# Patient Record
Sex: MALE | Race: ASIAN | NOT HISPANIC OR LATINO | Employment: UNEMPLOYED | URBAN - METROPOLITAN AREA
[De-identification: names, ages, dates, MRNs, and addresses within clinical notes are randomized per-mention and may not be internally consistent; named-entity substitution may affect disease eponyms.]

---

## 2017-05-24 ENCOUNTER — OFFICE VISIT (OUTPATIENT)
Dept: FAMILY MEDICINE | Facility: CLINIC | Age: 22
End: 2017-05-24

## 2017-05-24 VITALS
TEMPERATURE: 97.9 F | WEIGHT: 121.8 LBS | SYSTOLIC BLOOD PRESSURE: 131 MMHG | DIASTOLIC BLOOD PRESSURE: 88 MMHG | HEART RATE: 87 BPM | BODY MASS INDEX: 21.58 KG/M2

## 2017-05-24 DIAGNOSIS — F51.01 PRIMARY INSOMNIA: ICD-10-CM

## 2017-05-24 DIAGNOSIS — R63.4 LOSS OF WEIGHT: ICD-10-CM

## 2017-05-24 DIAGNOSIS — F32.3 MAJOR DEPRESSIVE DISORDER, SINGLE EPISODE, SEVERE WITH PSYCHOTIC FEATURES (H): ICD-10-CM

## 2017-05-24 DIAGNOSIS — J45.41 MODERATE PERSISTENT ASTHMA WITH ACUTE EXACERBATION: ICD-10-CM

## 2017-05-24 DIAGNOSIS — Z00.00 ROUTINE GENERAL MEDICAL EXAMINATION AT A HEALTH CARE FACILITY: Primary | ICD-10-CM

## 2017-05-24 LAB
BASOPHILS # BLD AUTO: 0.1 THOU/UL (ref 0–0.2)
BASOPHILS NFR BLD AUTO: 1 % (ref 0–2)
BUN SERPL-MCNC: 10.7 MG/DL (ref 7–21)
CALCIUM SERPL-MCNC: 10 MG/DL (ref 8.5–10.1)
CHLORIDE SERPLBLD-SCNC: 99.3 MMOL/L (ref 98–110)
CO2 SERPL-SCNC: 25.4 MMOL/L (ref 20–32)
CREAT SERPL-MCNC: 0.9 MG/DL (ref 0.7–1.3)
EOSINOPHIL # BLD AUTO: 0.5 THOU/UL (ref 0–0.4)
EOSINOPHIL NFR BLD AUTO: 7 % (ref 0–6)
ERYTHROCYTE [DISTWIDTH] IN BLOOD BY AUTOMATED COUNT: 19.9 % (ref 11–14.5)
GFR SERPL CREATININE-BSD FRML MDRD: >90 ML/MIN/1.7 M2
GLUCOSE SERPL-MCNC: 97.1 MG'DL (ref 70–99)
HCT VFR BLD AUTO: 47.4 % (ref 40–54)
HGB BLD-MCNC: 14.4 G/DL (ref 14–18)
LYMPHOCYTES # BLD AUTO: 2.6 THOU/UL (ref 0.8–4.4)
LYMPHOCYTES NFR BLD AUTO: 31 % (ref 20–40)
MCH RBC QN AUTO: 19.1 PG (ref 27–34)
MCHC RBC AUTO-ENTMCNC: 30.4 G/DL (ref 32–36)
MCV RBC AUTO: 63 FL (ref 80–100)
MONOCYTES # BLD AUTO: 0.6 THOU/UL (ref 0–0.9)
MONOCYTES NFR BLD AUTO: 7 % (ref 2–10)
NEUTROPHILS # BLD AUTO: 4.6 THOU/UL (ref 2–7.7)
NEUTROPHILS NFR BLD AUTO: 55 % (ref 50–70)
PLATELET # BLD AUTO: 287 THOU/UL (ref 140–440)
PMV BLD AUTO: 11.3 FL (ref 8.5–12.5)
POTASSIUM SERPL-SCNC: 3.4 MMOL/DL (ref 3.2–4.6)
RBC # BLD AUTO: 7.55 MILL/UL (ref 4.4–6.2)
SODIUM SERPL-SCNC: 138.2 MMOL/L (ref 132–142)
TSH SERPL DL<=0.05 MIU/L-ACNC: 1.53 UIU/ML (ref 0.3–5)
WBC # BLD AUTO: 8.4 THOU/UL (ref 4–11)

## 2017-05-24 RX ORDER — HYDROXYZINE HYDROCHLORIDE 25 MG/1
25-50 TABLET, FILM COATED ORAL EVERY 6 HOURS PRN
Qty: 60 TABLET | Refills: 1 | Status: SHIPPED | OUTPATIENT
Start: 2017-05-24 | End: 2018-11-14

## 2017-05-24 RX ORDER — FLUTICASONE PROPIONATE 110 UG/1
2 AEROSOL, METERED RESPIRATORY (INHALATION) 2 TIMES DAILY
Qty: 1 INHALER | Refills: 1 | Status: SHIPPED | OUTPATIENT
Start: 2017-05-24 | End: 2017-05-24

## 2017-05-24 RX ORDER — FLUTICASONE PROPIONATE 110 UG/1
2 AEROSOL, METERED RESPIRATORY (INHALATION) 2 TIMES DAILY
Qty: 1 INHALER | Refills: 1 | Status: SHIPPED | OUTPATIENT
Start: 2017-05-24 | End: 2018-11-14

## 2017-05-24 ASSESSMENT — ANXIETY QUESTIONNAIRES
3. WORRYING TOO MUCH ABOUT DIFFERENT THINGS: SEVERAL DAYS
6. BECOMING EASILY ANNOYED OR IRRITABLE: NOT AT ALL
5. BEING SO RESTLESS THAT IT IS HARD TO SIT STILL: NOT AT ALL
2. NOT BEING ABLE TO STOP OR CONTROL WORRYING: MORE THAN HALF THE DAYS
1. FEELING NERVOUS, ANXIOUS, OR ON EDGE: SEVERAL DAYS
7. FEELING AFRAID AS IF SOMETHING AWFUL MIGHT HAPPEN: SEVERAL DAYS
GAD7 TOTAL SCORE: 7
IF YOU CHECKED OFF ANY PROBLEMS ON THIS QUESTIONNAIRE, HOW DIFFICULT HAVE THESE PROBLEMS MADE IT FOR YOU TO DO YOUR WORK, TAKE CARE OF THINGS AT HOME, OR GET ALONG WITH OTHER PEOPLE: VERY DIFFICULT

## 2017-05-24 ASSESSMENT — PATIENT HEALTH QUESTIONNAIRE - PHQ9: 5. POOR APPETITE OR OVEREATING: MORE THAN HALF THE DAYS

## 2017-05-24 NOTE — MR AVS SNAPSHOT
After Visit Summary   5/24/2017    Mira Segundo    MRN: [id number]           Patient Information     Date Of Birth          1995        Visit Information        Provider Department      5/24/2017 11:00 AM Kerri Ventura MD [facility]        Today's Diagnoses     Routine general medical examination at a health care facility    -  1    Moderate persistent asthma with acute exacerbation        Primary insomnia        Loss of weight        Major depressive disorder, single episode, severe with psychotic features (H)          Care Instructions      Preventive Health Recommendations  Male Ages 18 - 25     Yearly exam:             See your health care provider every year in order to  o   Review health changes.   o   Discuss preventive care.    o   Review your medicines if your doctor has prescribed any.    You should be tested each year for STDs (sexually transmitted diseases).     Talk to your provider about cholesterol testing.      If you are at risk for diabetes, you should have a diabetes test (fasting glucose).    Shots: Get a flu shot each year. Get a tetanus shot every 10 years.     Nutrition:    Eat at least 5 servings of fruits and vegetables daily.     Eat whole-grain bread, whole-wheat pasta and brown rice instead of white grains and rice.     Talk to your provider about calcium and Vitamin D.     Lifestyle    Exercise for at least 150 minutes a week (30 minutes a day, 5 days a week). This will help you control your weight and prevent disease.     Limit alcohol to one drink per day.     No smoking.     Wear sunscreen to prevent skin cancer.     See your dentist every six months for an exam and cleaning.             Follow-ups after your visit        Future tests that were ordered for you today     Open Future Orders        Priority Expected Expires Ordered    H. Pylori Agn Fecal ([facility]) Routine  6/1/2017 5/25/2017    Ova And Parasite - Stool ([facility]) Routine  6/1/2017 5/25/2017             Who to contact     Please call your clinic at [phone] to:    Ask questions about your health    Make or cancel appointments    Discuss your medicines    Learn about your test results    Speak to your doctor   If you have compliments or concerns about an experience at your clinic, or if you wish to file a complaint, please contact [facility] Patient Relations at [phone] or email us at [email or web address]         Additional Information About Your Visit        [employer] Information     Oppex is an electronic gateway that provides easy, online access to your medical records. With Oppex, you can request a clinic appointment, read your test results, renew a prescription or communicate with your care team.     To sign up for Oppex visit the website at [email or web address]   You will be asked to enter the access code listed below, as well as some personal information. Please follow the directions to create your username and password.     Your access code is: [email or web address]  Expires: 2017  3:09 PM     Your access code will  in 90 days. If you need help or a new code, please contact your [facility] or call [phone] for assistance.        Care EveryWhere ID     This is your Care EveryWhere ID. This could be used by other organizations to access your [facility] medical records  [id number]        Your Vitals Were     Pulse Temperature BMI (Body Mass Index)             87 97.9  F (36.6  C) (Oral) 21.58 kg/m2          Blood Pressure from Last 3 Encounters:   17 120/84   17 131/88   16 (!) 133/92    Weight from Last 3 Encounters:   17 118 lb 9.6 oz (53.8 kg)   17 121 lb 12.8 oz (55.2 kg)   16 145 lb 9.6 oz (66 kg)              We Performed the Following     Basic Metabolic Panel ([facility])  - Results < 1 hr     CBC w/ Diff and Plt ([facility])     TSH  Sensitive ([facility])           Today's Medication Changes          These changes are accurate as of: 5/24/17 11:59 PM.  If you have any questions, ask your nurse or doctor.               Start taking these medicines.        Dose/Directions    beclomethasone 40 MCG/ACT Inhaler   Commonly known as:  QVAR   Used for:  Moderate persistent asthma with acute exacerbation   Started by:  Kerri Ventura MD        Dose:  2 puff   Inhale 2 puffs into the lungs 2 times daily   Quantity:  1 Inhaler   Refills:  1       fluticasone 110 MCG/ACT Inhaler   Commonly known as:  FLOVENT HFA   Used for:  Moderate persistent asthma with acute exacerbation   Started by:  Kerri Ventura MD        Dose:  2 puff   Inhale 2 puffs into the lungs 2 times daily   Quantity:  1 Inhaler   Refills:  1       hydrOXYzine 25 MG tablet   Commonly known as:  ATARAX   Used for:  Primary insomnia   Started by:  Kerri Ventura MD        Dose:  25-50 mg   Take 1-2 tablets (25-50 mg) by mouth every 6 hours as needed for itching   Quantity:  60 tablet   Refills:  1            Where to get your medicines      [facility] [place] [place] - [address], [place] [address]     Phone:  [phone]     beclomethasone 40 MCG/ACT Inhaler    fluticasone 110 MCG/ACT Inhaler    hydrOXYzine 25 MG tablet                Primary Care Provider Office Phone # Fax #    Kerri Ventura [phone]       [address] [address]        Thank you!     Thank you for choosing [facility]  for your care. Our goal is always to provide you with excellent care. Hearing back from our patients is one way we can continue to improve our services. Please take a few minutes to complete the written survey that you may receive in the mail after your visit with us. Thank you!             Your Updated Medication List - Protect others around you: Learn how to safely use, store and throw away your  medicines at www.disposemymeds.org.          This list is accurate as of: 5/24/17 11:59 PM.  Always use your most recent med list.                   Brand Name Dispense Instructions for use    albuterol 108 (90 BASE) MCG/ACT Inhaler    PROAIR HFA/PROVENTIL HFA/VENTOLIN HFA    1 Inhaler    Inhale 2 puffs into the lungs every 6 hours as needed for shortness of breath / dyspnea       beclomethasone 40 MCG/ACT Inhaler    QVAR    1 Inhaler    Inhale 2 puffs into the lungs 2 times daily       fluticasone 110 MCG/ACT Inhaler    FLOVENT HFA    1 Inhaler    Inhale 2 puffs into the lungs 2 times daily       hydrOXYzine 25 MG tablet    ATARAX    60 tablet    Take 1-2 tablets (25-50 mg) by mouth every 6 hours as needed for itching       ibuprofen 600 MG tablet    IBU    60 tablet    Take 1 tablet (600 mg) by mouth every 6 hours as needed for pain       nicotine 10 MG Inhaler    NICOTROL    180 Box    Puff on as needed for smoking cravings

## 2017-05-24 NOTE — PROGRESS NOTES
Preceptor attestation:  Patient seen and discussed with the resident. Assessment and plan reviewed with resident and agreed upon.  Supervising physician: Radha Faria  [facility]

## 2017-05-24 NOTE — PROGRESS NOTES
Male Physical Note    A Norma  was present for exam today. Patient speaks English and did not use  for most of the visit.     Concerns today:   - Asthma: Reports that his asthma is not well controlled. Uses albuterol as needed. Reports that the last time he had an asthma attack was 2 months ago. He did not have to go to the hospital or clinic. He used albuterol 4-5 times that day and he felt better. Other than that, he uses albuterol before playing soccer and the last time he used it was yesterday.     - Weight Loss: Reports that since the last time he was in clinic, he lost at least 20 lbs. He has not does this intentionally. He reports that he has not been eating or sleeping well since he stopped some of his medications. He is unsure if he is hungry. He doesn't notice changes in stool patterns. Occasional vomiting, but not in the past 2 months.     - Insomnia: Sleeps for 2-3 hours every 2-3 days. Previously felt that Olanzapine helped him sleep but he stopped taking it due to side effects and wants to have medication for sleep. He reports that he does feel tired, but is just not able to fall or stay asleep.    - Depression: Patient has a history of major depression with severe psychotic features, anxiety, and PTSD. He was seen at [facility] in the past. He does not recall when the last time he was there was. He remembers that he was late for his appointment and so it was cancelled and then he did not go back to reschedule the appointment. He cannot remember if this was weeks, months, or years prior to today. Denies SI/HI and is able to contract for safety. Feels safe at home.         ROS:                      CONSTITUTIONAL: weight loss, fatigue, difficulty sleeping  SKIN: no worrisome rashes, no worrisome moles, no worrisome lesions  EYES: no acute vision problems or changes  ENT: no ear problems, no mouth problems, no throat problems  RESP: no significant cough, no shortness of breath  CV: no  chest pain, no palpitations, no new or worsening peripheral edema  GI: no nausea, no vomiting, no constipation, no diarrhea  : no frequency, no dysuria, no hematuria  MS: no claudication, no myalgias, no joint aches  NEURO: no weakness, no dizziness, no syncope, no headaches  ENDOCRINE: no temperature intolerance, no skin/hair changes  PSYCHIATRIC: Increasing anxiety, depressed mood, appetite changes and weight change    Past Medical History:   Diagnosis Date     Moderate intermittent asthma      Severe recurrent major depression w/psychotic features, mood-congruent (H)         No family history on file.  Reviewed no other significant FH           Family History and past Medical History reviewed and unchanged/updated.    Social History   Substance Use Topics     Smoking status: Current Every Day Smoker     Last attempt to quit: 4/30/2012     Smokeless tobacco: Never Used      Comment: stopped smoking in 4/30/2013 per pt info.     Alcohol use Not on file   - Smokes 3 cigarettes per day. Has tried to quit but has not been able to stop. Uncertain if he wants to quit at this time.  - Does not drink daily. Previously reports he drank daily and would drink multiple beers per day but then felt that it made him more thirsty and so he stopped drinking. He denies problems with work or family due to prior drinking  - Lives at home with his parents and 4 siblings, he is the 2nd oldest.   - Works daily as a PCA    Single  Children ? no    Has anyone hurt you physically, for example by pushing, hitting, slapping or kicking you or forcing you to have sex? Denies  Do you feel threatened or controlled by a partner, ex-partner or anyone in your life? Denies    RISK BEHAVIORS AND HEALTHY HABITS:  Tobacco Use/Smoking: 3 cigarettes per day  Illicit Drug Use: None  ETOH: None  Do you use alcohol? No  Sexually Active: No  Diet (5-7 servings of fruits/veg daily): No   Exercise (30 min accumulated most days):No  Dental Care: No   Calcium  1500 mg/d:  No  Seat Belt Use: Yes       Immunization History   Administered Date(s) Administered     Hepatitis A Vac Ped/Adol-2 Dose 03/27/2009, 11/11/2009     Hepatitis B 11/25/2008, 03/27/2009, 06/23/2009     Influenza (IIV3) 09/09/2013     MMR 11/25/2008, 03/27/2009     Meningococcal (Menactra ) 09/09/2013     Meningococcal (Menomune ) 03/27/2009     Poliovirus, inactivated (IPV) 03/27/2009, 05/01/2009, 06/23/2009     TD (ADULT, 7+) 11/25/2008, 06/23/2009     Tdap (Adacel,Boostrix) 03/27/2009     Varicella 03/27/2009, 09/09/2013     EXAMINATION:  /88 (BP Location: Left arm, Patient Position: Chair, Cuff Size: Adult Regular)  Pulse 87  Temp 97.9  F (36.6  C) (Oral)  Wt 121 lb 12.8 oz (55.2 kg)  BMI 21.58 kg/m2  GENERAL: healthy, alert and no distress  EYES: Eyes grossly normal to inspection, extraocular movements - intact, and PERRL  HENT: ear canals- normal; TMs- normal; Nose- normal; Mouth- no ulcers, no lesions  NECK: no tenderness, no adenopathy, no asymmetry, no masses, no stiffness; thyroid- normal to palpation  RESP: lungs clear to auscultation - no rales, no rhonchi, no wheezes  CV: regular rates and rhythm, normal S1 S2, no S3 or S4 and no murmur, no click or rub -  ABDOMEN: soft, no tenderness, no  hepatosplenomegaly, no masses, normal bowel sounds  MS: extremities- no gross deformities noted, no edema  SKIN: no suspicious lesions, no rashes  NEURO: strength and tone- normal, sensory exam- grossly normal, mentation- intact, speech- normal, reflexes- symmetric  BACK: no CVA tenderness, no paralumbar tenderness  - Patient Declined  RECTAL- Patient Declined  PSYCH: Alert and oriented times 3; speech- coherent , normal rate and volume, no tangential thoughts, no hallucinations or delusions, affect- flat, Difficult to assess thought process as patient occasionally uses  but at times does not appear to understand question but does not have  explain question or answer. Answers  some questions with inappropriate response but unclear if this is from thought process or from miscommunication.   LYMPHATICS: ant. cervical- normal, post. cervical- normal, axillary- normal, supraclavicular- normal, inguinal- normal    ASSESSMENT and PLAN:  1. Moderate persistent asthma with acute exacerbation: Patient reports poor control, but does appear to have only had one exacerbation in the past 6 months which did not require steroids and was not evaluated by a health care provider. Attempted to refill flovent and was informed by pharmacy that was not covered by current insurance and so QVAR was prescribed. Would like patient to come back for PFT testing as this has not previously been done and may help to assess severity of disease.  - fluticasone (FLOVENT HFA) 110 MCG/ACT Inhaler; Inhale 2 puffs into the lungs 2 times daily  Dispense: 1 Inhaler; Refill: 1  - beclomethasone (QVAR) 40 MCG/ACT Inhaler; Inhale 2 puffs into the lungs 2 times daily  Dispense: 1 Inhaler; Refill: 1    2. Routine general medical examination at a health care facility  - Up to date with vaccinations and screenings    3. Primary insomnia: Previously on Olanzapine and Prozac. Not currently taking. Will give hydroxyzine for now, but want patient to come back within the next week to discuss depression and insomnia with weight loss.  - hydrOXYzine (ATARAX) 25 MG tablet; Take 1-2 tablets (25-50 mg) by mouth every 6 hours as needed for itching  Dispense: 60 tablet; Refill: 1    4. Loss of weight: Nearly 30 lbs lost since last visit earlier this year and reports not eating as much as previously and sleeping very poorly. Will check basic labs and have patient return to discuss results.   - TSH  Sensitive ([facility])  - Basic Metabolic Panel ([facility])  - Results < 1 hr  - CBC w/ Diff and Plt ([facility])    5. Major depressive disorder, single episode, severe with psychotic features (H): PHQ-9 was 12 today. Patient denies SI/HI and feels that  his depression is worse than when he was on medications. Not sure when he was last seen for therapy or evaluation by psychiatrist. Will have patient return to discuss and plan for treatment at follow-up visit.    Kerri Ventura MD - PGY-3  [facility] Res[facility]    Patient seen and plan of care discussed with preceptor, Dr. Faria

## 2017-05-24 NOTE — NURSING NOTE
name: Pamela Shipley  Language: Norma  Agency: [facility]  Phone number: [phone]

## 2017-05-25 ENCOUNTER — OFFICE VISIT (OUTPATIENT)
Dept: FAMILY MEDICINE | Facility: CLINIC | Age: 22
End: 2017-05-25

## 2017-05-25 VITALS
BODY MASS INDEX: 21.02 KG/M2 | WEIGHT: 118.6 LBS | HEIGHT: 63 IN | HEART RATE: 67 BPM | OXYGEN SATURATION: 100 % | SYSTOLIC BLOOD PRESSURE: 120 MMHG | DIASTOLIC BLOOD PRESSURE: 84 MMHG | TEMPERATURE: 98.1 F

## 2017-05-25 DIAGNOSIS — F43.23 ADJUSTMENT DISORDER WITH MIXED ANXIETY AND DEPRESSED MOOD: ICD-10-CM

## 2017-05-25 DIAGNOSIS — R10.13 ABDOMINAL PAIN, EPIGASTRIC: Primary | ICD-10-CM

## 2017-05-25 DIAGNOSIS — R63.4 LOSS OF WEIGHT: ICD-10-CM

## 2017-05-25 RX ORDER — MIRTAZAPINE 15 MG/1
15 TABLET, FILM COATED ORAL AT BEDTIME
Qty: 90 TABLET | Refills: 3 | Status: SHIPPED | OUTPATIENT
Start: 2017-05-25 | End: 2018-11-14

## 2017-05-25 ASSESSMENT — PATIENT HEALTH QUESTIONNAIRE - PHQ9: SUM OF ALL RESPONSES TO PHQ QUESTIONS 1-9: 12

## 2017-05-25 ASSESSMENT — ANXIETY QUESTIONNAIRES: GAD7 TOTAL SCORE: 7

## 2017-05-25 NOTE — PROGRESS NOTES
Preceptor attestation:  Patient seen and discussed with the resident. Assessment and plan reviewed with resident and agreed upon.  Supervising physician: Lamont Kearney  [facility]

## 2017-05-25 NOTE — LETTER
June 6, 2017      Mira Segundo  [address]  [place] [address]        Dear Mira Segundo,     Your healthcare is very important to us. In reviewing your chart, we found that you have not had your referral appointment with Lab. Please call our scheduling department to schedule this appointment. If you have any questions, please call [facility] at [phone]. Thank you.     Sincerely,      Jose Rafael Christianson     [facility]  Hours:  Monday through Friday, 8:00 am - 5:00 pm  Phone Number: [phone]

## 2017-05-25 NOTE — NURSING NOTE
name: Pamela Shipley  Language: Norma  Agency: [facility]  Phone number: [phone]

## 2017-05-25 NOTE — MR AVS SNAPSHOT
After Visit Summary   5/25/2017    Mira Segundo    MRN: [id number]           Patient Information     Date Of Birth          1995        Visit Information        Provider Department      5/25/2017 1:50 PM Kerri Ventura MD [facility]        Today's Diagnoses     Abdominal pain, epigastric    -  1    Loss of weight        Adjustment disorder with mixed anxiety and depressed mood          Care Instructions    Start taking Mirtazepine 15 mg every night    Make an appointment to follow-up in 2 weeks for either Tuesday morning, Thursday afternoon or Friday afternoon with Dr. Ventura to help with mental health referral            Follow-ups after your visit        Future tests that were ordered for you today     Open Future Orders        Priority Expected Expires Ordered    H. Pylori Agn Fecal ([facility]) Routine  6/1/2017 5/25/2017    Ova And Parasite - Stool ([facility]) Routine  6/1/2017 5/25/2017            Who to contact     Please call your clinic at [phone] to:    Ask questions about your health    Make or cancel appointments    Discuss your medicines    Learn about your test results    Speak to your doctor   If you have compliments or concerns about an experience at your clinic, or if you wish to file a complaint, please contact [facility] Patient Relations at [phone] or email us at [email or web address]         Additional Information About Your Visit        [employer] Information     Exabeam is an electronic gateway that provides easy, online access to your medical records. With Exabeam, you can request a clinic appointment, read your test results, renew a prescription or communicate with your care team.     To sign up for EcoStartt visit the website at [email or web address]   You will be asked to enter the access code listed below, as well as some personal information. Please follow the directions to create your username and password.     Your  "access code is: [email or web address]  Expires: 2017  3:09 PM     Your access code will  in 90 days. If you need help or a new code, please contact your [facility] or call [phone] for assistance.        Care EveryWhere ID     This is your Care EveryWhere ID. This could be used by other organizations to access your [facility] medical records  [id number]        Your Vitals Were     Pulse Temperature Height Pulse Oximetry BMI (Body Mass Index)       67 98.1  F (36.7  C) (Oral) 5' 3\" (160 cm) 100% 21.01 kg/m2        Blood Pressure from Last 3 Encounters:   17 120/84   17 131/88   16 (!) 133/92    Weight from Last 3 Encounters:   17 118 lb 9.6 oz (53.8 kg)   17 121 lb 12.8 oz (55.2 kg)   16 145 lb 9.6 oz (66 kg)                 Today's Medication Changes          These changes are accurate as of: 17  3:09 PM.  If you have any questions, ask your nurse or doctor.               Start taking these medicines.        Dose/Directions    mirtazapine 15 MG tablet   Commonly known as:  REMERON   Used for:  Adjustment disorder with mixed anxiety and depressed mood   Started by:  Kerri Ventura MD        Dose:  15 mg   Take 1 tablet (15 mg) by mouth At Bedtime   Quantity:  90 tablet   Refills:  3            Where to get your medicines      These medications were sent to [facility] - [place], [place] - [address], [place] [address]     Phone:  [phone]     mirtazapine 15 MG tablet                Primary Care Provider Office Phone # Fax #    Kerri Ventura [phone]       [address] [address]        Thank you!     Thank you for choosing [facility]  for your care. Our goal is always to provide you with excellent care. Hearing back from our patients is one way we can continue to improve our services. Please take a few minutes to complete the written survey " that you may receive in the mail after your visit with us. Thank you!             Your Updated Medication List - Protect others around you: Learn how to safely use, store and throw away your medicines at www.disposemymeds.org.          This list is accurate as of: 5/25/17  3:09 PM.  Always use your most recent med list.                   Brand Name Dispense Instructions for use    albuterol 108 (90 BASE) MCG/ACT Inhaler    PROAIR HFA/PROVENTIL HFA/VENTOLIN HFA    1 Inhaler    Inhale 2 puffs into the lungs every 6 hours as needed for shortness of breath / dyspnea       beclomethasone 40 MCG/ACT Inhaler    QVAR    1 Inhaler    Inhale 2 puffs into the lungs 2 times daily       fluticasone 110 MCG/ACT Inhaler    FLOVENT HFA    1 Inhaler    Inhale 2 puffs into the lungs 2 times daily       hydrOXYzine 25 MG tablet    ATARAX    60 tablet    Take 1-2 tablets (25-50 mg) by mouth every 6 hours as needed for itching       ibuprofen 600 MG tablet    IBU    60 tablet    Take 1 tablet (600 mg) by mouth every 6 hours as needed for pain       mirtazapine 15 MG tablet    REMERON    90 tablet    Take 1 tablet (15 mg) by mouth At Bedtime       nicotine 10 MG Inhaler    NICOTROL    180 Box    Puff on as needed for smoking cravings

## 2017-05-25 NOTE — PROGRESS NOTES
SUBJECTIVE       Mira Segundo is a 22 year old  male with a PMH significant for:     Patient Active Problem List   Diagnosis     Adjustment disorder with mixed anxiety and depressed mood     Chronic motor or vocal tic disorder     Dyshidrosis     Mild intermittent asthma     Beta-Thalassemia Trait     Major depressive disorder, single episode, severe with psychotic features (H)     Patient is here for follow-up of weight loss and insomnia. He reports he usually only eats 1 time per day. He is eating rice and vegetables 1 time per day but mostly just 1 time per day. Reports that he eats about 1 cup of of rice/vegetables at most. His stomach hurts when he does not eat. Eating makes the pain better. He does not eat because he is worried he might vomit. Last time he vomiting was 2 months ago.  Feels that he is nauseated sometimes feels nauseated which is maybe 1-2 times per week. Has a bowel movement every other day or every 3 days. No diarrhea. Not painful and no blood in his stool. No changes in urine color and no hematuria that he can appreciate. Has epigastric pain and sometimes all over abdominal pain. Denies bad taste in his mouth. Denies burning sensation in his chest. Feels that food does not have a good taste.     Has only been sleeping 2 hours every 2 days. Reports that problems started in December and January. He stopped taking his medications in December. He stopped taking them because he felt he was having dizziness, headache, and low back pain because of them. Since January he has initially slept every 3 days. Now he only sleeps every 2 days which is better. When his is unable to sleep, he watches movies and listens to music. Feels tired. Does not feel that he is being more productive. Denies auditory or visual hallucinations. Has never been on any other medications for depression. Denies SI/HI. Feels more anxious and worried. Wants to go to college but feels that he has a bad memory. He is very concerned  "about taking any of the medications he was previously on because he is concerned about the side effects.     Reviewed labs from yesterday with patient which were unremarkable except for mild absolute eosinophilia.     Patient attempted to complete PFT's today for evaluation of his asthma but was not able to follow instructions for breathing technique after multiple attempts.     PMH, Medications and Allergies were reviewed and updated as needed.        OBJECTIVE     Vitals:    05/25/17 1409   BP: 120/84   Pulse: 67   Temp: 98.1  F (36.7  C)   TempSrc: Oral   SpO2: 100%   Weight: 118 lb 9.6 oz (53.8 kg)   Height: 5' 3\" (160 cm)     Body mass index is 21.01 kg/(m^2).    G: Calm appearing male in no apparent distress  HEENT: normocephalic, atraumatic, PERRL, sclera anicteric and not injected. TM flat without erythema. No cervical adenopathy  Cardio: RRR, no murmurs, rubs or gallop  Respiratory: Clear breath sounds bilaterally, no wheezes or rhonchi  Abdomen: Soft, non-tender, no masses or organomegaly  Extremities: No edema, no rashes  Psych: Denies SI/HI    Results for orders placed or performed in visit on 05/24/17   TSH  Sensitive ([employer])   Result Value Ref Range    TSH 1.53 0.30 - 5.00 uIU/mL    Narrative    Test performed by:  [facility] [address] [place] [address]   Basic Metabolic Panel ([facility])  - Results < 1 hr   Result Value Ref Range    Urea Nitrogen 10.7 7.0 - 21.0 mg/dL    Calcium 10.0 8.5 - 10.1 mg/dL    Chloride 99.3 98.0 - 110.0 mmol/L    Carbon Dioxide 25.4 20.0 - 32.0 mmol/L    Creatinine 0.9 0.7 - 1.3 mg/dL    Glucose 97.1 70.0 - 99.0 mg'dL    Potassium 3.4 3.2 - 4.6 mmol/dL    Sodium 138.2 132.0 - 142.0 mmol/L    GFR Estimate >90 >60.0 mL/min/1.7 m2    GFR Estimate If Black >90 >60.0 mL/min/1.7 m2   CBC w/ Diff and Plt ([employer])   Result Value Ref Range    WBC 8.4 4.0 - 11.0 thou/uL    RBC 7.55 (H) 4.40 - 6.20 mill/uL    Hemoglobin 14.4 14.0 - 18.0 g/dL    Hematocrit " 47.4 40.0 - 54.0 %    MCV 63 (L) 80 - 100 fL    MCH 19.1 (L) 27.0 - 34.0 pg    MCHC 30.4 (L) 32.0 - 36.0 g/dL    RDW 19.9 (H) 11.0 - 14.5 %    Platelets 287 140 - 440 thou/uL    Mean Platelet Volume 11.3 8.5 - 12.5 fL    % Neutrophils 55 50 - 70 %    % Lymphocytes 31 20 - 40 %    % Monocytes 7 2 - 10 %    % Eosinophils 7 (H) 0 - 6 %    % Basophils 1 0 - 2 %    Neutrophils (Absolute) 4.6 2.0 - 7.7 thou/uL    Lymphs (Absolute) 2.6 0.8 - 4.4 thou/uL    Monocytes(Absolute) 0.6 0.0 - 0.9 thou/uL    Eos (Absolute) 0.5 (H) 0.0 - 0.4 thou/uL    Baso (Absolute) 0.1 0.0 - 0.2 thou/uL    Narrative    Test performed by:  [facility] [address] [place] [address]       ASSESSMENT AND PLAN     1. Abdominal pain, epigastric: Patient uncertain about his abdominal pain. Does not endorse specific symptoms of esophageal reflux. Will test for H. Pylori given his intermittent abdominal pain and weight loss. Will not start PPI or H2 blocker unless positive or patient becomes symptomatic.   - H. Pylori Agn Fecal ([employer]); Future    2. Loss of weight: Intermittent abdominal pain. No changes in stool. No recent travel. Will get stool O&P due to weight loss and mild eosinophilia.   - Ova And Parasite - Stool ([employer]); Future    3. Adjustment disorder with mixed anxiety and depressed mood: Patient has a longstanding history of severe depression with psychosis and PTSD. Sleep disturbance and decreased appetite with weight loss have started since patient stopped all of his medications due to side effects. He declines to restart Zyprexa or Prozac due to side effects. Patient signed SHANTAL today and we were assisted by Dr. Lombardi to complete them from previously psychotherapy and psychiatry. Recommend that patient be referred for therapy again and he is agreeable to starting this. Will start Mirtazepine for depression with decreased appetite and weight loss. Would like patient to follow-up in 2 weeks to recheck weight  and symptoms. Requested to schedule on day when in house mental health provider is available to meet patient and help with referral.  - mirtazapine (REMERON) 15 MG tablet; Take 1 tablet (15 mg) by mouth At Bedtime  Dispense: 90 tablet; Refill: 3  - Mental Health Adult Referral-Kale Ventura MD - PGY-3  [facility]    Patient seen and plan of care discussed with preceptor, Dr. Kearney

## 2017-05-25 NOTE — PATIENT INSTRUCTIONS
Start taking Mirtazepine 15 mg every night    Make an appointment to follow-up in 2 weeks for either Tuesday morning, Thursday afternoon or Friday afternoon with Dr. Ventura to help with mental health referral      05/30/17  A message has been sent to the  Team to advise for treatment options.  Sofi LEE have mailed a letter home to the patient with these resources to schedule.  Sofi  05/31/17    Referral Tracking:  Referral complete. Patient seeking services at [facility].  Kat Correa  8/2/17

## 2017-05-30 ENCOUNTER — DOCUMENTATION ONLY (OUTPATIENT)
Dept: PSYCHOLOGY | Facility: CLINIC | Age: 22
End: 2017-05-30

## 2017-05-30 NOTE — PROGRESS NOTES
Neil Farah,    Review of the patient's medical record and mental health referral order indicates this referral is for both psychiatry (long-term medication management) and psychotherapy services. Please reach out to the patient in order to provide him with the following community resources.    Nathaniel Lombardi, Ph.D.  Behavioral Health Fellow        [facility]  [facility] [address] [facility] [address]  [place] [address]  [phone]    [facility]  [address] [place] [address] [address]  [place], [place] [address]  [phone]    [facility] [address]   [facility] [address]  [phone]    [facility]  [address] [place] [address] [address]   [place], [place] [address]  [phone]

## 2017-05-30 NOTE — LETTER
May 31, 2017      Mira Segundo  [address]  [place] [address]        Dear Mira,    We recently had seen you in clinic and a referral was placed for you to see a mental health provider. After review of the referral we feel one of the following locations would be best for your treatment.     [facility]  [address]  [address]  [place] [address]  [phone]     [facility]  [address]  [address]  [place], [place] [address]  [phone]     [facility] [place] [facility]  [facility]  [address]  [address]   [place] [address]  [phone]     [facility]  [address]   [place], [place] [address]  [phone]    Contact one of these clinics and go through their intake process to schedule an appointment. Feel free to call us with questions. Thank you.       Sincerely,  Sofi  Referral Coordinator

## 2017-05-30 NOTE — PROGRESS NOTES
Behavioral Health Team,    Patient is being referred for mental health services. Please advise if we are able to see patient for in house treatment or if a community option would be best.    Thank you.     Sofi  Referral Coordinator

## 2017-05-31 NOTE — PROGRESS NOTES
Thank you.  I have mailed a letter home to the patient with these resources to schedule.  Sofi  05/31/17

## 2017-06-21 ENCOUNTER — OFFICE VISIT (OUTPATIENT)
Dept: FAMILY MEDICINE | Facility: CLINIC | Age: 22
End: 2017-06-21

## 2017-06-21 VITALS
DIASTOLIC BLOOD PRESSURE: 84 MMHG | BODY MASS INDEX: 21.65 KG/M2 | SYSTOLIC BLOOD PRESSURE: 125 MMHG | HEART RATE: 60 BPM | HEIGHT: 63 IN | WEIGHT: 122.2 LBS | TEMPERATURE: 97.9 F

## 2017-06-21 DIAGNOSIS — M79.5 FOREIGN BODY (FB) IN SOFT TISSUE: ICD-10-CM

## 2017-06-21 DIAGNOSIS — R63.4 LOSS OF WEIGHT: Primary | ICD-10-CM

## 2017-06-21 DIAGNOSIS — F43.23 ADJUSTMENT DISORDER WITH MIXED ANXIETY AND DEPRESSED MOOD: ICD-10-CM

## 2017-06-21 RX ORDER — MUPIROCIN 20 MG/G
OINTMENT TOPICAL 3 TIMES DAILY
Qty: 22 G | Refills: 1 | Status: SHIPPED | OUTPATIENT
Start: 2017-06-21 | End: 2017-06-26

## 2017-06-21 NOTE — PROGRESS NOTES
"       SUBJECTIVE       Lo Sanya is a 22 year old  male with a PMH significant for:     Patient Active Problem List   Diagnosis     Adjustment disorder with mixed anxiety and depressed mood     Chronic motor or vocal tic disorder     Dyshidrosis     Mild intermittent asthma     Beta-Thalassemia Trait     Major depressive disorder, single episode, severe with psychotic features (H)     He presents with fishhook in Right earlobe.  Happened last evening.  Couldn't get out at home.  Single hook.      Tetnus up to date.    Seen recently and started remron.  Reports he is tolerating well, things are improved.  Weight is up 4 pounds.    PMH, Medications and Allergies were reviewed and updated as needed.        REVIEW OF SYSTEMS     General: No fevers, chills, sweats, unexplained weight loss  Head: No headache  Neck: No swallowing problems   CV: No chest pain or palpitations  Resp: No shortness of breath.  No cough. No hemoptysis.  GI: No constipation, diarrhea, or blood in stool.  no nausea or vomiting  : No pain passing urine or urinary frequency            OBJECTIVE     Vitals:    06/21/17 1018   BP: 125/84   Pulse: 60   Temp: 97.9  F (36.6  C)   TempSrc: Oral   Weight: 122 lb 3.2 oz (55.4 kg)   Height: 5' 2.5\" (158.8 cm)     Body mass index is 21.99 kg/(m^2).    Gen:  Well nourished and in NAD  Ear: hook imbeded in posterior soft tissue.  No erythema or signs of infection  HEENT: PERRLA; TMs normal color and landmarks; nasopharynx pink and moist; oropharynx pink and moist  Neck: supple without lymphadenopathy  CV:  RRR  - no murmurs, rubs, or gallups,   Pulm:  CTAB, no wheezes/rales/rhonchi, good air entry   ABD: soft, nontender, no masses, no rebound, BS intact throughout  Extrem: no cyanosis, edema or clubbing  Psych: Euthymic     No results found for this or any previous visit (from the past 24 hour(s)).        ASSESSMENT AND PLAN     Mira was seen today for ear problem.    Diagnoses and all orders for this " visit:    Brainards injury to finger, unspecified laterality, initial encounter  -     mupirocin (BACTROBAN) 2 % ointment; Apply topically 3 times daily for 5 days    After explaining procedure, with verbal consent, 1 ml 1% Lidocaine infiltrated in region of hook.  Beta dyne cleanse. Hook is grasped with hemostat and pushed through and removed.  Tolerated well.  Antibacterial ointment applied    Patient Instructions   Today, we removed a fish hook from your right ear. We sent a prescription for topical ointment to your pharmacy. Apply ointment to wound 2 times per day for one week. Please return to be seen if the wound begins having drainage or is very swollen, red and hot to touch.          Total of 30 minutes was spent in face to face contact with patient with > 50% in counseling and coordination of care.  Options for treatment and/or follow-up care were reviewed with the patient. Mira Segundo was engaged and actively involved in the decision making process. He verbalized understanding of the options discussed and was satisfied with the final plan.      Lamont Kearney MD

## 2017-06-21 NOTE — MR AVS SNAPSHOT
After Visit Summary   2017    Mira Segundo    MRN: [id number]           Patient Information     Date Of Birth          1995        Visit Information        Provider Department      2017 10:00 AM Lamont Kearney MD [facility]        Care Instructions    Today, we removed a fish hook from your right ear. We sent a prescription for topical ointment to your pharmacy. Apply ointment to wound 2 times per day for one week. Please return to be seen if the wound begins having drainage or is very swollen, red and hot to touch.              Follow-ups after your visit        Who to contact     Please call your clinic at [phone] to:    Ask questions about your health    Make or cancel appointments    Discuss your medicines    Learn about your test results    Speak to your doctor   If you have compliments or concerns about an experience at your clinic, or if you wish to file a complaint, please contact [facility] Patient Relations at [phone] or email us at [email or web address]         Additional Information About Your Visit        MyChart Information     [facility] is an electronic gateway that provides easy, online access to your medical records. With [facility], you can request a clinic appointment, read your test results, renew a prescription or communicate with your care team.     To sign up for [facility] visit the website at [email or web address]   You will be asked to enter the access code listed below, as well as some personal information. Please follow the directions to create your username and password.     Your access code is: [email or web address]  Expires: 2017  3:09 PM     Your access code will  in 90 days. If you need help or a new code, please contact your [facility] or call [phone] for assistance.        Care EveryWhere ID     This is your Care EveryWhere ID. This could be used by other organizations to  "access your [facility] medical records  [id number]        Your Vitals Were     Pulse Temperature Height BMI (Body Mass Index)          60 97.9  F (36.6  C) (Oral) 5' 2.5\" (158.8 cm) 21.99 kg/m2         Blood Pressure from Last 3 Encounters:   06/21/17 125/84   05/25/17 120/84   05/24/17 131/88    Weight from Last 3 Encounters:   06/21/17 122 lb 3.2 oz (55.4 kg)   05/25/17 118 lb 9.6 oz (53.8 kg)   05/24/17 121 lb 12.8 oz (55.2 kg)              Today, you had the following     No orders found for display       Primary Care Provider Office Phone # Fax #    Kerri Ventura [phone]       [address] [address]        Equal Access to Services     DAVID [facility] CenterJOSIANE : Hadii daya Kan, waaxda luqadaha, qaybta kaalmada adejanesyaashley, kenney montano . So [facility] [phone].    ATENCIÓN: Si habla español, tiene a poon disposición servicios gratuitos de asistencia lingüística. Llame al [phone].    We comply with applicable federal civil rights laws and [place] laws. We do not discriminate on the basis of race, color, national origin, age, disability sex, sexual orientation or gender identity.            Thank you!     Thank you for choosing [facility]  for your care. Our goal is always to provide you with excellent care. Hearing back from our patients is one way we can continue to improve our services. Please take a few minutes to complete the written survey that you may receive in the mail after your visit with us. Thank you!             Your Updated Medication List - Protect others around you: Learn how to safely use, store and throw away your medicines at www.disposemymeds.org.          This list is accurate as of: 6/21/17 10:37 AM.  Always use your most recent med list.                   Brand Name Dispense Instructions for use Diagnosis    albuterol 108 (90 BASE) MCG/ACT Inhaler    PROAIR HFA/PROVENTIL HFA/VENTOLIN HFA    1 " Inhaler    Inhale 2 puffs into the lungs every 6 hours as needed for shortness of breath / dyspnea    Intermittent asthma, uncomplicated       beclomethasone 40 MCG/ACT Inhaler    QVAR    1 Inhaler    Inhale 2 puffs into the lungs 2 times daily    Moderate persistent asthma with acute exacerbation       fluticasone 110 MCG/ACT Inhaler    FLOVENT HFA    1 Inhaler    Inhale 2 puffs into the lungs 2 times daily    Moderate persistent asthma with acute exacerbation       hydrOXYzine 25 MG tablet    ATARAX    60 tablet    Take 1-2 tablets (25-50 mg) by mouth every 6 hours as needed for itching    Primary insomnia       ibuprofen 600 MG tablet    IBU    60 tablet    Take 1 tablet (600 mg) by mouth every 6 hours as needed for pain    Jaw pain, Mild persistent asthma, Routine infant or child health check       mirtazapine 15 MG tablet    REMERON    90 tablet    Take 1 tablet (15 mg) by mouth At Bedtime    Adjustment disorder with mixed anxiety and depressed mood       nicotine 10 MG Inhaler    NICOTROL    180 Box    Puff on as needed for smoking cravings    Tobacco use disorder

## 2017-06-22 ASSESSMENT — PATIENT HEALTH QUESTIONNAIRE - PHQ9: SUM OF ALL RESPONSES TO PHQ QUESTIONS 1-9: 1

## 2017-10-06 DIAGNOSIS — J45.20 INTERMITTENT ASTHMA, UNCOMPLICATED: ICD-10-CM

## 2017-10-06 RX ORDER — ALBUTEROL SULFATE 90 UG/1
2 AEROSOL, METERED RESPIRATORY (INHALATION) EVERY 6 HOURS PRN
Qty: 2 INHALER | Refills: 0 | Status: SHIPPED | OUTPATIENT
Start: 2017-10-06 | End: 2018-05-04

## 2017-10-06 NOTE — NURSING NOTE
Refills given for ventolin x's 1 per RN protocol. Appointment needed for additional refills./THUAN Handy

## 2018-05-04 DIAGNOSIS — J45.20 INTERMITTENT ASTHMA, UNCOMPLICATED: ICD-10-CM

## 2018-05-04 RX ORDER — ALBUTEROL SULFATE 90 UG/1
2 AEROSOL, METERED RESPIRATORY (INHALATION) EVERY 6 HOURS PRN
Qty: 2 INHALER | Refills: 0 | Status: SHIPPED | OUTPATIENT
Start: 2018-05-04 | End: 2018-11-14

## 2018-09-27 ENCOUNTER — OFFICE VISIT (OUTPATIENT)
Dept: FAMILY MEDICINE | Facility: CLINIC | Age: 23
End: 2018-09-27
Payer: COMMERCIAL

## 2018-09-27 VITALS
HEIGHT: 63 IN | WEIGHT: 132.2 LBS | OXYGEN SATURATION: 98 % | TEMPERATURE: 97.8 F | SYSTOLIC BLOOD PRESSURE: 116 MMHG | RESPIRATION RATE: 16 BRPM | HEART RATE: 71 BPM | BODY MASS INDEX: 23.42 KG/M2 | DIASTOLIC BLOOD PRESSURE: 75 MMHG

## 2018-09-27 DIAGNOSIS — G89.29 CHRONIC BILATERAL LOW BACK PAIN WITHOUT SCIATICA: Primary | ICD-10-CM

## 2018-09-27 DIAGNOSIS — M54.50 CHRONIC BILATERAL LOW BACK PAIN WITHOUT SCIATICA: Primary | ICD-10-CM

## 2018-09-27 RX ORDER — IBUPROFEN 600 MG/1
600 TABLET, FILM COATED ORAL EVERY 8 HOURS PRN
Qty: 30 TABLET | Refills: 1 | Status: SHIPPED | OUTPATIENT
Start: 2018-09-27 | End: 2018-10-29

## 2018-09-27 RX ORDER — CYCLOBENZAPRINE HCL 5 MG
5 TABLET ORAL
Qty: 42 TABLET | Refills: 0 | Status: SHIPPED | OUTPATIENT
Start: 2018-09-27 | End: 2019-01-23

## 2018-09-27 NOTE — NURSING NOTE
Due to patient being non-English speaking/uses sign language, an  was used for this visit. Only for face-to-face interpretation by an external agency, date and length of interpretation can be found on the scanned worksheet.       name: Steve Pereyra (Htoo)  Language: Norma  Agency:  [employer]  Phone number: [phone]  Type of interpretation:  Face-to-face, spoken

## 2018-09-27 NOTE — MR AVS SNAPSHOT
"              After Visit Summary   9/27/2018    Mira Segundo    MRN: [id number]           Patient Information     Date Of Birth          1995        Visit Information        Provider Department      9/27/2018 1:10 PM Umesh Ames MD [facility]        Today's Diagnoses     Chronic bilateral low back pain without sciatica    -  1      Care Instructions    Take ibuprofen as needed every 8 hours for pain.     May use flexeril before bed as needed for back pain.     Follow up in 4 weeks.     Nice to meet you,     Umesh Ames, PGY2  [facility]            Follow-ups after your visit        Additional Services     PHYSICAL THERAPY REFERRAL       If you have not heard from the scheduling office within 2 business days, please call [phone] for all locations, with the exception of [place], please call [phone] and [place], please call [phone].    Please be aware that coverage of these services is subject to the terms and limitations of your health insurance plan.  Call member services at your health plan with any benefit or coverage questions.                  Follow-up notes from your care team     Return in about 4 weeks (around 10/25/2018).      Future tests that were ordered for you today     Open Future Orders        Priority Expected Expires Ordered    PHYSICAL THERAPY REFERRAL Routine  9/27/2019 9/27/2018            Who to contact     Please call your clinic at [phone] to:    Ask questions about your health    Make or cancel appointments    Discuss your medicines    Learn about your test results    Speak to your doctor            Additional Information About Your Visit        Care EveryWhere ID     This is your Care EveryWhere ID. This could be used by other organizations to access your [facility] medical records  [id number]        Your Vitals Were     Pulse Temperature Respirations Height Pulse Oximetry BMI (Body Mass Index)    71 97.8  F (36.6  C) (Oral) 16 5' 3.15\" " (160.4 cm) 98% 23.31 kg/m2       Blood Pressure from Last 3 Encounters:   09/27/18 116/75   06/21/17 125/84   05/25/17 120/84    Weight from Last 3 Encounters:   09/27/18 132 lb 3.2 oz (60 kg)   06/21/17 122 lb 3.2 oz (55.4 kg)   05/25/17 118 lb 9.6 oz (53.8 kg)                 Today's Medication Changes          These changes are accurate as of 9/27/18  1:38 PM.  If you have any questions, ask your nurse or doctor.               Start taking these medicines.        Dose/Directions    cyclobenzaprine 5 MG tablet   Commonly known as:  FLEXERIL   Used for:  Chronic bilateral low back pain without sciatica   Started by:  Umesh Ames MD        Dose:  5 mg   Take 1 tablet (5 mg) by mouth nightly as needed for muscle spasms   Quantity:  42 tablet   Refills:  0         These medicines have changed or have updated prescriptions.        Dose/Directions    * ibuprofen 600 MG tablet   Commonly known as:  IBU   This may have changed:  Another medication with the same name was added. Make sure you understand how and when to take each.   Used for:  Jaw pain, Mild persistent asthma, Routine infant or child health check   Changed by:  Umesh Ames MD        Dose:  600 mg   Take 1 tablet (600 mg) by mouth every 6 hours as needed for pain   Quantity:  60 tablet   Refills:  0       * ibuprofen 600 MG tablet   Commonly known as:  ADVIL/MOTRIN   This may have changed:  You were already taking a medication with the same name, and this prescription was added. Make sure you understand how and when to take each.   Used for:  Chronic bilateral low back pain without sciatica   Changed by:  Umesh Ames MD        Dose:  600 mg   Take 1 tablet (600 mg) by mouth every 8 hours as needed for moderate pain   Quantity:  30 tablet   Refills:  1       * Notice:  This list has 2 medication(s) that are the same as other medications prescribed for you. Read the directions carefully, and ask your doctor or other care provider to review them with  you.         Where to get your medicines      [facility] - [place], [place] - [address] [facility]  [address], [place] [address]     Phone:  [phone]     cyclobenzaprine 5 MG tablet    ibuprofen 600 MG tablet                Primary Care Provider Office Phone # Fax #    Lamont Kearney [phone]       [facility] [address] [place] [address]        Equal Access to Services     OLVIN PATTON : Hadii aad ku hadasho Soomaali, waaxda luqadaha, qaybta kaalmada adeegyada, waxay idiin hayaan rach thakkarninoguillaume montano . [facility] [phone].    ATENCIÓN: Si habla español, tiene a poon disposición servicios gratuitos de asistencia lingüística. Elise al [phone].    We comply with applicable federal civil rights laws and [place] laws. We do not discriminate on the basis of race, color, national origin, age, disability, sex, sexual orientation, or gender identity.            Thank you!     Thank you for choosing [facility]  for your care. Our goal is always to provide you with excellent care. Hearing back from our patients is one way we can continue to improve our services. Please take a few minutes to complete the written survey that you may receive in the mail after your visit with us. Thank you!             Your Updated Medication List - Protect others around you: Learn how to safely use, store and throw away your medicines at www.disposemymeds.org.          This list is accurate as of 9/27/18  1:38 PM.  Always use your most recent med list.                   Brand Name Dispense Instructions for use Diagnosis    albuterol 108 (90 Base) MCG/ACT inhaler    PROAIR HFA/PROVENTIL HFA/VENTOLIN HFA    2 Inhaler    Inhale 2 puffs into the lungs every 6 hours as needed for shortness of breath / dyspnea    Intermittent asthma, uncomplicated       beclomethasone 40 MCG/ACT Inhaler    QVAR    1 Inhaler    Inhale 2 puffs into the lungs 2 times daily    Moderate  persistent asthma with acute exacerbation       cyclobenzaprine 5 MG tablet    FLEXERIL    42 tablet    Take 1 tablet (5 mg) by mouth nightly as needed for muscle spasms    Chronic bilateral low back pain without sciatica       fluticasone 110 MCG/ACT Inhaler    FLOVENT HFA    1 Inhaler    Inhale 2 puffs into the lungs 2 times daily    Moderate persistent asthma with acute exacerbation       hydrOXYzine 25 MG tablet    ATARAX    60 tablet    Take 1-2 tablets (25-50 mg) by mouth every 6 hours as needed for itching    Primary insomnia       * ibuprofen 600 MG tablet    IBU    60 tablet    Take 1 tablet (600 mg) by mouth every 6 hours as needed for pain    Jaw pain, Mild persistent asthma, Routine infant or child health check       * ibuprofen 600 MG tablet    ADVIL/MOTRIN    30 tablet    Take 1 tablet (600 mg) by mouth every 8 hours as needed for moderate pain    Chronic bilateral low back pain without sciatica       mirtazapine 15 MG tablet    REMERON    90 tablet    Take 1 tablet (15 mg) by mouth At Bedtime    Adjustment disorder with mixed anxiety and depressed mood       nicotine 10 MG Inhaler    NICOTROL    180 Box    Puff on as needed for smoking cravings    Tobacco use disorder       * Notice:  This list has 2 medication(s) that are the same as other medications prescribed for you. Read the directions carefully, and ask your doctor or other care provider to review them with you.

## 2018-09-27 NOTE — PATIENT INSTRUCTIONS
Take ibuprofen as needed every 8 hours for pain.     May use flexeril before bed as needed for back pain.     Follow up in 4 weeks.     Nice to meet you,     Umesh Ames, PGY2  [facility]      PHYSICAL THERAPY REFERRAL  September 27, 2018 at 2:31 pm  [facility] Physician Referral Form successfully completed - they will contact patient/family to schedule. [facility]

## 2018-09-27 NOTE — PROGRESS NOTES
"     SUBJECTIVE       Lo Sanya is a 23 year old  male with a PMHx significant for   Patient Active Problem List   Diagnosis     Adjustment disorder with mixed anxiety and depressed mood     Chronic motor or vocal tic disorder     Dyshidrosis     Mild intermittent asthma     Beta-Thalassemia Trait     Major depressive disorder, single episode, severe with psychotic features (H)     Who presents today with back pain.     He presents with roughly a year of back pain. No injuries. Started after he stopped taking a sleeping pill. Has pain roughly every day, its there with standing, sitting, or laying. It hurts if he bends over forward. He has no muscle weakness or numbness/tingling in his legs. He has no shooting pains down his legs. No incontinence of urine or stool. He feels the back pain improves if he eats well and sleeps well. He does not exercise. No fevers, chills, or rashes. No other joints are bothering him. Has taken ibuprofen for the pain with mild relief, has not been seen for back pain in the past.     He otherwise feels well and has no other concerns. No chest pain or shortness of breath. No abdominal pain. No headache, lightheadedness, or dizziness.     Patient speaks Norma and so an  was used.    ROS: As stated in HPI.    PMH, Medications and Allergies were reviewed and updated as needed.          OBJECTIVE     Vitals:    09/27/18 1314   BP: 116/75   Pulse: 71   Resp: 16   Temp: 97.8  F (36.6  C)   TempSrc: Oral   SpO2: 98%   Weight: 132 lb 3.2 oz (60 kg)   Height: 5' 3.15\" (160.4 cm)     Body mass index is 23.31 kg/(m^2).    Gen:  Sitting in exam chair, pleasant, NAD  HEENT: Normocephalic, atraumatic. EOMI  Neck: Supple.   Extrem: Warm and well perfused.   Back: No obvious deformity. ROM is full. Mild tenderness to palpation over the lumbar paraspinal muscles bilaterally. No bony tenderness. No SI joint tenderness. Strait leg raise negative. Strength with hip flexion, hip extension, leg " extension, leg flexion, dorsiflexion, and planar flexion 5/5 bilaterally. Reflexes at patellar 2+ and symmetric bilaterally. No clonus. Sensation intact to light touch over the lower extremities.   Neuro: Alert, oriented to person, place, and time  Psych: Mood and affect appropriate    No results found for this or any previous visit (from the past 24 hour(s)).    ASSESSMENT AND PLAN     1. Chronic bilateral low back pain without sciatica  Patient has had low back pain for roughly a year. Bending over makes it worse. It is improved if he gets good sleep and eats well. No red flag symptoms. VS WNL and back exam with mild paraspinal tenderness. Will try ibuprofen for acute pain along with flexeril at bedtime as needed at bedtime. Also recommended physical therapy to improve flexibility and strength. He will follow up in 4 weeks, if at that time it is not improving may need further workup including plane film and inflammatory markers.   - ibuprofen (ADVIL/MOTRIN) 600 MG tablet; Take 1 tablet (600 mg) by mouth every 8 hours as needed for moderate pain  Dispense: 30 tablet; Refill: 1  - cyclobenzaprine (FLEXERIL) 5 MG tablet; Take 1 tablet (5 mg) by mouth nightly as needed for muscle spasms  Dispense: 42 tablet; Refill: 0  - PHYSICAL THERAPY REFERRAL; Future    RTC in 4 weeks for follow up of low back pain or sooner if develops new or worsening symptoms. Return precautions discussed.     Discussed with MD Umesh Caceres, PGY-2  [facility]

## 2018-09-27 NOTE — PROGRESS NOTES
Preceptor Attestation:   Patient seen, evaluated and discussed with the resident. I have verified the content of the note, which accurately reflects my assessment of the patient and the plan of care.   Supervising Physician:  Iker Wang MD

## 2018-10-29 DIAGNOSIS — G89.29 CHRONIC BILATERAL LOW BACK PAIN WITHOUT SCIATICA: ICD-10-CM

## 2018-10-29 DIAGNOSIS — M54.50 CHRONIC BILATERAL LOW BACK PAIN WITHOUT SCIATICA: ICD-10-CM

## 2018-10-29 RX ORDER — IBUPROFEN 600 MG/1
TABLET, FILM COATED ORAL
Qty: 30 TABLET | Refills: 1 | Status: SHIPPED | OUTPATIENT
Start: 2018-10-29 | End: 2018-11-14

## 2018-11-14 ENCOUNTER — OFFICE VISIT (OUTPATIENT)
Dept: FAMILY MEDICINE | Facility: CLINIC | Age: 23
End: 2018-11-14
Payer: COMMERCIAL

## 2018-11-14 VITALS
BODY MASS INDEX: 22.61 KG/M2 | HEART RATE: 95 BPM | SYSTOLIC BLOOD PRESSURE: 142 MMHG | TEMPERATURE: 98.4 F | OXYGEN SATURATION: 99 % | DIASTOLIC BLOOD PRESSURE: 102 MMHG | WEIGHT: 127.6 LBS | HEIGHT: 63 IN | RESPIRATION RATE: 20 BRPM

## 2018-11-14 DIAGNOSIS — J45.41 MODERATE PERSISTENT ASTHMA WITH ACUTE EXACERBATION: ICD-10-CM

## 2018-11-14 DIAGNOSIS — J45.20 INTERMITTENT ASTHMA, UNCOMPLICATED: ICD-10-CM

## 2018-11-14 DIAGNOSIS — F10.10 ALCOHOL ABUSE: ICD-10-CM

## 2018-11-14 DIAGNOSIS — G47.00 INSOMNIA, UNSPECIFIED TYPE: ICD-10-CM

## 2018-11-14 DIAGNOSIS — F32.3 MAJOR DEPRESSIVE DISORDER, SINGLE EPISODE, SEVERE WITH PSYCHOTIC FEATURES (H): Primary | ICD-10-CM

## 2018-11-14 RX ORDER — LANOLIN ALCOHOL/MO/W.PET/CERES
3 CREAM (GRAM) TOPICAL
Qty: 90 TABLET | Refills: 3 | Status: SHIPPED | OUTPATIENT
Start: 2018-11-14 | End: 2018-12-26

## 2018-11-14 RX ORDER — QUETIAPINE FUMARATE 50 MG/1
TABLET, FILM COATED ORAL
Qty: 90 TABLET | Refills: 1 | Status: SHIPPED | OUTPATIENT
Start: 2018-11-14 | End: 2018-12-26

## 2018-11-14 RX ORDER — FLUTICASONE PROPIONATE 110 UG/1
2 AEROSOL, METERED RESPIRATORY (INHALATION) 2 TIMES DAILY
Qty: 1 INHALER | Refills: 1 | Status: SHIPPED | OUTPATIENT
Start: 2018-11-14 | End: 2018-12-26

## 2018-11-14 RX ORDER — ALBUTEROL SULFATE 90 UG/1
2 AEROSOL, METERED RESPIRATORY (INHALATION) EVERY 6 HOURS PRN
Qty: 2 INHALER | Refills: 0 | Status: SHIPPED | OUTPATIENT
Start: 2018-11-14 | End: 2018-12-26

## 2018-11-14 ASSESSMENT — ANXIETY QUESTIONNAIRES
5. BEING SO RESTLESS THAT IT IS HARD TO SIT STILL: MORE THAN HALF THE DAYS
3. WORRYING TOO MUCH ABOUT DIFFERENT THINGS: MORE THAN HALF THE DAYS
1. FEELING NERVOUS, ANXIOUS, OR ON EDGE: MORE THAN HALF THE DAYS
2. NOT BEING ABLE TO STOP OR CONTROL WORRYING: MORE THAN HALF THE DAYS
IF YOU CHECKED OFF ANY PROBLEMS ON THIS QUESTIONNAIRE, HOW DIFFICULT HAVE THESE PROBLEMS MADE IT FOR YOU TO DO YOUR WORK, TAKE CARE OF THINGS AT HOME, OR GET ALONG WITH OTHER PEOPLE: SOMEWHAT DIFFICULT
7. FEELING AFRAID AS IF SOMETHING AWFUL MIGHT HAPPEN: NEARLY EVERY DAY
6. BECOMING EASILY ANNOYED OR IRRITABLE: MORE THAN HALF THE DAYS
GAD7 TOTAL SCORE: 16

## 2018-11-14 ASSESSMENT — PATIENT HEALTH QUESTIONNAIRE - PHQ9
5. POOR APPETITE OR OVEREATING: NEARLY EVERY DAY
SUM OF ALL RESPONSES TO PHQ QUESTIONS 1-9: 18

## 2018-11-14 ASSESSMENT — PAIN SCALES - GENERAL: PAINLEVEL: SEVERE PAIN (7)

## 2018-11-14 NOTE — PROGRESS NOTES
"       SUBJECTIVE       Mira Segundo is a 23 year old  male with a PMH significant for:     Patient Active Problem List   Diagnosis     Adjustment disorder with mixed anxiety and depressed mood     Chronic motor or vocal tic disorder     Dyshidrosis     Mild intermittent asthma     Beta-Thalassemia Trait     Major depressive disorder, single episode, severe with psychotic features (H)     He presents with insomnia, depression, paranoia. These have gotten worse. About 6 months ago, felt like he had a stalker. Continues to have these symptoms, sometimes is able to identify them as symptoms vs reality. Denies visual or auditory hallucinations. Endorses increased symptoms of depression, trouble sleeping. There are periods during most weeks where he stays up for >24 hours at a time. Patient states that he is using alcohol to help him sleep. He is also drinking about 6 beers+shots of alcohol a few days per week. States that he knows this is unhealthy use of alcohol. He is trying to cut down and it has been about 1 week since his last drink. Denies suicidal ideation.    Patient worries that he has been experiencing symptoms of withdrawal after stopping olanzapine about 1 year ago. He is hesitant to restart this medication.     PMH, Medications and Allergies were reviewed and updated as needed.        REVIEW OF SYSTEMS     See HPI        OBJECTIVE     Vitals:    11/14/18 1036   BP: (!) 142/102   Pulse: 95   Resp: 20   Temp: 98.4  F (36.9  C)   TempSrc: Oral   SpO2: 99%   Weight: 127 lb 9.6 oz (57.9 kg)   Height: 5' 2.8\" (159.5 cm)     Body mass index is 22.75 kg/(m^2).    Gen: Well-appearing young man. Alert, oriented and appropriate. NAD  MENTAL STATUS EXAM  Appearance: appropriate  Attitude: cooperative  Behavior: normal  Eye Contact: normal  Speech: normal  Orientation: oriented to person , place, time and situation  Mood: \"down\"  Affect: Mood Congruent  Thought Process: clear during the appt, endorses paranoid " thoughts  Suicidal Ideation: denies  Hallucination: no        No results found for this or any previous visit (from the past 24 hour(s)).        ASSESSMENT AND PLAN     1. Major depressive disorder, single episode, severe with psychotic features (H)  Pt has known hx of severe depression with psychotic features and PTSD. Chart review shows that he stopped his olanzapine and serotonin specific reuptake inhibitor about 1 year ago due to side effects. He is open to restarting a medication today. We will initiate an antipsychotic as he expresses considerable distress related to his paranoid thoughts. Would add an serotonin specific reuptake inhibitor in the future if patient is agreeable. Do feel that he should be followed by psychiatry given the severity of his symptoms; mental health referral placed today. Discussed red flags and warning signs; he denies suicidality. Crisis resources provided.   - QUEtiapine (SEROQUEL) 50 MG tablet; Take 50 mg daily at bedtime on days 1 and 2, increase to 150 mg daily at bedtime thereafter  Dispense: 90 tablet; Refill: 1  - MENTAL HEALTH REFERRAL  -  -  : Sign Language or Oral - 53-67 minutes  - RTC in 1 month for recheck    2. Insomnia, unspecified type  Suspect that much of his insomnia is due to depression in combination with alcohol misuse. We will initiate antipsychotic as above. Instructed the patient to take it at bedtime. We will try melatonin as well.  - melatonin 3 MG tablet; Take 1 tablet (3 mg) by mouth nightly as needed for sleep  Dispense: 90 tablet; Refill: 3    3. Alcohol abuse  Reviewed healthy alcohol use recommendations. Recommend complete cessation at this time. Patient expresses understanding and agreement.     4. Moderate persistent asthma with acute exacerbation  Refill provided  - fluticasone (FLOVENT HFA) 110 MCG/ACT Inhaler; Inhale 2 puffs into the lungs 2 times daily  Dispense: 1 Inhaler; Refill: 1    5. Intermittent asthma, uncomplicated  Refill  provided  - albuterol (PROAIR HFA/PROVENTIL HFA/VENTOLIN HFA) 108 (90 Base) MCG/ACT inhaler; Inhale 2 puffs into the lungs every 6 hours as needed for shortness of breath / dyspnea  Dispense: 2 Inhaler; Refill: 0        RTC in 1 month for follow up of depression and PTSD or sooner if develops new or worsening symptoms.    Kenna Bach I precepted today with Lamont Kearney MD.

## 2018-11-14 NOTE — MR AVS SNAPSHOT
After Visit Summary   11/14/2018    Mira Segundo    MRN: [id number]           Patient Information     Date Of Birth          1995        Visit Information        Provider Department      11/14/2018 10:00 AM Kenna Bach MD [facility]        Today's Diagnoses     Major depressive disorder, single episode, severe with psychotic features (H)    -  1    Insomnia, unspecified type        Alcohol abuse        Moderate persistent asthma with acute exacerbation        Intermittent asthma, uncomplicated          Care Instructions    To do list:  1) Start quetiapine  2) Start melatonin  3) Psychiatry referral  4) Asthma inhaler refill    I would like to see you back in 1 month    If you have thoughts about self harm or if you just need additional support and care, here are some resources for you:    Crisis Lines:    [facility] Adult Crisis:  [phone]  [facility] Multilingual Crisis Line:  [phone]  [facility] Adult Crisis:  [phone]    Crisis Text Line: Text [place] to 337993. Free support at your fingertips 24/7  People who text [place] to 390996 will be connected with a counselor. Crisis Text Line is available 24 hours a day, seven days a week.    You can also consider going to the Urgent [facility] Center [facility] at the following address.  Walk ins are welcome:    [address] [place] [address]   [phone] (for 24 hour crisis consultation)    Monday - Friday 8:00am - 7:00pm  Saturday:  11:00am - 3:00pm  Sunday and Holidays Closed    If you feel at risk of immediate harm, go directly to the Emergency Department.            Follow-ups after your visit        Additional Services     MENTAL HEALTH REFERRAL  -       Use this form for behavioral health consults and assessments. The referral coordinator will help to determine whether patients are best served by clinic behavioral health staff or by community providers.    Presenting Problem: Depression with psychosis    Currently  "having suicidal thoughts: No  Previous psych hospitalization: Yes    Type of referral(s) requested (indicate all that apply):  Adult Psychiatry--for long term medication management., needing a higher level of care than primary care can provide   Patient has been seen at [facility] in the past, would like to return there       needed:Yes  Language: Norma                  Who to contact     Please call your clinic at [phone] to:    Ask questions about your health    Make or cancel appointments    Discuss your medicines    Learn about your test results    Speak to your doctor            Additional Information About Your Visit        Care EveryWhere ID     This is your Care EveryWhere ID. This could be used by other organizations to access your [facility] medical records  [id number]        Your Vitals Were     Pulse Temperature Respirations Height Pulse Oximetry BMI (Body Mass Index)    95 98.4  F (36.9  C) (Oral) 20 5' 2.8\" (159.5 cm) 99% 22.75 kg/m2       Blood Pressure from Last 3 Encounters:   11/14/18 (!) 142/102   09/27/18 116/75   06/21/17 125/84    Weight from Last 3 Encounters:   11/14/18 127 lb 9.6 oz (57.9 kg)   09/27/18 132 lb 3.2 oz (60 kg)   06/21/17 122 lb 3.2 oz (55.4 kg)              We Performed the Following     MENTAL HEALTH REFERRAL  -          Today's Medication Changes          These changes are accurate as of 11/14/18 11:35 AM.  If you have any questions, ask your nurse or doctor.               Start taking these medicines.        Dose/Directions    melatonin 3 MG tablet   Used for:  Insomnia, unspecified type   Started by:  Kenna Bach MD        Dose:  3 mg   Take 1 tablet (3 mg) by mouth nightly as needed for sleep   Quantity:  90 tablet   Refills:  3       QUEtiapine 50 MG tablet   Commonly known as:  SEROquel   Used for:  Major depressive disorder, single episode, severe with psychotic features (H)   Started by:  Kenna Bach MD        Take 50 mg daily at bedtime on " days 1 and 2, increase to 150 mg daily at bedtime thereafter   Quantity:  90 tablet   Refills:  1         These medicines have changed or have updated prescriptions.        Dose/Directions    ibuprofen 600 MG tablet   Commonly known as:  IBU   This may have changed:  Another medication with the same name was removed. Continue taking this medication, and follow the directions you see here.   Used for:  Jaw pain, Mild persistent asthma, Routine infant or child health check   Changed by:  Kenna Bach MD        Dose:  600 mg   Take 1 tablet (600 mg) by mouth every 6 hours as needed for pain   Quantity:  60 tablet   Refills:  0         Stop taking these medicines if you haven't already. Please contact your care team if you have questions.     beclomethasone 40 MCG/ACT Aers IS A DISCONTINUED MEDICATION   Commonly known as:  QVAR   Stopped by:  Kenna Bach MD           hydrOXYzine 25 MG tablet   Commonly known as:  ATARAX   Stopped by:  Kenna Bach MD           mirtazapine 15 MG tablet   Commonly known as:  REMERON   Stopped by:  Kenna Bach MD           nicotine 10 MG Inhaler   Commonly known as:  NICOTROL   Stopped by:  Kenna Bach MD                Where to get your medicines      These medications were sent to [facility] - [place], [place] - [address], [place] [address]     Phone:  [phone]     albuterol 108 (90 Base) MCG/ACT inhaler    fluticasone 110 MCG/ACT Inhaler    melatonin 3 MG tablet    QUEtiapine 50 MG tablet                Primary Care Provider Office Phone # Fax #    Lamont Kearney [phone]       [facility] [address] [place] [address]        Equal Access to Services     [facility]: Hadii daya borjas Sop[place]ito, waaxda luqadaha, qaybta kaalmada kenyatta, kenney kirkland. [facility] [phone].    ATENCIÓN: Si habla español, tiene a poon disposición servicios gratuitos de  asistencia lingüística. Elise al [phone].    We comply with applicable federal civil rights laws and [place] laws. We do not discriminate on the basis of race, color, national origin, age, disability, sex, sexual orientation, or gender identity.            Thank you!     Thank you for choosing [facility]  for your care. Our goal is always to provide you with excellent care. Hearing back from our patients is one way we can continue to improve our services. Please take a few minutes to complete the written survey that you may receive in the mail after your visit with us. Thank you!             Your Updated Medication List - Protect others around you: Learn how to safely use, store and throw away your medicines at www.disposemymeds.org.          This list is accurate as of 11/14/18 11:35 AM.  Always use your most recent med list.                   Brand Name Dispense Instructions for use Diagnosis    albuterol 108 (90 Base) MCG/ACT inhaler    PROAIR HFA/PROVENTIL HFA/VENTOLIN HFA    2 Inhaler    Inhale 2 puffs into the lungs every 6 hours as needed for shortness of breath / dyspnea    Intermittent asthma, uncomplicated       cyclobenzaprine 5 MG tablet    FLEXERIL    42 tablet    Take 1 tablet (5 mg) by mouth nightly as needed for muscle spasms    Chronic bilateral low back pain without sciatica       fluticasone 110 MCG/ACT Inhaler    FLOVENT HFA    1 Inhaler    Inhale 2 puffs into the lungs 2 times daily    Moderate persistent asthma with acute exacerbation       ibuprofen 600 MG tablet    IBU    60 tablet    Take 1 tablet (600 mg) by mouth every 6 hours as needed for pain    Jaw pain, Mild persistent asthma, Routine infant or child health check       melatonin 3 MG tablet     90 tablet    Take 1 tablet (3 mg) by mouth nightly as needed for sleep    Insomnia, unspecified type       QUEtiapine 50 MG tablet    SEROquel    90 tablet    Take 50 mg daily at bedtime on days 1 and 2, increase to 150 mg daily at  bedtime thereafter    Major depressive disorder, single episode, severe with psychotic features (H)

## 2018-11-14 NOTE — PROGRESS NOTES
Preceptor Attestation:   Patient seen, evaluated and discussed with the resident. I have verified the content of the note, which accurately reflects my assessment of the patient and the plan of care.   Supervising Physician:  Lamont Kearney MD

## 2018-11-14 NOTE — PATIENT INSTRUCTIONS
To do list:  1) Start quetiapine  2) Start melatonin  3) Psychiatry referral  4) Asthma inhaler refill    I would like to see you back in 1 month    If you have thoughts about self harm or if you just need additional support and care, here are some resources for you:    Crisis Lines:    [facility] Adult Crisis:  [phone]  [facility] Multilingual Crisis Line:  [phone]  [facility] Adult Crisis:  [phone]    Crisis Text Line: Text [place] to 648295. Free support at your fingertips 24/7  People who text [place] to 637713 will be connected with a counselor. Crisis Text Line is available 24 hours a day, seven days a week.    You can also consider going to the [facility] at the following address.  Walk ins are welcome:    [address] [place] [address]   [phone] (for 24 hour crisis consultation)    Monday - Friday 8:00am - 7:00pm  Saturday:  11:00am - 3:00pm  Sunday and Holidays Closed    If you feel at risk of immediate harm, go directly to the Emergency Department.    MENTAL HEALTH REFERRAL  November 14, 2018 at 4:10 pm Referral forwarded to JIN Stinson who will process with Behavioral Health and assist with scheduling. [facility]

## 2018-11-15 ENCOUNTER — DOCUMENTATION ONLY (OUTPATIENT)
Dept: PSYCHOLOGY | Facility: CLINIC | Age: 23
End: 2018-11-15

## 2018-11-15 ASSESSMENT — ASTHMA QUESTIONNAIRES: ACT_TOTALSCORE: 20

## 2018-11-15 ASSESSMENT — ANXIETY QUESTIONNAIRES: GAD7 TOTAL SCORE: 16

## 2018-11-15 NOTE — PROGRESS NOTES
Behavioral Health Team,    Patient is being referred for mental health services by their provider Dr. Bach. Please advise if we are able to see patient for in house treatment or if a community option would be best.    Thank you.     Mari  Care Coordinator

## 2018-11-15 NOTE — LETTER
November 21, 2018      Mira Segundo  [address]  [place] [address]      Dear Mira,    We have attempted to contact you to discuss a mental health referral that was recently placed by . The phone number we have on file is not in services. Please call [facility] to follow up on this referral. You can ask to speak with Mari.          Sincerely,        Mari Bruno

## 2018-11-19 NOTE — PROGRESS NOTES
Review of Dr. Bach's order and note indicates that this is a referral for community based psychiatry to treat depression with psychosis.  Has been seen at [facility] in the past and would like to return there if possible.  It is unclear to me that [facility] provides psychiatry services unless one is enrolled in ACT program so not sure if patient's plan for return to [facility] for this service is feasible at this time.  Will ask our referral coordinators to contact [facility] to check in on this prior to follow up with patient.  If this is not an option, will ask our referral coordinators to connect patient to one of the other [facility] based resources for psychiatry below:    [facility]  [address] [place] [address]  [phone]    [facility]  [address]  [address]  [place], [place] [address]  [phone]    [facility]  [address]   [address]  [place] [address]  [phone] (for appointments)  Fax: [phone]  [facility]  [address]  [address]  [place] [address]  Phone:  [phone]  Fax: [phone]  Hours:  Monday - Friday 8:30 - 5:00pm    [facility]  [address]  [place] [address]  [phone]    [facility]  [address]  [place], [place] [address] [phone]    Let me know if you have questions or would like additional follow up from me.  Thanks!      Alexandria Rivas, Ph.D.,LP     Disclaimer  The above treatment recommendations are based on consultation with the patient's primary care provider and a review of relevant information in EPIC.? I have not personally examined the patient.? All recommendations should be implemented with considerations of the patient's relevant prior history and current clinical status.  Please contact me with any questions  about the care of this patient.

## 2018-11-21 NOTE — PROGRESS NOTES
JUAN M attempted outreach to patient using [facility] Soraida Green . The phone number is not in service.     JUAN M mailed letter to patient on this date.     EMILIANO Jasmine

## 2018-12-19 ENCOUNTER — OFFICE VISIT (OUTPATIENT)
Dept: FAMILY MEDICINE | Facility: CLINIC | Age: 23
End: 2018-12-19
Payer: COMMERCIAL

## 2018-12-19 VITALS
OXYGEN SATURATION: 96 % | HEIGHT: 62 IN | DIASTOLIC BLOOD PRESSURE: 92 MMHG | SYSTOLIC BLOOD PRESSURE: 134 MMHG | WEIGHT: 133 LBS | HEART RATE: 85 BPM | RESPIRATION RATE: 20 BRPM | TEMPERATURE: 98.3 F | BODY MASS INDEX: 24.48 KG/M2

## 2018-12-19 DIAGNOSIS — Z23 NEED FOR VACCINATION: ICD-10-CM

## 2018-12-19 DIAGNOSIS — F32.3 MAJOR DEPRESSIVE DISORDER, SINGLE EPISODE, SEVERE WITH PSYCHOTIC FEATURES (H): Primary | ICD-10-CM

## 2018-12-19 DIAGNOSIS — G47.00 INSOMNIA, UNSPECIFIED TYPE: ICD-10-CM

## 2018-12-19 SDOH — HEALTH STABILITY: MENTAL HEALTH: HOW OFTEN DO YOU HAVE A DRINK CONTAINING ALCOHOL?: NEVER

## 2018-12-19 ASSESSMENT — PATIENT HEALTH QUESTIONNAIRE - PHQ9
SUM OF ALL RESPONSES TO PHQ QUESTIONS 1-9: 17
5. POOR APPETITE OR OVEREATING: MORE THAN HALF THE DAYS

## 2018-12-19 ASSESSMENT — ANXIETY QUESTIONNAIRES
5. BEING SO RESTLESS THAT IT IS HARD TO SIT STILL: MORE THAN HALF THE DAYS
IF YOU CHECKED OFF ANY PROBLEMS ON THIS QUESTIONNAIRE, HOW DIFFICULT HAVE THESE PROBLEMS MADE IT FOR YOU TO DO YOUR WORK, TAKE CARE OF THINGS AT HOME, OR GET ALONG WITH OTHER PEOPLE: SOMEWHAT DIFFICULT
7. FEELING AFRAID AS IF SOMETHING AWFUL MIGHT HAPPEN: MORE THAN HALF THE DAYS
GAD7 TOTAL SCORE: 15
2. NOT BEING ABLE TO STOP OR CONTROL WORRYING: NEARLY EVERY DAY
6. BECOMING EASILY ANNOYED OR IRRITABLE: MORE THAN HALF THE DAYS
1. FEELING NERVOUS, ANXIOUS, OR ON EDGE: MORE THAN HALF THE DAYS
3. WORRYING TOO MUCH ABOUT DIFFERENT THINGS: MORE THAN HALF THE DAYS

## 2018-12-19 ASSESSMENT — MIFFLIN-ST. JEOR: SCORE: 1480.78

## 2018-12-19 ASSESSMENT — PAIN SCALES - GENERAL: PAINLEVEL: NO PAIN (0)

## 2018-12-19 NOTE — PATIENT INSTRUCTIONS
To do list:  1) Restart fluoxetine 20 mg daily  2) Chiara will call you about the mental health referral    I would like to see you back in 1 month    MENTAL HEALTH REFERRAL   December 21, 2018 at 8:16 am Called [employer] for a Norma  ID #[id number] - when calling the phone number listed it is not a working number. No additional numbers listed. Pau Norman, MAURILIO

## 2018-12-19 NOTE — NURSING NOTE
Chief Complaint   Patient presents with     RECHECK     F/U for Mood and Sleep Problems and Medication Check      Kofi Reich CMA      Due to patient being non-English speaking/uses sign language, an  was used for this visit. Only for face-to-face interpretation by an external agency, date and length of interpretation can be found on the scanned worksheet.     name: NIKHIL BRITTON  Agency: [facility]  Language: Norma   Telephone number: [phone]  Type of interpretation: Face-to-face, spoken     Kofi Reich CMA

## 2018-12-19 NOTE — PROGRESS NOTES
Preceptor Attestation:   Patient seen, evaluated and discussed with the resident. I have verified the content of the note, which accurately reflects my assessment of the patient and the plan of care.   Supervising Physician:  Felice Mercado MD

## 2018-12-19 NOTE — PROGRESS NOTES
"       SUBJECTIVE       Lo Sanya is a 23 year old  male with a PMH significant for:     Patient Active Problem List   Diagnosis     Adjustment disorder with mixed anxiety and depressed mood     Chronic motor or vocal tic disorder     Dyshidrosis     Mild intermittent asthma     Beta-Thalassemia Trait     Major depressive disorder, single episode, severe with psychotic features (H)     He presents in follow up for sleep and mood. Today patient states that sleep continues to be a problem. Melatonin helps, but makes him drowsy in the AM.     Reports that he has been taking the seroquel as prescribed. Started at 50 mg nightly, then increased to 150 mg. Doesn't really feel like it's doing anything. Continues to have paranoid thoughts like someone is following him. Has dreams that someone is chasing him or the police are chasing him. Denies personal history of trauma. Does state that he has felt traumatized by hearing his parents stories of being tortured, etc. Dad reports that mom has a history of similar problems. Denies SI or HI.     Patient has not received a call from the clinic regarding his psychiatry and psychology referrals. Does admit that his phone has been off or without battery quite a bit of the time. Gives verbal permission to reach out to his father if he cannot be reached by phone.     PMH, Medications and Allergies were reviewed and updated as needed.        REVIEW OF SYSTEMS     See HPI        OBJECTIVE     Vitals:    12/19/18 1631   BP: (!) 134/92   Pulse: 85   Resp: 20   Temp: 98.3  F (36.8  C)   TempSrc: Oral   SpO2: 96%   Weight: 60.3 kg (133 lb)   Height: 1.58 m (5' 2.21\")     Body mass index is 24.17 kg/m .    Gen: Well-appearing adult male. Alert, oriented and appropriate. NAD  HEENT: MMM  CV: Normal capillary refill  Pulm: Breathing comfortably on room air  MENTAL STATUS EXAM  Appearance: appropriate  Attitude: cooperative  Behavior: normal  Eye Contact: intermittent  Speech: normal  Orientation: " oriented to person , place, time and situation  Mood: Down  Affect: Mood Congruent  Thought Process: clear  Suicidal Ideation: denies  Hallucination: endorses paranoid thoughts    No results found for this or any previous visit (from the past 24 hour(s)).        ASSESSMENT AND PLAN     1. Major depressive disorder, single episode, severe with psychotic features (H)  Patient was started on low dose seroquel one month ago. Has not noted significant improvement in his symptoms. We will add serotonin specific reuptake inhibitor today, prozac 20 mg daily. Reviewed risks and benefits, 4-6 week initiation period today. This combination may cause QT prolongation, plan to check EKG at a future visit. Pt denies SI/HI today. Again discussed red flags signs/sx. We will continue to pursue establishment of care with both psychiatry and psychology.   -  : Sign Language or Oral - 53-67 minutes    2. Insomnia, unspecified type  Continue melatonin. Suspect that sleep will improve as depression is treated.     3. Need for vaccination  - ADMIN VACCINE, INITIAL  - ADMIN VACCINE, EACH ADDITIONAL  - HPV9 (Gardasil 9 )  - TDAP VACCINE (BOOSTRIX)  - FLU VAC QUADRIVLENT SPLIT VIRUS IM 0.5ml dosage        RTC in 1 month for follow up of depression or sooner if develops new or worsening symptoms.    Kenna Bach I precepted today with Felice Mercado MD.

## 2018-12-20 ASSESSMENT — ANXIETY QUESTIONNAIRES: GAD7 TOTAL SCORE: 15

## 2018-12-26 ENCOUNTER — OFFICE VISIT (OUTPATIENT)
Dept: FAMILY MEDICINE | Facility: CLINIC | Age: 23
End: 2018-12-26
Payer: COMMERCIAL

## 2018-12-26 VITALS
HEIGHT: 63 IN | BODY MASS INDEX: 24.17 KG/M2 | SYSTOLIC BLOOD PRESSURE: 127 MMHG | TEMPERATURE: 98.7 F | WEIGHT: 136.4 LBS | DIASTOLIC BLOOD PRESSURE: 83 MMHG | HEART RATE: 102 BPM | RESPIRATION RATE: 16 BRPM | OXYGEN SATURATION: 97 %

## 2018-12-26 DIAGNOSIS — J45.41 MODERATE PERSISTENT ASTHMA WITH ACUTE EXACERBATION: ICD-10-CM

## 2018-12-26 DIAGNOSIS — G47.00 INSOMNIA, UNSPECIFIED TYPE: ICD-10-CM

## 2018-12-26 DIAGNOSIS — F32.3 MAJOR DEPRESSIVE DISORDER, SINGLE EPISODE, SEVERE WITH PSYCHOTIC FEATURES (H): ICD-10-CM

## 2018-12-26 DIAGNOSIS — R68.84 JAW PAIN: ICD-10-CM

## 2018-12-26 RX ORDER — QUETIAPINE FUMARATE 300 MG/1
TABLET, FILM COATED ORAL
Qty: 90 TABLET | Refills: 1 | Status: SHIPPED | OUTPATIENT
Start: 2018-12-26 | End: 2019-08-05

## 2018-12-26 RX ORDER — LANOLIN ALCOHOL/MO/W.PET/CERES
6 CREAM (GRAM) TOPICAL
Qty: 90 TABLET | Refills: 3 | Status: SHIPPED | OUTPATIENT
Start: 2018-12-26 | End: 2019-08-05

## 2018-12-26 RX ORDER — IBUPROFEN 600 MG/1
600 TABLET, FILM COATED ORAL EVERY 6 HOURS PRN
Qty: 60 TABLET | Refills: 0 | Status: SHIPPED | OUTPATIENT
Start: 2018-12-26 | End: 2019-01-24

## 2018-12-26 RX ORDER — ALBUTEROL SULFATE 90 UG/1
2 AEROSOL, METERED RESPIRATORY (INHALATION) EVERY 6 HOURS PRN
Qty: 2 INHALER | Refills: 3 | Status: SHIPPED | OUTPATIENT
Start: 2018-12-26 | End: 2019-08-05

## 2018-12-26 RX ORDER — FLUTICASONE PROPIONATE 110 UG/1
2 AEROSOL, METERED RESPIRATORY (INHALATION) 2 TIMES DAILY
Qty: 1 INHALER | Refills: 3 | Status: SHIPPED | OUTPATIENT
Start: 2018-12-26 | End: 2018-12-26

## 2018-12-26 ASSESSMENT — MIFFLIN-ST. JEOR: SCORE: 1505.66

## 2018-12-26 NOTE — PROGRESS NOTES
"Preceptor attestation:  Vital signs reviewed: /83   Pulse 102   Temp 98.7  F (37.1  C) (Oral)   Resp 16   Ht 1.595 m (5' 2.8\")   Wt 61.9 kg (136 lb 6.4 oz)   SpO2 97%   BMI 24.32 kg/m      Patient seen, evaluated, and discussed with the resident.  I have verified the content of the note, which accurately reflects my assessment of the patient and the plan of care.    Supervising physician: Ashtyn Poe MD  [facility]  "

## 2018-12-26 NOTE — PROGRESS NOTES
"       SUBJECTIVE       Mira Segundo is a 23 year old  male with a PMH significant for:     Patient Active Problem List   Diagnosis     Adjustment disorder with mixed anxiety and depressed mood     Chronic motor or vocal tic disorder     Dyshidrosis     Mild intermittent asthma     Beta-Thalassemia Trait     Major depressive disorder, single episode, severe with psychotic features (H)     He presents with depressed mood and difficulty sleeping. He was seen 12/19/18 for the same; prozac started at that visit. He was also recently started on quetiapine, 150 mg at bedtime. Patient states that he has been taking the prozac daily. Thinks that he has noticed a mild improvement in his mood during the day. Reports that he is only sleeping about 2 hours per night.     Pt also reports that his asthma has been worse recently. He reports that it is worsened with activity, or e.g. Climbing stairs. He is using his albuterol inhaler more than once per day. States that he needs refills of his every day ICS inhaler.     PMH, Medications and Allergies were reviewed and updated as needed.        REVIEW OF SYSTEMS     See HPI        OBJECTIVE     Vitals:    12/26/18 1530   BP: 127/83   Pulse: 102   Resp: 16   Temp: 98.7  F (37.1  C)   TempSrc: Oral   SpO2: 97%   Weight: 61.9 kg (136 lb 6.4 oz)   Height: 1.595 m (5' 2.8\")     Body mass index is 24.32 kg/m .    Gen: Well-appearing adult male. Alert, oriented and appropriate. NAD  HEENT: MMM  CV: RRR, no rubs, murmurs or extra heart sounds  Pulm: CTAB, no wheezes, rales or rhonchi  MENTAL STATUS EXAM  Appearance: appropriate  Attitude: cooperative  Behavior: normal  Eye Contact: normal  Speech: normal  Orientation: oriented to person , place, time and situation  Mood: depressed  Affect: Mood Congruent  Thought Process: clear  Suicidal Ideation: denies  Hallucination: no      No results found for this or any previous visit (from the past 24 hour(s)).        ASSESSMENT AND PLAN     1. Major " depressive disorder, single episode, severe with psychotic features (H)  Patient has seen me several times over the past few weeks. We will continue the prozac at 20 mg daily. Reviewed with patient that this will take 4-6 weeks to see full effect. We will increase his seroquel to 300 mg nightly, which should get him up to a more therapeutic dose and improve sleep. Discussed that he can also increase OTC melatonin from 3-6 hours. Brief discussion regarding sleep hygiene. Will continue to work with patient on this going forward. He has been unable to connect with the referral coordinator due to problems with his phone. I emphasized the importance of establishing care with psych again today. Patient again gave verbal permission to contact his father if he cannot be reached.   - QUEtiapine (SEROQUEL) 300 MG tablet; Take 50 mg daily at bedtime on days 1 and 2, increase to 150 mg daily at bedtime thereafter  Dispense: 90 tablet; Refill: 1  - FLUoxetine (PROZAC) 20 MG capsule; Take 1 capsule (20 mg) by mouth daily  Dispense: 90 capsule; Refill: 1    2. Jaw pain  Refill requested  - ibuprofen (IBU) 600 MG tablet; Take 1 tablet (600 mg) by mouth every 6 hours as needed for pain  Dispense: 60 tablet; Refill: 0    3. Insomnia, unspecified type  As above  - melatonin 3 MG tablet; Take 2 tablets (6 mg) by mouth nightly as needed for sleep  Dispense: 90 tablet; Refill: 3  -  : Sign Language or Oral - 53-67 minutes    4. Moderate persistent asthma with acute exacerbation  ACT of 10 today. Patient has not been taking his controller as prescribed as he had run out of refills. We will restart this today. PRN albuterol also refilled.   - albuterol (PROAIR HFA/PROVENTIL HFA/VENTOLIN HFA) 108 (90 Base) MCG/ACT inhaler; Inhale 2 puffs into the lungs every 6 hours as needed for shortness of breath / dyspnea  Dispense: 2 Inhaler; Refill: 3  - fluticasone (ARNUITY ELLIPTA) 100 MCG/ACT inhaler; Inhale 1 puff into the lungs daily   Dispense: 2 each; Refill: 3        RTC in 1 month for follow up of depression, asthma or sooner if develops new or worsening symptoms.    Kenna Bach    I precepted today with Ashtyn Poe MD.

## 2018-12-26 NOTE — NURSING NOTE
Due to patient being non-English speaking/uses sign language, an  was used for this visit. Only for face-to-face interpretation by an external agency, date and length of interpretation can be found on the scanned worksheet.     name: Steve Pereyra (Htoo)  Agency: [facility]  Language: Norma   Telephone number: [phone]  Type of interpretation: Face-to-face, spoken

## 2018-12-27 ENCOUNTER — TELEPHONE (OUTPATIENT)
Dept: FAMILY MEDICINE | Facility: CLINIC | Age: 23
End: 2018-12-27

## 2018-12-27 ASSESSMENT — ASTHMA QUESTIONNAIRES: ACT_TOTALSCORE: 10

## 2018-12-27 NOTE — TELEPHONE ENCOUNTER
Called [employer] for a Norma  ID #[id number] - was able to reach father at [phone] - states they can go any time. Advised will send information over to Lilly who will contact him to assist with scheduling Lo Ko. No additional questions at this time.

## 2018-12-27 NOTE — TELEPHONE ENCOUNTER
----- Message from Kenna Bach MD sent at 12/26/2018  2:03 PM CST -----  Neil Guadarrama,     I put a phone number in the chart for his Dad at the last visit. I believe this is listed under emergency contact. He gave verbal permission to speak with his dad.     ThanksKenna   ----- Message -----  From: Pau Norman CMA  Sent: 12/21/2018   8:18 AM  To: MD Dr. Mikala Miranda,  Just wanted to let you know I tried to reach patient. Called [employer] for a Norma  ID #[id number] - the phone number listed it is not a working number. No additional numbers listed.  ThanksChiaar

## 2018-12-27 NOTE — TELEPHONE ENCOUNTER
Spoke with Jesica - was advised to fax over referral form and she will contact patient to schedule through a Norma .

## 2018-12-27 NOTE — LETTER
January 7, 2019      Mira Segundo  [address] [address]        Dear Mira,    Here is the information regarding your upcoming appointment    [facility] [address]  [place] [id number]     Appointment:  Monday January 14, 2019  Arrival Time:  12:30 pm  Provider:  Sabrina Green  has been requested for your appointment     The first appointment is for Therapy Intake which has to be completed prior to seeing Psychiatry for medication management.  Please bring a copy of your insurance card and photo ID. If you cannot make this appointment please call [phone] to reschedule     [facility] [address] [address]     Appointment:  Tuesday March 5, 2019  Arrival Time:  11:00 am  Provider:  Tong Nance MS, PA-C for Medication Management      A Norma  will be requested for your appointment     Please bring a copy of your insurance card and photo ID. If you cannot make this appointment please call [phone] to reschedule.      Thank you,      Chiara Norman CMA, Referral Coordinator  [facility]  [phone]

## 2018-12-28 NOTE — TELEPHONE ENCOUNTER
Received call from Jesica  with [facility]. After reviewing referral - they do not have Outpatient Psychiatry services for adults so for this specific referral they are unable to assist.    Sending message to ordering Provider for follow up.

## 2018-12-31 NOTE — TELEPHONE ENCOUNTER
"Per Dr. Rivas's Nov. 19, 2018 note:    Please schedule Lo Ko at one of our \"Community providers\" listed below:    [facility]  [address]  [address]  [place], [place] [address]  [phone]     [facility]  [address]   [address]  [place] [address]  [phone] (for appointments)  Fax: [phone]  [facility] [address]  [place] [address]  Phone:  [phone]  Fax: [phone]  Hours:  Monday - Friday 8:30 - 5:00pm     [facility]  [address]  [place] [address]  [phone]     [facility]  [address]  [place] [address]  [phone]     Thanks,    Lamont Kearney MD    "

## 2019-01-03 ENCOUNTER — MEDICAL CORRESPONDENCE (OUTPATIENT)
Dept: HEALTH INFORMATION MANAGEMENT | Facility: CLINIC | Age: 24
End: 2019-01-03

## 2019-01-07 NOTE — TELEPHONE ENCOUNTER
"Called [employer] for a Norma  ID #[id number] - spoke with Mom and relayed appointment details - mailing letter as well.    \"[employer] - ERROR! This member is inactive or their eligibility has . Try your search again or contact your [facility] facility representative to request transportation.\"    Checking with  Steve Pereyra for additional assistance with verifying insurance to see if patient is eligible for medical rides.  "

## 2019-01-07 NOTE — TELEPHONE ENCOUNTER
Called [facility] to assist with scheduling with Psychiatry    [facility]  [address]  [address]  [place] [address]  Phone:  [phone]  Fax: [phone]    Appointment:  Monday January 14, 2019  Arrival Time:  12:30 pm  Provider:  Sabrina Lerma       Has requested a Norma  for your appointment    The first appointment is for Therapy Intake which has to be completed prior to seeing Psychiatry for medication management    Please bring a copy of your insurance card and photo ID    If you cannot make this appointment please call [phone] to reschedule    [facility]  [address]  [address]  [place] [address]  Phone:  [phone]  Fax: [phone]    Appointment:  Tuesday March 5, 2019  Arrival Time:  11:00 am  Provider:  Tong Nance MS, PA-C for Medication Management     A Norma  will be requested for your appointment    Please bring a copy of your insurance card and photo ID    If you cannot make this appointment please call [phone] to reschedule

## 2019-01-21 ENCOUNTER — TRANSFERRED RECORDS (OUTPATIENT)
Dept: HEALTH INFORMATION MANAGEMENT | Facility: CLINIC | Age: 24
End: 2019-01-21

## 2019-01-21 ENCOUNTER — MEDICAL CORRESPONDENCE (OUTPATIENT)
Dept: HEALTH INFORMATION MANAGEMENT | Facility: CLINIC | Age: 24
End: 2019-01-21

## 2019-01-23 ENCOUNTER — OFFICE VISIT (OUTPATIENT)
Dept: FAMILY MEDICINE | Facility: CLINIC | Age: 24
End: 2019-01-23
Payer: COMMERCIAL

## 2019-01-23 VITALS
TEMPERATURE: 97.6 F | SYSTOLIC BLOOD PRESSURE: 127 MMHG | RESPIRATION RATE: 18 BRPM | WEIGHT: 134 LBS | HEART RATE: 79 BPM | OXYGEN SATURATION: 98 % | BODY MASS INDEX: 23.89 KG/M2 | DIASTOLIC BLOOD PRESSURE: 90 MMHG

## 2019-01-23 DIAGNOSIS — G89.29 CHRONIC BILATERAL LOW BACK PAIN WITHOUT SCIATICA: ICD-10-CM

## 2019-01-23 DIAGNOSIS — F32.3 MAJOR DEPRESSIVE DISORDER, SINGLE EPISODE, SEVERE WITH PSYCHOTIC FEATURES (H): ICD-10-CM

## 2019-01-23 DIAGNOSIS — M54.50 CHRONIC BILATERAL LOW BACK PAIN WITHOUT SCIATICA: ICD-10-CM

## 2019-01-23 RX ORDER — CYCLOBENZAPRINE HCL 5 MG
5 TABLET ORAL
Qty: 42 TABLET | Refills: 0 | Status: SHIPPED | OUTPATIENT
Start: 2019-01-23 | End: 2019-08-05

## 2019-01-23 ASSESSMENT — ANXIETY QUESTIONNAIRES
IF YOU CHECKED OFF ANY PROBLEMS ON THIS QUESTIONNAIRE, HOW DIFFICULT HAVE THESE PROBLEMS MADE IT FOR YOU TO DO YOUR WORK, TAKE CARE OF THINGS AT HOME, OR GET ALONG WITH OTHER PEOPLE: VERY DIFFICULT
7. FEELING AFRAID AS IF SOMETHING AWFUL MIGHT HAPPEN: NEARLY EVERY DAY
GAD7 TOTAL SCORE: 19
3. WORRYING TOO MUCH ABOUT DIFFERENT THINGS: NEARLY EVERY DAY
2. NOT BEING ABLE TO STOP OR CONTROL WORRYING: MORE THAN HALF THE DAYS
5. BEING SO RESTLESS THAT IT IS HARD TO SIT STILL: NEARLY EVERY DAY
6. BECOMING EASILY ANNOYED OR IRRITABLE: MORE THAN HALF THE DAYS
1. FEELING NERVOUS, ANXIOUS, OR ON EDGE: NEARLY EVERY DAY

## 2019-01-23 ASSESSMENT — PATIENT HEALTH QUESTIONNAIRE - PHQ9
5. POOR APPETITE OR OVEREATING: NEARLY EVERY DAY
SUM OF ALL RESPONSES TO PHQ QUESTIONS 1-9: 21

## 2019-01-23 NOTE — PATIENT INSTRUCTIONS
1/23/2019  Mira Segundo    It was a pleasure to see you today at [facility].     My recommendations after this visit include:   1. 2 tablets (20mg Prozac pills) to equal 40mg  2. Return in 2 weeks with Dr. Bach  3. Keep appointment with Mental health    Thank you for allowing me to be a part of your health care team!    Sincerely,   Dr. Toth

## 2019-01-23 NOTE — PROGRESS NOTES
"S: Mira Segundo is a 23 year old male with a PMH of   Patient Active Problem List   Diagnosis     Adjustment disorder with mixed anxiety and depressed mood     Chronic motor or vocal tic disorder     Dyshidrosis     Mild intermittent asthma     Beta-Thalassemia Trait     Major depressive disorder, single episode, severe with psychotic features (H)     presenting to clinic today with a chief complaint of following up for depression and anxiety, difficulty with sleep and body aching which is not improving. Feels this is because of depression and is feeing anxious and paranoid. Feels like someone is following him and that something bad is happening.This is not new or unchanged.  Reports some benefit with seroquel also some benefit for sleep. He does sometimes feel like he does not want to do \"this\" anymore. No plan. He reports he does have support including his aunt and therapy. He has appointment 2/7/19 with therapist.     PHQ-9 SCORE 11/14/2018 12/19/2018 1/23/2019   PHQ-9 Total Score - - -   PHQ-9 Total Score 18 17 21     ROS:  Gen: increased fatigue.   Psych: No SI/HI, no VH. Reports hearing footsteps that others do not.  Increased energy.     Current Outpatient Medications   Medication Sig Dispense Refill     albuterol (PROAIR HFA/PROVENTIL HFA/VENTOLIN HFA) 108 (90 Base) MCG/ACT inhaler Inhale 2 puffs into the lungs every 6 hours as needed for shortness of breath / dyspnea 2 Inhaler 3     cyclobenzaprine (FLEXERIL) 5 MG tablet Take 1 tablet (5 mg) by mouth nightly as needed for muscle spasms 42 tablet 0     FLUoxetine (PROZAC) 20 MG capsule Take 2 capsules (40 mg) by mouth daily 90 capsule 1     fluticasone (ARNUITY ELLIPTA) 100 MCG/ACT inhaler Inhale 1 puff into the lungs daily 2 each 3     melatonin 3 MG tablet Take 2 tablets (6 mg) by mouth nightly as needed for sleep 90 tablet 3     QUEtiapine (SEROQUEL) 300 MG tablet Take 50 mg daily at bedtime on days 1 and 2, increase to 150 mg daily at bedtime thereafter 90 " tablet 1     ibuprofen (IBU) 600 MG tablet Take 1 tablet (600 mg) by mouth every 6 hours as needed for pain 60 tablet 0       O: /90   Pulse 79   Temp 97.6  F (36.4  C) (Oral)   Resp 18   Wt 60.8 kg (134 lb)   SpO2 98%   BMI 23.89 kg/m     Gen:  Well nourished and in No acute distress.   Psych: Flat affect, quiet voice. Not paranoid appearing. Responding appropriately to questions. Slow speech and movement.      Assessment and Plan:  Lo was seen today for recheck and musculoskeletal problem.    Diagnoses and all orders for this visit:    Major depressive disorder, single episode, severe with psychotic features (H)  -     FLUoxetine (PROZAC) 20 MG capsule; Take 2 capsules (40 mg) by mouth daily  -      : Sign Language or Oral - 38-52 minutes  Comment: discussed increasing fluoxetine to higher dose and therapeutic range. May consider monitoring for michelle due to some increased energy. Also may consider duloxetine due to body aches. Plan for close follow up. Supports in place. No red flag and no suicidal plan. Will monitor for psychosis or delusions.     Chronic bilateral low back pain without sciatica  -     cyclobenzaprine (FLEXERIL) 5 MG tablet; Take 1 tablet (5 mg) by mouth nightly as needed for muscle spasms      This patient was seen and discussed with Dr. Mercado who agrees with the assessment and plan.     Rosa Toth, DO  PGY3

## 2019-01-23 NOTE — NURSING NOTE
Due to patient being non-English speaking/uses sign language, an  was used for this visit. Only for face-to-face interpretation by an external agency, date and length of interpretation can be found on the scanned worksheet.       name: Steve Pereyra (Htoo)  Language: Norma  Agency:  [facility]  Phone number: [phone]  Type of interpretation:  Face-to-face, spoken

## 2019-01-24 DIAGNOSIS — R68.84 JAW PAIN: ICD-10-CM

## 2019-01-24 RX ORDER — IBUPROFEN 600 MG/1
600 TABLET, FILM COATED ORAL EVERY 6 HOURS PRN
Qty: 60 TABLET | Refills: 0 | Status: SHIPPED | OUTPATIENT
Start: 2019-01-24 | End: 2020-05-27

## 2019-01-24 ASSESSMENT — ANXIETY QUESTIONNAIRES: GAD7 TOTAL SCORE: 19

## 2019-02-07 ENCOUNTER — DOCUMENTATION ONLY (OUTPATIENT)
Dept: PSYCHOLOGY | Facility: CLINIC | Age: 24
End: 2019-02-07

## 2019-02-07 ENCOUNTER — OFFICE VISIT (OUTPATIENT)
Dept: FAMILY MEDICINE | Facility: CLINIC | Age: 24
End: 2019-02-07
Payer: COMMERCIAL

## 2019-02-07 VITALS
HEIGHT: 63 IN | BODY MASS INDEX: 24.52 KG/M2 | DIASTOLIC BLOOD PRESSURE: 87 MMHG | HEART RATE: 81 BPM | OXYGEN SATURATION: 98 % | WEIGHT: 138.4 LBS | SYSTOLIC BLOOD PRESSURE: 129 MMHG | RESPIRATION RATE: 20 BRPM | TEMPERATURE: 98.3 F

## 2019-02-07 DIAGNOSIS — F32.3 MAJOR DEPRESSIVE DISORDER, SINGLE EPISODE, SEVERE WITH PSYCHOTIC FEATURES (H): Primary | ICD-10-CM

## 2019-02-07 DIAGNOSIS — R45.851 SUICIDAL IDEATION: ICD-10-CM

## 2019-02-07 ASSESSMENT — ANXIETY QUESTIONNAIRES
5. BEING SO RESTLESS THAT IT IS HARD TO SIT STILL: NEARLY EVERY DAY
6. BECOMING EASILY ANNOYED OR IRRITABLE: NEARLY EVERY DAY
GAD7 TOTAL SCORE: 19
2. NOT BEING ABLE TO STOP OR CONTROL WORRYING: NEARLY EVERY DAY
1. FEELING NERVOUS, ANXIOUS, OR ON EDGE: MORE THAN HALF THE DAYS
IF YOU CHECKED OFF ANY PROBLEMS ON THIS QUESTIONNAIRE, HOW DIFFICULT HAVE THESE PROBLEMS MADE IT FOR YOU TO DO YOUR WORK, TAKE CARE OF THINGS AT HOME, OR GET ALONG WITH OTHER PEOPLE: VERY DIFFICULT
3. WORRYING TOO MUCH ABOUT DIFFERENT THINGS: MORE THAN HALF THE DAYS
7. FEELING AFRAID AS IF SOMETHING AWFUL MIGHT HAPPEN: NEARLY EVERY DAY

## 2019-02-07 ASSESSMENT — PATIENT HEALTH QUESTIONNAIRE - PHQ9
SUM OF ALL RESPONSES TO PHQ QUESTIONS 1-9: 24
5. POOR APPETITE OR OVEREATING: NEARLY EVERY DAY

## 2019-02-07 ASSESSMENT — MIFFLIN-ST. JEOR: SCORE: 1519.03

## 2019-02-07 NOTE — NURSING NOTE
Chief Complaint   Patient presents with     RECHECK     MOOD DISORDER F/U      Kofi Reich CMA    Due to patient being non-English speaking/uses sign language, an  was used for this visit. Only for face-to-face interpretation by an external agency, date and length of interpretation can be found on the scanned worksheet.     name: NIKHIL BRITTON  Agency: [employer]  Language: Norma   Telephone number: [phone]  Type of interpretation: Group, spoken; number of participants: 2     Kofi Reich CMA

## 2019-02-07 NOTE — NURSING NOTE
Depression Response ([facility])    Question 9 on the PHQ-9 was positive for suicidality? Yes    I personally notified the following: Provider and Preceptor    Action(s) taken: A coworker (PCS or RN) stayed in room with patient while I notified provider.     Kofi Reich, [facility]

## 2019-02-07 NOTE — PROGRESS NOTES
"       SUBJECTIVE       Mira Segundo is a 24 year old  male with a PMH significant for:     Patient Active Problem List   Diagnosis     Adjustment disorder with mixed anxiety and depressed mood     Chronic motor or vocal tic disorder     Dyshidrosis     Mild intermittent asthma     Beta-Thalassemia Trait     Major depressive disorder, single episode, severe with psychotic features (H)     He presents in follow-up for major depression. Today states that since his last visit, symptoms have worsened significantly. He has been having visual hallucinations of people trying to hurt him, and auditory hallucinations of voices saying \"I'm going to kill you.\" He is feeling very paranoid. States that he has been sleeping very little, not at all last night. Also reports thoughts of hurting himself. He has been thinking about using a knife in his kitchen to end his life. He does not feel safe being discharged home at this time. States that he has continued to take his fluoxetine and seroquel as prescribed. He is agreeable to transport to the ED for likely psychiatric admission.     His father is with him today and is also in agreement with this plan. States that the family has been very worried about the patient and have worried that he will hurt himself.      PMH, Medications and Allergies were reviewed and updated as needed.        REVIEW OF SYSTEMS     See HPI        OBJECTIVE     Vitals:    02/07/19 1055   BP: 129/87   Pulse: 81   Resp: 20   Temp: 98.3  F (36.8  C)   TempSrc: Oral   SpO2: 98%   Weight: 62.8 kg (138 lb 6.4 oz)   Height: 1.61 m (5' 3.39\")     Body mass index is 24.22 kg/m .    Gen: Pale adult male. Alert and appropriate. No acute distress  CV: Appears well perfused  Pulm: Breathing comfortably on room air  MENTAL STATUS EXAM  Appearance: appropriate  Attitude: cooperative  Behavior: normal  Eye Contact: poor  Speech: slow, quiet  Orientation: oriented to person , place, time and situation  Mood: depressed, " paranoid  Affect: Mood Congruent  Thought Process: clear  Suicidal Ideation: endorses, cannot contract for safety  Hallucination: auditory and visual hallucinations        No results found for this or any previous visit (from the past 24 hour(s)).      ASSESSMENT AND PLAN     1. Major depressive disorder, single episode, severe with psychotic features (H)  2. Suicidal ideation  Worsening symptoms of depression, paranoia, now with prominent auditory and visual hallucinations. Patient endorses persistent suicidal thoughts with plan. Cannot contract for safety. He is agreeable to transport to [facility] ED today for further evaluation and likely admission. [facility]'s Mental Health Crisis Plan initiated. EMS was called and patient transported via ambulance. Patient's father traveled with him in the ambulance. Paperwork sent with him per protocol. Sign-out completed with Dr. Da Silva at [facility] ED.     Kenna Bach I precepted today with Felice Mercado MD.

## 2019-02-07 NOTE — PROGRESS NOTES
Primary Care Behavioral Health Consult Note    Requesting Provider: Dr. Bach    Identifying Information and Presenting Problem:    Dr. Bach requested behavioral health consultation for this patient regarding input on safety assessment and planning.      Summary of review: [facility] when critical vital for PHQ9 was identified during rooming process for patient.  While Dr. Bach is familiar with this patient who has a history of chronic, passive suicidal ideation, current PHQ9 seems to indicate escalation of symptoms today including more active suicidal ideation.  Discussed plans to assess safety and tools to support his assessment and Dr. Bach agreed to complete this assessment.  Recommended use of BHSUICIDEASSESS smartphrase to guide assessment process.  Discussed options to mediate risk should hospitalization not be required today.    Assessment: Did not meet with or assess patient personally today.  See below for current problem list:    Patient Active Problem List   Diagnosis     Adjustment disorder with mixed anxiety and depressed mood     Chronic motor or vocal tic disorder     Dyshidrosis     Mild intermittent asthma     Beta-Thalassemia Trait     Major depressive disorder, single episode, severe with psychotic features (H)       PHQ-9 SCORE 12/19/2018 1/23/2019 2/7/2019   PHQ-9 Total Score - - -   PHQ-9 Total Score 17 21 24       ROSELYN-7 SCORE 12/19/2018 1/23/2019 2/7/2019   Total Score 15 19 19       Recommendations and Plan:  After meeting with patient and providing further assessment, Dr. Bach determined that patient required hospitalization.  Provided orientation to our clinic protocol for transporting a patient in psychiatric crisis and additional support in enlisting clinic team members for help with this process.  See Dr. Bach's note from today for additional detail regarding assessment and follow up plan from today.  I do see that we scheduled follow  up with the patient in one week with Dr. Kearney (as per our clinic protocol).  Will yellow dot that visit so that  can be aware of potential need for consultation and follow up at that visit.  Will include Dr. Palma in the routing of this note as I believe she will be covering [facility] that day.    I do believe that we got an SHANTAL for mental health providers at [facility] at today's visit (per conversation with Rosemarie) and think it would likely be a good idea for us to do an outreach call to provider Sabrina Lerma there so they can be aware.  Has future visit with Tong Nance, MS, PA-C for med management, but not until 3/5/19.  Will see if perhaps Mari would be able to make this outreach call as I do not think that Dr. Bach is back in clinic until next week.    Alexandria Rivas, PhD,     Disclaimer  The above treatment recommendations are based on consultation with the patient's primary care provider and a review of relevant information in EPIC.  I have not personally examined the patient.  All recommendations should be implemented with considerations of the patient's relevant prior history and current clinical status.  Please contact me with any questions about the care of this patient.

## 2019-02-08 ASSESSMENT — ANXIETY QUESTIONNAIRES: GAD7 TOTAL SCORE: 19

## 2019-02-08 ASSESSMENT — ASTHMA QUESTIONNAIRES: ACT_TOTALSCORE: 19

## 2019-02-08 NOTE — PROGRESS NOTES
JUAN M called [facility] to f/u on disposition from ED transport. Patient has been admitted at [facility] on [facility] Behavioral Health Unit.       JUAN M called [facility]. Left a message for Sabrina Lerma MA, KAJAL and informed her patient was admitted for  at [facility].     Updated cares teams with Sabrina's information and Tong Andrade PA-C as he has an upcoming appointment with him for psychiatry.     Routing to  team and .     EMILIANO Jasmine

## 2019-02-13 ENCOUNTER — DOCUMENTATION ONLY (OUTPATIENT)
Dept: PSYCHOLOGY | Facility: CLINIC | Age: 24
End: 2019-02-13

## 2019-02-13 NOTE — PROGRESS NOTES
JUAN M called [facility] 7  Unit. Staff there indicate that Lo will be discharged tomorrow, 02/14/19, in the morning. They are aware of his clinic at [facility] tomorrow, 02/14/19, at 1:50pm with .    JUAN M reviewed [facility] Court Scheduled, which is public record under the [place] Courts website. Patient is listed for a court hearing on Friday, February 15th at 1:15pm. The location of his court hearing is listed as '[place] [id number]'. The hearing is listed as an omnibus hearing, which is pretrial. The  listed is Jay Gomez.     JUAN M will make a note to meet with patient during his visit tomorrow to provide the date, time, and location for the court hearing.     EMILIANO Jasmine

## 2019-02-13 NOTE — PROGRESS NOTES
SW completed another f/u call to [facility] to determine if patient will be discharged in time for his PCP appointment at [facility], 02/14/19 at 1:50pm. Spoke with staff on [address] 7  Unit at [facility] who report that patient is set to discharge tomorrow morning, 02/14/19. They are aware that he has an appointment with [facility].     Routing to  and  for FYI.     EMILIANO Jasmine     oral
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
## 2019-02-14 NOTE — PROGRESS NOTES
Patient's appointment with  was cancelled for this afternoon.     SW called [facility] and they indicate they called to cancel as patient's discharge was pushed back. Patient is still discharging today, however, not until later in the afternoon.     JUAN M offered a reschedule for [facility]. He is scheduled for 02/18/19 at 3:30pm with .     SW will call patient tomorrow morning, at 8:30am in attempts to notify him of the details of his court hearing as it is unclear if patient has all of those details.     Mari Bruno, EMILIANO

## 2019-02-15 NOTE — PROGRESS NOTES
JUAN M utilized [facility]  to reach out to patient. He did not answer. SW did not leave a VM.     No further outreach attempts will be made at this time. JUAN M will provide supports, as needed, to patient during his next clinic visit which is scheduled for Monday, February 18th with .     EMILIANO Jasmine

## 2019-02-15 NOTE — PROGRESS NOTES
JUAN M called [facility] Admin and verified the location of the court hearing for patient today is at the [facility]:    [address] [place] [address]  Court Room [address]102    JUAN M utilized [employer] [facility]  to reach out to patient. He did not answer at the primary phone number. JUAN M called emergency contact, father, he states that Lo is not available as he is still hospitalized.    JUAN M called [facility]. They confirm patient was discharged yesterday afternoon, 02/14/19. JUAN M will attempt outreach to Lo again at 11:30am.     EMILIANO Jasmine

## 2019-02-19 NOTE — PROGRESS NOTES
JUAN M utilized [employer] [facility] to reach out to patient. SW was able to connect with Lo. He states that he is still in the hospital. SW attempted to clarify what hospital, patient stated he did not know the name of the place but that it is located in [facility]. He states 'it's a big building', possibly called something like '[facility]'.    SW reminded patient of his 02/21/19 appointment with , he states he is aware and plans to attend the visit. He states the workers at the hospital he is at will help him with transportation to get to the appointment at [facility].    SW did encourage Lo to reach out to his family as they were worried about him. He did not respond to that message from the SW and indicated he would come to the clinic visit.    SW called [facility] in order to clarify what their disposition plan was from the 02/14/19 discharge. They indicate he was discharged to [facility], an crisis residence managed by [employer]:    [facility]  [employer]  [address] [place] [address]    Phone: [phone]   Fax: [phone]    JUAN M added note to snapshots to gather SHANTAL for [facility] at the time of the visit. It is unclear how long he will stay there but most crisis residences' operate on a 1-10 day stay with most people staying between 3-5 days. It is not long term in nature.     Mari Bruno, EMILIANO

## 2019-02-19 NOTE — PROGRESS NOTES
Today, Dr. Rodriguez, who cares for Mira Segundo's mother shared that family is concerned as they have not had contact with Mira Segundo.  Family suspects he may still be in the hospital, but they are not sure.  Apparently, police came to the house with his picture yesterday, but he was not there and they were not clear on what police wanted as they did not have an  with them.  Agreed I would review Mria Segundo's chart for information on whether he had been discharged from hospital or not, but noted that we were limited about what we could share given lack of SHANTAL for communication with family.  Agreed we would place outreach call to patient if possible to let him know that family was concerned and to ask that he contact them if possible so they could be reassured that he was Ok.     Upon review of chart after discussion with Dr. Rodriguez, he appears to have been discharged on 2/14/19.  From Mari's note below it looks as though we have had difficulty reaching him and may not be able to relay the request to contact family.  Will still route this request to Mari to see if she would be willing to place an additional outreach call.  It is unclear to me if number listed in chart is separate phone for Mira Segundo vs. Number for family as he had been living with family up until 3-4 months ago when he was asked to leave the home and may have become homeless.  Will add recommendation that we obtain SHANTAL for family at next opportunity to facilitate future communication.    Court date was for 2/15/19, but it is unclear to me if Mira Segundo was aware of this court date or if he had the means to attend (transportation, address, etc.).  This could have been why police were looking for him.  Per family, he does have a  through [employer] and we should try to get SHANTAL for this provider if possible at next visit.    Mira Segundo is scheduled to see Dr. Bach on Thursday, 2/21 at 2:10pm.  I have yellow dotted this visit and will route note from today to   Danae who will be covering [facility] that afternoon so she can provide additional behavioral health support as needed to the patient and family.  Will route this note to both Dr. Bach and Dr. Fink so they can be aware of conversation and plan from today.    Alexandria Rivas, PhD, LP

## 2019-02-21 NOTE — PROGRESS NOTES
Hey team,    Just wanted to update that this patient's appointment was cancelled for today. It looks like he was rescheduled for another appointment today, which was then also cancelled. He does not appear he was rescheduled for a future appointment.    Arcelia Fink, Ph.D.,   Behavioral Health Fellow

## 2019-02-21 NOTE — PROGRESS NOTES
SW called [facility] to request patient's discharge records. The [facility] floor staff that answered indicated the request would be sent to the health unit coordinator, who is currently in a meeting. She will have them call this SW back to complete the request. Should be later this morning yet.     EMILIANO Jasmine

## 2019-02-22 NOTE — PROGRESS NOTES
Thanks for the update Lance Guerra - could you place outreach call to patient to see how he is doing and see if he would be able to reschedule?  I'm wondering what barriers he may be encountering and if we could help with this.  Also, if he is still at the crisis residence, I wonder if we could call there to see what supports may be available to support follow up.    Let me know if any further follow up from me would be helpful.  Alexandria Rivas, PhD, LP

## 2019-02-28 ENCOUNTER — DOCUMENTATION ONLY (OUTPATIENT)
Dept: FAMILY MEDICINE | Facility: CLINIC | Age: 24
End: 2019-02-28
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
## 2019-03-04 NOTE — PROGRESS NOTES
[facility] to reaching out to family. Patient is no longer listed on list.     SW did attempt outreach to patient, with Norma  via [employer]. He did not answer. Did not leave a VM.     SW will attempt outreach to family, tomorrow, to inquire if they have connected with Lo.    EMILIANO Jasmine

## 2019-03-07 NOTE — PROGRESS NOTES
SW utilized [employer] [facility]  and completed outreach to patient's parents. Spoke with patient's father, Vishnu. He indicates that he heard from Lo about 3 days ago and he was in 'the long term'. He states he has a court hearing set for today, 03/07/19.     JUAN M asked Vishnu to have Lo reach out to [facility] if he does return home or to the community. Vishnu indicated he would do so and appreciated the phone call from the clinic.     JUAN M did verify in the [facility] Court Record Calendar (public record) that patient is scheduled for court on 03/07/19 at 1:20pm at the [facility] Courtroom [address]. It lists the hearing type as omnibus (pretrial) and lists the patient as the defendant.     JUAN M will not continue f/u, at this time, as patient appears to be currently incarcerated.     EMILIANO Jasmine

## 2019-07-24 ENCOUNTER — TRANSFERRED RECORDS (OUTPATIENT)
Dept: HEALTH INFORMATION MANAGEMENT | Facility: CLINIC | Age: 24
End: 2019-07-24

## 2019-08-05 ENCOUNTER — TELEPHONE (OUTPATIENT)
Dept: FAMILY MEDICINE | Facility: CLINIC | Age: 24
End: 2019-08-05

## 2019-08-05 ENCOUNTER — OFFICE VISIT (OUTPATIENT)
Dept: FAMILY MEDICINE | Facility: CLINIC | Age: 24
End: 2019-08-05
Payer: COMMERCIAL

## 2019-08-05 VITALS
DIASTOLIC BLOOD PRESSURE: 78 MMHG | RESPIRATION RATE: 16 BRPM | WEIGHT: 131.8 LBS | TEMPERATURE: 97.4 F | SYSTOLIC BLOOD PRESSURE: 116 MMHG | BODY MASS INDEX: 23.06 KG/M2 | HEART RATE: 87 BPM | OXYGEN SATURATION: 100 %

## 2019-08-05 DIAGNOSIS — F32.3 MAJOR DEPRESSIVE DISORDER, SINGLE EPISODE, SEVERE WITH PSYCHOTIC FEATURES (H): ICD-10-CM

## 2019-08-05 DIAGNOSIS — J30.2 SEASONAL ALLERGIC RHINITIS, UNSPECIFIED TRIGGER: ICD-10-CM

## 2019-08-05 DIAGNOSIS — J45.41 MODERATE PERSISTENT ASTHMA WITH ACUTE EXACERBATION: ICD-10-CM

## 2019-08-05 RX ORDER — CETIRIZINE HYDROCHLORIDE 10 MG/1
10 TABLET ORAL DAILY
Qty: 30 TABLET | Refills: 0 | Status: SHIPPED | OUTPATIENT
Start: 2019-08-05 | End: 2019-09-10

## 2019-08-05 RX ORDER — QUETIAPINE FUMARATE 300 MG/1
TABLET, FILM COATED ORAL
Qty: 90 TABLET | Refills: 1 | Status: SHIPPED | OUTPATIENT
Start: 2019-08-05 | End: 2019-08-05

## 2019-08-05 RX ORDER — QUETIAPINE FUMARATE 300 MG/1
TABLET, FILM COATED ORAL
Qty: 90 TABLET | Refills: 1 | Status: SHIPPED | OUTPATIENT
Start: 2019-08-05 | End: 2019-08-06

## 2019-08-05 RX ORDER — QUETIAPINE FUMARATE 25 MG/1
50 TABLET, FILM COATED ORAL EVERY MORNING
Qty: 30 TABLET | Refills: 0 | Status: SHIPPED | OUTPATIENT
Start: 2019-08-05 | End: 2019-08-12

## 2019-08-05 RX ORDER — ALBUTEROL SULFATE 90 UG/1
2 AEROSOL, METERED RESPIRATORY (INHALATION) EVERY 6 HOURS PRN
Qty: 2 INHALER | Refills: 3 | Status: SHIPPED | OUTPATIENT
Start: 2019-08-05 | End: 2020-07-17

## 2019-08-05 NOTE — NURSING NOTE
Due to patient being non-English speaking/uses sign language, an  was used for this visit. Only for face-to-face interpretation by an external agency, date and length of interpretation can be found on the scanned worksheet.       name: Steve Pereyra (Htoo)  Language: Norma  Agency:  [employer]  Phone number: [phone]  Type of interpretation:  Face-to-face, spoken

## 2019-08-05 NOTE — PATIENT INSTRUCTIONS
Thank you for coming to [facility] clinic for your care. It was a pleasure to take care of you!    - Great job on taking good care of your health, KEEP IT UP!  - Start taking 300 mg of Seroquel at night and 50 mg in the morning  - Start taking Zyrtec 10 mg nightly.   - Stop taking Fluoxetine or prozac  - Plan to follow up in one week.     Cecy Jerry MD

## 2019-08-05 NOTE — PROGRESS NOTES
Preceptor Attestation:   Patient seen, evaluated and discussed with the resident. I have verified the content of the note, which accurately reflects my assessment of the patient and the plan of care.   Supervising Physician:  Allen Marks MD

## 2019-08-05 NOTE — TELEPHONE ENCOUNTER
[facility] phone call message- medication clarification/question:    Full Medication Name:     QUEtiapine (SEROQUEL) 300 MG tablet    Dose:     Take 50 mg daily at bedtime on days 1 and 2, increase to 150 mg daily at bedtime thereafter    Question:     Please call to clarify dosage and tablet.    Pharmacy confirmed as    [facility]   Yes    OK to leave a message on voice mail? Yes    Primary language: Norma      needed? Yes    Call taken on August 5, 2019 at 10:42 AM by Sujatha Woodruff

## 2019-08-05 NOTE — PROGRESS NOTES
SUBJECTIVE       Mira Segundo is a 24 year old male with a PMH significant for   Patient Active Problem List   Diagnosis     Adjustment disorder with mixed anxiety and depressed mood     Chronic motor or vocal tic disorder     Dyshidrosis     Mild intermittent asthma     Beta-Thalassemia Trait     Major depressive disorder, single episode, severe with psychotic features (H)    who presents for evaluation of sleep issues and back pain.    Sleep issues  Patient reports that his sleep issues started about 6 years ago.  Patient reports prior history of depression and insomnia.  Has been in prison for the past 5 months and recently got released about 3 weeks ago.  Patient reports that while in prison, he was getting his fluoxetine as well as his Seroquel consistently however whenever he missed it, he noticed that he was unable to sleep and had some racing thoughts.  Patient is today endorses symptoms of michelle including inability to sleep for a week at a time, increased energy, grandiose thoughts and ideas including lines of build a house/car, and risky behavior.  Patient admits to having taken marijuana in the past and drinking alcohol to avoid unhealthy extent but reports that he is not used any of these since being released from prison.    PHQ 9 today is 12 pipe 7 is 14.  Patient reports today that his biggest issue has been a sleep and feels like this is secondary to having 9 out of his medications.  He reports that his mood is affected by discretely.  Patient denies any recent drug use, denies any family history of bipolar schizophrenia.  Patient denies any auditory hallucinations today however has had a history of this in the past.  He denies any HI or SI.    Patient speaks Norma, so an  was used.    Medications and problem list have been reviewed and updated.         REVIEW OF SYSTEMS     General: No fevers, chills, positive for sleeplessness.  Head: No headache, dizziness  Neck: No swallowing problems   Resp:  No cough.  Positive for nasal congestion.  GI: No constipation, diarrhea, no nausea or vomiting  Skin: No rash        OBJECTIVE     Vitals:    08/05/19 0909   BP: 116/78   BP Location: Left arm   Patient Position: Sitting   Cuff Size: Adult Regular   Pulse: 87   Resp: 16   Temp: 97.4  F (36.3  C)   TempSrc: Oral   SpO2: 100%   Weight: 59.8 kg (131 lb 12.8 oz)     Body mass index is 23.06 kg/m .  Gen: Young Norma male sitting up in chair, in no apparent distress.  HEENT: No Scleral icterus or conjunctival injection  Neck: supple without lymphadenopathy  CV:  RRR  - no murmurs, age appropriate rate  Pulm:  CTAB, no wheezes/rales/rhonchi, good air entry   Abdomen: soft, nontender, no masses, no rebound, BS intact throughout  Skin: No rashes or lesions noted.  Psych: Appears slightly anxious however otherwise appropriate.  Answers questions appropriately, maintains eye contact.  Mood congruent.    ASSESSMENT AND PLAN     Lo was seen today for sleep problem and medication reconciliation.    Diagnoses and all orders for this visit:    Major depressive disorder, single episode, severe with psychotic features (H)  Patient is coming in today for evaluation of sleeplessness and for medication refill.  Patient endorses symptoms of michelle including unable to sleep for 4 to 5 days at a time with increased energy.  Increased racing thoughts, grandiose ideas and rapid that was noted by family members.  Patient currently being treated for MDD with severe psychotic features and is on SSRI for this.  Suspect Seroquel has been helpful with his mood as well as preventing his michelle however patient has labile behavior.  In the setting of current symptoms, suspect that patient indeed has bipolar disorder and so will plan to discontinue his SSRI at this time.  We will plan to increase Seroquel to help with his symptoms of michelle.  We will follow-up on mood and continue to monitor for depressive symptoms.  Consider adjunct antidepressants if  this is the case.  - Plan to continue Seroquel 300 mg at night which is his current dose.  - Plan to start taking 50 mg Seroquel in the morning with plan to increase this at future visits.  - QUEtiapine (SEROQUEL) 25 MG tablet; Take 2 tablets (50 mg) by mouth every morning  - QUEtiapine (SEROQUEL) 300 MG tablet; Take 300 mg by mouth nightly  -  : Sign Language or Oral - 53-67 minutes    Moderate persistent asthma with acute exacerbation  -     albuterol (PROAIR HFA/PROVENTIL HFA/VENTOLIN HFA) 108 (90 Base) MCG/ACT inhaler; Inhale 2 puffs into the lungs every 6 hours as needed for shortness of breath / dyspnea  -     fluticasone (ARNUITY ELLIPTA) 100 MCG/ACT inhaler; Inhale 1 puff into the lungs daily    Nasal congestion  Patient endorses nasal congestion, sneezing.  Eyes likely consistent with allergic rhinitis.  This is more likely especially in the setting of his history of asthma.  Patient's reports congestion thought to be to Seroquel use.  He continues to have symptoms even though is not past few weeks.  Concurrent symptoms also so we will plan to treat for this.  - cetirizine (ZYRTEC) 10 MG tablet; Take 1 tablet (10 mg) by mouth daily    Options for treatment and/or follow-up care were reviewed with the patient who was engaged and actively involved in the decision making process and verbalized understanding of the options discussed and was satisfied with the final plan. Risks and benefits of plan were carefully reviewed.     I, Cecy Jerry, have discussed patient findings with attending physician Allen Marks MD who was agreeable with plan.     Cecy Jerry, PGY3

## 2019-08-06 RX ORDER — QUETIAPINE FUMARATE 300 MG/1
TABLET, FILM COATED ORAL
Qty: 90 TABLET | Refills: 1 | Status: SHIPPED | OUTPATIENT
Start: 2019-08-06 | End: 2020-05-08 | Stop reason: ALTCHOICE

## 2019-08-08 ENCOUNTER — TELEPHONE (OUTPATIENT)
Dept: FAMILY MEDICINE | Facility: CLINIC | Age: 24
End: 2019-08-08

## 2019-08-11 DIAGNOSIS — Z00.00 PREVENTATIVE HEALTH CARE: Primary | ICD-10-CM

## 2019-08-11 PROBLEM — J30.2 SEASONAL ALLERGIC RHINITIS: Status: ACTIVE | Noted: 2019-08-11

## 2019-08-12 ENCOUNTER — OFFICE VISIT (OUTPATIENT)
Dept: FAMILY MEDICINE | Facility: CLINIC | Age: 24
End: 2019-08-12
Payer: COMMERCIAL

## 2019-08-12 ENCOUNTER — TELEPHONE (OUTPATIENT)
Dept: FAMILY MEDICINE | Facility: CLINIC | Age: 24
End: 2019-08-12

## 2019-08-12 VITALS
DIASTOLIC BLOOD PRESSURE: 79 MMHG | BODY MASS INDEX: 23.28 KG/M2 | TEMPERATURE: 98 F | HEIGHT: 63 IN | HEART RATE: 103 BPM | OXYGEN SATURATION: 98 % | RESPIRATION RATE: 12 BRPM | WEIGHT: 131.4 LBS | SYSTOLIC BLOOD PRESSURE: 120 MMHG

## 2019-08-12 DIAGNOSIS — F32.3 MAJOR DEPRESSIVE DISORDER, SINGLE EPISODE, SEVERE WITH PSYCHOTIC FEATURES (H): ICD-10-CM

## 2019-08-12 DIAGNOSIS — Z00.00 PREVENTATIVE HEALTH CARE: Primary | ICD-10-CM

## 2019-08-12 DIAGNOSIS — J45.41 MODERATE PERSISTENT ASTHMA WITH ACUTE EXACERBATION: ICD-10-CM

## 2019-08-12 DIAGNOSIS — F30.9 MANIA (H): ICD-10-CM

## 2019-08-12 LAB
% GRANULOCYTES: 69.2 %G (ref 40–75)
BUN SERPL-MCNC: 12.4 MG/DL (ref 7–21)
CALCIUM SERPL-MCNC: 8.9 MG/DL (ref 8.5–10.1)
CHLORIDE SERPLBLD-SCNC: 103.8 MMOL/L (ref 98–110)
CO2 SERPL-SCNC: 31.7 MMOL/L (ref 20–32)
CREAT SERPL-MCNC: 1 MG/DL (ref 0.7–1.3)
GFR SERPL CREATININE-BSD FRML MDRD: >90 ML/MIN/1.7 M2
GLUCOSE SERPL-MCNC: 91.5 MG'DL (ref 70–99)
GRANULOCYTES #: 4.2 K/UL (ref 1.6–8.3)
HCT VFR BLD AUTO: 39.9 % (ref 40–53)
HEMOGLOBIN: 12.2 G/DL (ref 13.3–17.7)
HIV 1+2 AB+HIV1 P24 AG SERPL QL IA: NEGATIVE
LYMPHOCYTES # BLD AUTO: 1.4 K/UL (ref 0.8–5.3)
LYMPHOCYTES NFR BLD AUTO: 23.1 %L (ref 20–48)
MCH RBC QN AUTO: 19.3 PG (ref 26.5–35)
MCHC RBC AUTO-ENTMCNC: 30.6 G/DL (ref 32–36)
MCV RBC AUTO: 63 FL (ref 78–100)
MID #: 0.5 K/UL (ref 0–2.2)
MID %: 7.7 %M (ref 0–20)
PLATELET # BLD AUTO: 281 K/UL (ref 150–450)
POTASSIUM SERPL-SCNC: 3.6 MMOL/DL (ref 3.2–4.6)
RBC # BLD AUTO: 6.3 M/UL (ref 4.4–5.9)
SODIUM SERPL-SCNC: 140 MMOL/L (ref 132–142)
TSH SERPL DL<=0.05 MIU/L-ACNC: 0.32 UIU/ML (ref 0.3–5)
WBC # BLD AUTO: 6 K/UL (ref 4–11)

## 2019-08-12 RX ORDER — QUETIAPINE FUMARATE 25 MG/1
50 TABLET, FILM COATED ORAL EVERY MORNING
Qty: 60 TABLET | Refills: 0 | Status: SHIPPED | OUTPATIENT
Start: 2019-08-12 | End: 2020-05-08

## 2019-08-12 RX ORDER — ACETAMINOPHEN 500 MG
500-1000 TABLET ORAL EVERY 6 HOURS PRN
Qty: 60 TABLET | Refills: 1 | Status: SHIPPED | OUTPATIENT
Start: 2019-08-12 | End: 2019-10-24

## 2019-08-12 RX ORDER — LANOLIN ALCOHOL/MO/W.PET/CERES
3 CREAM (GRAM) TOPICAL
Qty: 30 TABLET | Refills: 0 | Status: SHIPPED | OUTPATIENT
Start: 2019-08-12 | End: 2019-08-12 | Stop reason: ALTCHOICE

## 2019-08-12 ASSESSMENT — ANXIETY QUESTIONNAIRES
2. NOT BEING ABLE TO STOP OR CONTROL WORRYING: MORE THAN HALF THE DAYS
1. FEELING NERVOUS, ANXIOUS, OR ON EDGE: MORE THAN HALF THE DAYS
5. BEING SO RESTLESS THAT IT IS HARD TO SIT STILL: MORE THAN HALF THE DAYS
3. WORRYING TOO MUCH ABOUT DIFFERENT THINGS: MORE THAN HALF THE DAYS
GAD7 TOTAL SCORE: 14
6. BECOMING EASILY ANNOYED OR IRRITABLE: MORE THAN HALF THE DAYS
IF YOU CHECKED OFF ANY PROBLEMS ON THIS QUESTIONNAIRE, HOW DIFFICULT HAVE THESE PROBLEMS MADE IT FOR YOU TO DO YOUR WORK, TAKE CARE OF THINGS AT HOME, OR GET ALONG WITH OTHER PEOPLE: SOMEWHAT DIFFICULT
7. FEELING AFRAID AS IF SOMETHING AWFUL MIGHT HAPPEN: MORE THAN HALF THE DAYS

## 2019-08-12 ASSESSMENT — PATIENT HEALTH QUESTIONNAIRE - PHQ9
5. POOR APPETITE OR OVEREATING: MORE THAN HALF THE DAYS
SUM OF ALL RESPONSES TO PHQ QUESTIONS 1-9: 12

## 2019-08-12 ASSESSMENT — MIFFLIN-ST. JEOR: SCORE: 1481.16

## 2019-08-12 NOTE — PATIENT INSTRUCTIONS
Thank you for coming to [facility] for your care. It was a pleasure to take care of you!    - Great job on taking good care of your health, KEEP IT UP!  - Try to cut back on your cigarette use  - Plan to continue using 300 mg of Seroquel at night and 50 mg in the morning, if you don't feel sleepy with the increase, you can go up to 75 mg.   - I will call you with the results of your test.    Cecy Jerry MD    MENTAL HEALTH REFERRAL    August 19, 2019  Mental Health referral routed to behavioral health team for recommendations. See Documentation Only encounter for more information.  Leandra Beckford

## 2019-08-12 NOTE — PROGRESS NOTES
SUBJECTIVE       Mira Segundo is a 24 year old male with a PMH significant for   Patient Active Problem List   Diagnosis     Adjustment disorder with mixed anxiety and depressed mood     Chronic motor or vocal tic disorder     Dyshidrosis     Mild intermittent asthma     Beta-Thalassemia Trait     Major depressive disorder, single episode, severe with psychotic features (H)     Seasonal allergic rhinitis    who presents for evaluation of depression and sleep issues.     Follow-up sleep issues and mood   patient coming in to follow-up from prior visit.  At that time, he had ran out of his medications and had not been getting his Seroquel.  Restarted on 300 mg of Seroquel nightly.  He was also started on 25 mg of Seroquel in the morning as there was some concern that patient could be having manic episodes.  Since that time, patient reports that he has been sleeping better about 4-5 hours nightly.  He started taking his medication and feels like this has been helpful.  Patient continues to endorse feeling of anxiety, racing thoughts and pressured speech intermittently.  He reports that family have told him that he has some of the symptoms.  Patient reports bursts of energy that lasts about a few hours especially in the morning but then he feels like he is more back to baseline in the afternoon.  He denies any risky behavior in terms of sexual activity and gambling however does report that he spends a lot of money.  He denies any SI or HI denies any hallucinations.  He continues to smoke about 3-4 cigarettes daily.  However he denies any drug or alcohol use.  He is currently going to treatment for this.  Patient is congratulated on his progress.    Smokes about 3 - 4 cigarettes a day.     PHQ-9 SCORE 1/23/2019 2/7/2019 8/12/2019   PHQ-9 Total Score - - -   PHQ-9 Total Score 21 24 12     Patient speaks Norma, so an  was used.    Medications and problem list have been reviewed and updated.         REVIEW OF  "SYSTEMS     General: No fevers, chills  Head: No headache, dizziness  Neck: No swallowing problems   Resp: No cough. No congestion  GI: No constipation, diarrhea, no nausea or vomiting  Skin: No rash        OBJECTIVE     Vitals:    08/12/19 1418   BP: 120/79   BP Location: Left arm   Patient Position: Sitting   Cuff Size: Adult Regular   Pulse: 103   Resp: 12   Temp: 98  F (36.7  C)   TempSrc: Oral   SpO2: 98%   Weight: 59.6 kg (131 lb 6.4 oz)   Height: 1.6 m (5' 3\")     Body mass index is 23.28 kg/m .  Gen: Young Norma male sitting up in chair, in no apparent distress.  HEENT: No Scleral icterus or conjunctival injection  Neck: supple without lymphadenopathy  CV:  RRR  - no murmurs, age appropriate rate  Pulm:  CTAB, no wheezes/rales/rhonchi, good air entry   Abdomen: soft, nontender, no masses, no rebound, BS intact throughout  Skin: No rashes or lesions noted.  Psych: Appears slightly anxious however otherwise appropriate.  Answers questions appropriately, maintains eye contact.  Mood congruent.    ASSESSMENT AND PLAN     Lo was seen today for back pain, sleep problem and refill request.    Diagnoses and all orders for this visit:    Major depressive disorder, single episode, severe with psychotic features (H)  Concern for bipolar disorder  Patient coming in today for follow-up of mood.  Continues to endorse symptoms most likely consistent with michelle with pressured speech, racing thoughts, decreased sleep and high energy.  Does have waxing and waning symptoms throughout the day however which is a little confusing.  Patient reports that he has been seeing a therapist at [facility] however we would need better clarification of his diagnosis.  We will plan to put in for a referral with Dr. Carter here at [place].  In the meantime, we will continue Seroquel at 300 mg nightly and plan to increase morning dose to 50 mg. Unfortunately patient left without signing SHANTAL for [facility].   -     QUEtiapine (SEROQUEL) 25 MG " tablet; Take 2 tablets (50 mg) by mouth every morning    Preventative health care  Patient is due for preventative health exam.  We will plan to draw labs today and have him schedule appointment for this.  -     Thyroid [place] ([facility])  -     HIV Ag/Ab Screen [place] ([facility])  -     CBC with Diff Plt ([facility])  -     Basic Metabolic Panel ([facility])  - Results < 1 hr  -     acetaminophen (TYLENOL) 500 MG tablet; Take 1-2 tablets (500-1,000 mg) by mouth every 6 hours as needed for mild pain  -      : Sign Language or Oral - 53-67 minutes    Moderate persistent asthma with acute exacerbation  -     fluticasone (ARNUITY ELLIPTA) 100 MCG/ACT inhaler; Inhale 1 puff into the lungs daily    Options for treatment and/or follow-up care were reviewed with the patient who was engaged and actively involved in the decision making process and verbalized understanding of the options discussed and was satisfied with the final plan. Risks and benefits of plan were carefully reviewed.     I, Cecy Jerry, have discussed patient findings with attending physician Allen Marks MD who was agreeable with plan.     Cecy Jerry, PGY3

## 2019-08-12 NOTE — LETTER
August 19, 2019      Mira Segundo  [address]  [place] [address]        Dear Mira,    I hope you're well. I wanted to communicate with you the results of the tests that we did.   - Your electrolyte testing was normal   - Your thyroid function was normal   - HIV testing was negative   - Your blood level showed that you have a low hemoglobin [low blood levels].  But this has been stable when compared to the past.   Please see below for your test results.    Resulted Orders   Thyroid [place] ([facility])   Result Value Ref Range    TSH 0.32 0.30 - 5.00 uIU/mL    Narrative    Test performed by:  [facility] [address] [place] [address]   HIV Ag/Ab Screen [place] ([facility])   Result Value Ref Range    HIV Antigen/Antibody Negative Negative    Narrative    Test performed by:  [facility] [address] [place] [address]  Method is Abbott HIV Ag/Ab for the detection of HIV p24 antigen, HIV-1   antibodies and HIV-2 antibodies.   CBC with Diff Plt ([facility])   Result Value Ref Range    WBC 6.0 4.0 - 11.0 K/uL    Lymphocytes # 1.4 0.8 - 5.3 K/uL    % Lymphocytes 23.1 20.0 - 48.0 %L    Mid # 0.5 0.0 - 2.2 K/uL    Mid % 7.7 0.0 - 20.0 %M    GRANULOCYTES # 4.2 1.6 - 8.3 K/uL    % Granulocytes 69.2 40.0 - 75.0 %G    RBC 6.3 (H) 4.4 - 5.9 M/uL    Hemoglobin 12.2 (L) 13.3 - 17.7 g/dL    Hematocrit 39.9 (L) 40.0 - 53.0 %    MCV 63.0 (L) 78.0 - 100.0 fL    MCH 19.3 (L) 26.5 - 35.0    MCHC 30.6 (L) 32.0 - 36.0 g/dL    Platelets 281.0 150.0 - 450.0 K/uL   Basic Metabolic Panel ([facility])  - Results < 1 hr   Result Value Ref Range    Urea Nitrogen 12.4 7.0 - 21.0 mg/dL    Calcium 8.9 8.5 - 10.1 mg/dL    Chloride 103.8 98.0 - 110.0 mmol/L    Carbon Dioxide 31.7 20.0 - 32.0 mmol/L    Creatinine 1.0 0.7 - 1.3 mg/dL    Glucose 91.5 70.0 - 99.0 mg'dL    Potassium 3.6 3.2 - 4.6 mmol/dL    Sodium 140.0 132.0 - 142.0 mmol/L    GFR Estimate >90 >60.0 mL/min/1.7 m2    GFR Estimate If Black >90 >60.0 mL/min/1.7 m2        If you have any questions, please call the clinic to make an appointment.    Sincerely,    Cecy Jerry MD

## 2019-08-12 NOTE — NURSING NOTE
Due to patient being non-English speaking/uses sign language, an  was used for this visit. Only for face-to-face interpretation by an external agency, date and length of interpretation can be found on the scanned worksheet.       name: Steve Pereyra (Htoo)  Language: Norma  Agency:  [employer]  Phone number: [phone]  Type of interpretation:  Face-to-face, spoken

## 2019-08-13 ASSESSMENT — ANXIETY QUESTIONNAIRES: GAD7 TOTAL SCORE: 14

## 2019-08-19 ENCOUNTER — DOCUMENTATION ONLY (OUTPATIENT)
Dept: FAMILY MEDICINE | Facility: CLINIC | Age: 24
End: 2019-08-19

## 2019-08-19 NOTE — PROGRESS NOTES
Behavioral Health Team,    Patient is being referred for mental health services by their provider, Dr. Jerry.  Please advise if we are able to see patient for in house treatment or if a community option would be best.    Thank you,    Leandra Beckford  8/19/2019

## 2019-08-20 NOTE — PROGRESS NOTES
The SHANTAL for [facility] was faxed 8/13/19  The SHANTAL for [facility] was faxed 2/7/19 but I did not see any paperwork from them so I re-faxed the SHANTAL 8/20/19.  /[facility]

## 2019-08-20 NOTE — PROGRESS NOTES
Review of Dr. Jerry's order and note indicates that this is a referral for PCT consultation with Dr. Carter at [facility] for the purpose of diagnostic clarification.  Per Dr. Jerry, the patient reports that he has a therapist at [facility].  Would recommend that we connect with [facility] to get these records prior to visit with Dr. Carter if at all possible in order to reduce redundancy and fragmentation of services.  I do see we have a signed SHANTAL for [facility] from 8/12/19  It also appears that one of our social workers, Jayjay Soliz, had conversation with Kings, a behavioral health clinician from the Dual Diagnosis program at [facility] on 8/8/19.  They are considering Bipolar diagnosis at this time per conversation with Jayjay.  I am wondering if it would make sense to reach out to Kings for diagnostic clarification and current treatment plan prior to consultation with Dr. Carter to see if questions can be answered by current [facility] mental health team.  Would be good to see if team at [facility] may already be working on a plan for psychiatry.  If a visit with Dr. Carter still appears indicated after that consultation we could proceed with this referral at that time.  At the time of today's review, Dr. Carter's next available visit would be 9/16/19.  Will seek Dr. Jerry's input on plan to obtain diagnostic clarity with current mental health team prior to scheduling PCT visit with Dr. Carter.  If additional reasons for PCT visit are also present (e.g., desire for input on medications, etc.), we can also consider that going forward.    I also see that patient had a therapist (Sabrina Lerma) and psychiatrist (Tong Andrade) at [facility] noted on the care team from February 2019 (we do have an SHANTAL for [facility] from that date, but no records on file).  Would be helpful to obtain these records as well if patient did receive care with them.    Will ask our HIM team to reach out to both [facility] and [facility] to request these records.    Let me know if you  have questions or would like additional follow up from me.  Thanks!      Alexandria Rivas, Ph.D.,LP     Disclaimer  The above treatment recommendations are based on consultation with the patient's primary care provider and a review of relevant information in EPIC.? I have not personally examined the patient.? All recommendations should be implemented with considerations of the patient's relevant prior history and current clinical status.  Please contact me with any questions about the care of this patient.

## 2019-08-23 ASSESSMENT — ANXIETY QUESTIONNAIRES
2. NOT BEING ABLE TO STOP OR CONTROL WORRYING: MORE THAN HALF THE DAYS
1. FEELING NERVOUS, ANXIOUS, OR ON EDGE: NEARLY EVERY DAY
IF YOU CHECKED OFF ANY PROBLEMS ON THIS QUESTIONNAIRE, HOW DIFFICULT HAVE THESE PROBLEMS MADE IT FOR YOU TO DO YOUR WORK, TAKE CARE OF THINGS AT HOME, OR GET ALONG WITH OTHER PEOPLE: SOMEWHAT DIFFICULT
GAD7 TOTAL SCORE: 12
3. WORRYING TOO MUCH ABOUT DIFFERENT THINGS: MORE THAN HALF THE DAYS
6. BECOMING EASILY ANNOYED OR IRRITABLE: MORE THAN HALF THE DAYS
7. FEELING AFRAID AS IF SOMETHING AWFUL MIGHT HAPPEN: SEVERAL DAYS
5. BEING SO RESTLESS THAT IT IS HARD TO SIT STILL: SEVERAL DAYS

## 2019-08-23 ASSESSMENT — PATIENT HEALTH QUESTIONNAIRE - PHQ9
5. POOR APPETITE OR OVEREATING: SEVERAL DAYS
SUM OF ALL RESPONSES TO PHQ QUESTIONS 1-9: 11

## 2019-08-23 NOTE — PROGRESS NOTES
Preceptor Attestation:   Patient seen, evaluated and discussed with the resident. I have verified the content of the note, which accurately reflects my assessment of the patient and the plan of care.   Supervising Physician:  Allen Marks MD MD

## 2019-08-24 ASSESSMENT — ANXIETY QUESTIONNAIRES: GAD7 TOTAL SCORE: 12

## 2019-09-03 NOTE — PROGRESS NOTES
SW reached out to Kings Marinelli at the [facility] to request information regarding services and treatment plan from their agency. Kings did not answer. This SW did leave a detailed VM and requested a call back.     Will return the call in 2 business days if they have not yet called back at that time.     RAYNA Jasmine

## 2019-09-04 NOTE — PROGRESS NOTES
Thanks for your follow up on this Mari!    When I review Dr. Carter's schedule today, the first opening is 9/26/19.  While Mira Segundo will likely need ongoing psychiatry in the community, I am unsure what the timeline may be for connecting him to community based services.  It may still be helpful to connect to Dr. Carter for recommendations and bridging in the interim, but will ask for Dr. Jerry's input on that.  May make sense to work on identifying community provider at the same time and will ask for Mari's help with obtaining visit with one of the following community based providers below.  If able to get an appt prior to 9/26 or shortly thereafter, may be Ok to skip visit with Dr. Carter.    [facility]  [address]  [address]  [place], [place] [address]  [phone]    [facility]  [address]   [address]  [place] [address]  [phone] (for appointments)  Fax: [phone]  [facility]  [address]  [address]  [place] [address]  Phone:  [phone]  Fax: [phone]  Hours:  Monday - Friday 8:30 - 5:00pm    [facility]  [address]  [place] [address]  [phone]    [facility]  [address]  [place] [address]  [phone]    Alexandria Rivas, PhD, LP

## 2019-09-04 NOTE — PROGRESS NOTES
JUAN M spoke with KAJAL Ku at [facility]. He indicates that psychiatry is closed to new patients at [facility]. They do not have a plan to connect Mira Segundo to community psychiatry at this time, however, they are in support of that. Kings questioned who should be contacted about medications/side effects currently and JUAN M clarified that would be  at [facility]. Advised Kings to call [facility] main line and ask to speak with a nurse if there are concerns or questions around medications in the future. He indicated understanding of this.     JUAN M informed Kings that at this time, [facility] would proceed with facilitating the referral for psychiatry. Informed him of the PCT visit options and informed it is likely that Mira Segundo will be offered a PCT visit as a bridge before connecting with a community psychiatrist. Kings is in support of this plan.     Currently, Mira Segundo does have a therapist at [facility], DA Olmedo, KAJAL. He sees her on a regular basis. He also meets with Kings for CD counseling as well. JUAN M updated care teams with this information.     Routing back to  and  for advising on whether  visit should be offered at this time.     RAYNA Jasmine

## 2019-09-05 NOTE — PROGRESS NOTES
SW reached out to [facility] to estimate the wait time for new patient to see a psychiatrist. They indicate it is 'about a month, give or take'.     SW utilized Language [facility]  to reach out to . His primary listed phone number is not in service. This SW called his contact, patient's father. He reports that patient does not live with him anymore, patient lives with his aunt. Father does not know a better phone number to contact  at.     Given SW's inability to connect with patient and seeing that patient has an upcoming clinic appointment with  on 09/09/19 at 2:10pm, this SW will plan to meet with patient following his time with . SW will plan to call [facility] with , and the  present, to schedule an intake appointment for psychiatry. In the case that the [facility] appointment is more than 1.5 months out, this SW will offer PCT visit with .     Routing to team for FYI on the plan.     RAYNA Jasmine

## 2019-09-09 NOTE — PROGRESS NOTES
JUAN M recieved a call from KAJAL Ku at [facility]. He indicates that he brought Mira's case up during a team consultation last week. Although they are closed to new patients, they made an exception for Mira Segundo and will take him on for psychiatry cares at [facility]. It is unclear of the first appointment date/time. His provider will be Dr. Gonzalo Flores. SW added him to the care teams.     Routing this to [facility] team for FYI. No need for this SW to meet with patient to coordinate psychiatry.     RAYNA Jasmine

## 2019-09-10 DIAGNOSIS — F32.3 MAJOR DEPRESSIVE DISORDER, SINGLE EPISODE, SEVERE WITH PSYCHOTIC FEATURES (H): ICD-10-CM

## 2019-09-10 RX ORDER — CETIRIZINE HYDROCHLORIDE 10 MG/1
10 TABLET ORAL DAILY
Qty: 60 TABLET | Refills: 3 | Status: SHIPPED | OUTPATIENT
Start: 2019-09-10 | End: 2020-07-14

## 2019-09-16 ENCOUNTER — OFFICE VISIT (OUTPATIENT)
Dept: FAMILY MEDICINE | Facility: CLINIC | Age: 24
End: 2019-09-16
Payer: COMMERCIAL

## 2019-09-16 VITALS
HEIGHT: 63 IN | TEMPERATURE: 98.2 F | DIASTOLIC BLOOD PRESSURE: 82 MMHG | OXYGEN SATURATION: 98 % | HEART RATE: 95 BPM | WEIGHT: 130.4 LBS | SYSTOLIC BLOOD PRESSURE: 123 MMHG | BODY MASS INDEX: 23.11 KG/M2 | RESPIRATION RATE: 16 BRPM

## 2019-09-16 DIAGNOSIS — M54.50 CHRONIC BILATERAL LOW BACK PAIN WITHOUT SCIATICA: Primary | ICD-10-CM

## 2019-09-16 DIAGNOSIS — G89.29 CHRONIC BILATERAL LOW BACK PAIN WITHOUT SCIATICA: Primary | ICD-10-CM

## 2019-09-16 ASSESSMENT — ANXIETY QUESTIONNAIRES
5. BEING SO RESTLESS THAT IT IS HARD TO SIT STILL: MORE THAN HALF THE DAYS
3. WORRYING TOO MUCH ABOUT DIFFERENT THINGS: SEVERAL DAYS
1. FEELING NERVOUS, ANXIOUS, OR ON EDGE: MORE THAN HALF THE DAYS
7. FEELING AFRAID AS IF SOMETHING AWFUL MIGHT HAPPEN: MORE THAN HALF THE DAYS
6. BECOMING EASILY ANNOYED OR IRRITABLE: NEARLY EVERY DAY
2. NOT BEING ABLE TO STOP OR CONTROL WORRYING: SEVERAL DAYS
GAD7 TOTAL SCORE: 13

## 2019-09-16 ASSESSMENT — MIFFLIN-ST. JEOR: SCORE: 1476.62

## 2019-09-16 ASSESSMENT — PATIENT HEALTH QUESTIONNAIRE - PHQ9: 5. POOR APPETITE OR OVEREATING: MORE THAN HALF THE DAYS

## 2019-09-16 NOTE — NURSING NOTE
Due to patient being non-English speaking/uses sign language, an  was used for this visit. Only for face-to-face interpretation by an external agency, date and length of interpretation can be found on the scanned worksheet.       name: Steve Pereyra (Htoo)  Language: Norma  Agency:  [employer]  Phone number: [phone]  Type of interpretation:  Face-to-face, spoken

## 2019-09-16 NOTE — PROGRESS NOTES
SUBJECTIVE       Mira Segundo is a 24 year old male with a PMH significant for   Patient Active Problem List   Diagnosis     Adjustment disorder with mixed anxiety and depressed mood     Chronic motor or vocal tic disorder     Dyshidrosis     Mild intermittent asthma     Beta-Thalassemia Trait     Major depressive disorder, single episode, severe with psychotic features (H)     Seasonal allergic rhinitis    who presents for evaluation of back pain and sleep issues.     Sleep, mood  Patient reports that he has been doing well.  Sleep has been much improved but continues to struggle with this.  Reports that he feels to get him around 9 PM but is only able to sleep at 11 PM.  He usually wakes up at 8 in the morning.  It takes him between 30 minutes to 2 hours to be able to fall asleep.  But he does state that he gets about 7 hours of sleep nightly.  He reports that he tries to watch YouTube on his phone before he sleeps especially if he is having a hard time falling asleep.  Patient is currently taking his Seroquel 300 mg nightly and 25 mg in the morning.  He denies long episodes of going without sleep as has been the case in the past.  Does continue to endorse increased energy and flight of and sometimes rapid speech that his family has endorsed as well.  Discussed expectations around sleep and that 7 hours is considered adequate for most people.  In terms of his mood, he reports that this is been labile she is happy at times.  He denies any alcohol use, continues to go to treatment.  Denies any drug use.    Back pain  Patient also complains of pain in his back that has been going on for the past few years.  Denies any specific incidence of trauma that caused this.  He reports that it was insidious and has been progressively getting worse.  He reports the pain is 10/10 on the pain scale sometimes and it goes periods, he is unable to get out of bed.  He denies any numbness tingling down his legs denies any weakness.   "No bowel or bladder incontinence.    PHQ-9 SCORE 8/12/2019 8/23/2019 9/16/2019   PHQ-9 Total Score - - -   PHQ-9 Total Score 12 11 15       ROSELYN-7 SCORE 8/12/2019 8/23/2019 9/16/2019   Total Score 14 12 13     Patient speaks Norma, so an  was used.    Medications and problem list have been reviewed and updated.         REVIEW OF SYSTEMS     General: No fevers, chills  Head: No headache, dizziness  Resp: No cough. No congestion  GI: No constipation, diarrhea, no nausea or vomiting  Skin: No rash  MSK: Lower back pain.        OBJECTIVE MSK:     Vitals:    09/16/19 1512   BP: 123/82   BP Location: Left arm   Patient Position: Sitting   Cuff Size: Adult Regular   Pulse: 95   Resp: 16   Temp: 98.2  F (36.8  C)   TempSrc: Oral   SpO2: 98%   Weight: 59.1 kg (130 lb 6.4 oz)   Height: 1.6 m (5' 3\")     Body mass index is 23.1 kg/m .  Gen:  In NAD, good color, appears well hydrated  HEENT: No Scleral icterus or conjunctival injection  Neck: supple without lymphadenopathy  CV:  RRR  - no murmurs, age appropriate rate  Pulm:  CTAB, no wheezes/rales/rhonchi, good air entry   Abdomen: soft, nontender, no masses, no rebound, BS intact throughout  MSK: Range of motion with back flexion extension is limited due to pain but is able to flex almost to be able to touch his toes.  There is tenderness palpation along the lumbosacral strength in his lower extremities is preserved bilaterally.  Sensation is intact as well.  Skin: No rashes or lesions noted.    ASSESSMENT AND PLAN     Lo was seen today for back pain, sleep problem and nasal congestion.    Diagnoses and all orders for this visit:    Chronic bilateral low back pain without sciatica  Patient with known history of chronic bilateral pain without sciatica.  Has not had any imaging for this and so we will plan to lumbar spine to assess for this.  Unclear etiology at this point, no history of, however no symptoms of sciatica or leg flags at this time that would warrant any " type of other imaging.  We will plan to continue treatment conservative.  -  XR Lumbar Spine 2-3 Views*  -  Stretching exercises provided.  -  Tylenol as needed for pain.     Mood  Patient related history of depression, substance use including alcohol, marijuana, recent legal issues released from senior care currently doing well.  Continues to endorse some symptoms of/disorder.  Continues to be on fluoxetine at this time which could possibly be precipitating michelle however has been on this long-term avoid changing it at this time as he is currently in the process of getting plugged into care with [facility].  He is receiving therapy services there and will hopefully start seeing the psychiatrist as well.  We will defer this to his.  Patient is agreeable with this plan.  He will continue to take his Seroquel 300 mg and increase his morning dose to 50 mg.    Sleep issues  Discussed expectations about sleep including the fact that 7 hours is a good goal that he is currently been having.  If wanting to go to bed earlier in the evening recommended waking up earlier as well.  Discussed sleep hygiene as well like trying to avoid watching videos before going to bed.    Options for treatment and/or follow-up care were reviewed with the patient who was engaged and actively involved in the decision making process and verbalized understanding of the options discussed and was satisfied with the final plan. Risks and benefits of plan were carefully reviewed.     I, Cecy Jerry, have discussed patient findings with attending physician Quinten Ralph MD who was agreeable with plan.     Cecy Jerry MD, PGY3

## 2019-09-16 NOTE — PROGRESS NOTES
Preceptor Attestation:   Patient seen, evaluated and discussed with the resident. I have verified the content of the note, which accurately reflects my assessment of the patient and the plan of care.   Supervising Physician:  Ren Ralph MD MD

## 2019-09-16 NOTE — LETTER
September 20, 2019      Mira Segundo  [address]  [place] [address]        Dear Mira,    The imaging of your back did not show any abnormal. We will plan to see you at your next visit and we will revisit your next options. You should continue trying to do some of those exercises that was given and use Tylenol and Capsicin cream in the mean time.   If you have any questions, please call the clinic to make an appointment.    Sincerely,    Cecy Jerry MD

## 2019-09-16 NOTE — PATIENT INSTRUCTIONS
Thank you for coming to [facility] for your care. It was a pleasure to take care of you!    - Continue to go to [facility] for your therapy and be sure to follow up with psychiatry with them as they prepare to plan this.   - Xray of back today, I will call you with the results.   - Tylenol as needed for pain  - Capsacin cream to help with the areas in your back that hurts.   - I will call you with the results of your test.  - Follow up in one month to check back on your back pain and also mood.   - Increase your Seroquel to 50 mg in the morning.   - Practice sleep hygiene that we talked about.     Cecy Jerry MD    Patient Education   When You Have Low Back Pain  Caring for Your Back  You are not alone.  Low back pain is very common. Nearly half of all adults have low back pain in any given year.  The good news is that back pain is rarely a danger to your health. Most people can manage their back pain on their own and about half of them start feeling better within 2 weeks. In 9 out of 10 cases, low back pain goes away or no longer limits daily activity within 6 weeks.  Your outlook is good!  Your symptoms tell us that your low back pain is most likely not a danger to you. Most of the time we do not know the exact cause of low back pain, even if you see a doctor or have an MRI. However, treatment can still work without knowing the cause of the pain. Less than 1 in 100 people need surgery for their back pain.  What can I do about my low back pain?  There are three things you can do to ease low back pain and help it go away.    Use heat or cold packs.    Take medicine as directed.    Use positions, movements and exercises.  Using heat or cold packs  Try cold packs or gentle heat to ease your pain. Use whichever gives you the most relief. Apply the cold pack or heat for 15 minutes at a time, as often as needed.  Taking medicine    If your doctor has prescribed medicine, be sure to follow the  directions.    If you take over-the-counter medicine, read and follow the directions.    Talk to your doctor if you have any questions.  Using positions, movements and exercises  Research tells us that moving your joints and muscles can help you recover from back pain. Such activity should be simple and gentle.  Use the positions in the photos as well as walking to help relieve your pain. Try taking a short walk every 3 to 4 hours during the day. Walk for a few minutes inside your home or take longer walks outside, on a treadmill or at a mall. Slowly increase the amount of time you walk.  Expect discomfort when you begin, but it should lessen as your back starts to heal. When your back feels better, walk daily to keep your back and body healthy.  Finding a comfortable position  When your back pain is new, certain positions will ease your pain. Gently try each of the positions below until you find one that is helpful. Once you find a position of comfort, use it as often as you like when you are resting. You will recover faster if you combine rest with activity.         When should I call my doctor?  Your back pain should improve over the first couple of weeks. As it improves, you should be able to return to your normal activities. But call your doctor if:    You have a sudden change in your ability to control?your bladder or bowels.    You feel tingling in your groin or legs.    The pain spreads down your leg and into your foot.    Your toes, feet or leg muscles feel weak.    You feel generally unwell or sick.    Your pain does not get better or gets worse.    For informational purposes only. Not to replace the advice of your health care provider.  Copyright   2013 [employer]. All rights reserved. [employer] 964320 - REV 03/16.

## 2019-09-17 ASSESSMENT — PATIENT HEALTH QUESTIONNAIRE - PHQ9: SUM OF ALL RESPONSES TO PHQ QUESTIONS 1-9: 15

## 2019-09-18 ASSESSMENT — ANXIETY QUESTIONNAIRES: GAD7 TOTAL SCORE: 13

## 2019-10-03 ENCOUNTER — OFFICE VISIT (OUTPATIENT)
Dept: FAMILY MEDICINE | Facility: CLINIC | Age: 24
End: 2019-10-03
Payer: COMMERCIAL

## 2019-10-03 VITALS
BODY MASS INDEX: 23.81 KG/M2 | HEART RATE: 92 BPM | DIASTOLIC BLOOD PRESSURE: 86 MMHG | SYSTOLIC BLOOD PRESSURE: 124 MMHG | OXYGEN SATURATION: 100 % | HEIGHT: 63 IN | WEIGHT: 134.4 LBS | TEMPERATURE: 97.7 F | RESPIRATION RATE: 18 BRPM

## 2019-10-03 DIAGNOSIS — Z23 NEED FOR PROPHYLACTIC VACCINATION AND INOCULATION AGAINST INFLUENZA: ICD-10-CM

## 2019-10-03 DIAGNOSIS — R13.10 DYSPHAGIA, UNSPECIFIED TYPE: Primary | ICD-10-CM

## 2019-10-03 RX ORDER — GUAIFENESIN 200 MG/1
200 TABLET ORAL 3 TIMES DAILY
Qty: 30 TABLET | Refills: 0 | Status: SHIPPED | OUTPATIENT
Start: 2019-10-03 | End: 2019-12-05

## 2019-10-03 ASSESSMENT — PATIENT HEALTH QUESTIONNAIRE - PHQ9
SUM OF ALL RESPONSES TO PHQ QUESTIONS 1-9: 13
5. POOR APPETITE OR OVEREATING: MORE THAN HALF THE DAYS

## 2019-10-03 ASSESSMENT — ANXIETY QUESTIONNAIRES
GAD7 TOTAL SCORE: 12
6. BECOMING EASILY ANNOYED OR IRRITABLE: MORE THAN HALF THE DAYS
7. FEELING AFRAID AS IF SOMETHING AWFUL MIGHT HAPPEN: NEARLY EVERY DAY
2. NOT BEING ABLE TO STOP OR CONTROL WORRYING: MORE THAN HALF THE DAYS
5. BEING SO RESTLESS THAT IT IS HARD TO SIT STILL: NOT AT ALL
1. FEELING NERVOUS, ANXIOUS, OR ON EDGE: SEVERAL DAYS
3. WORRYING TOO MUCH ABOUT DIFFERENT THINGS: MORE THAN HALF THE DAYS

## 2019-10-03 ASSESSMENT — MIFFLIN-ST. JEOR: SCORE: 1494.76

## 2019-10-03 NOTE — NURSING NOTE
Due to patient being non-English speaking/uses sign language, an  was used for this visit. Only for face-to-face interpretation by an external agency, date and length of interpretation can be found on the scanned worksheet.       name: Steve Pereyra (Htoo)  Language: Norma  Agency:  [employer]  Phone number: [phone]  Type of interpretation:  Face-to-face, spoken

## 2019-10-03 NOTE — PATIENT INSTRUCTIONS
"10 days of throat sensation. Noticed more when swallowing    Post nasal drainage most likely cause. Less likely acid reflux of side effect from inhaled steroid  1) Continue zytrec daily  2) Drink fluids  3) Add generic \"mucinex\" to moisturize sore area and get it to heal    May need ENT specialist to look at area in clinic.  Return as scheduled with Dr. Jerry to give update  "

## 2019-10-04 ASSESSMENT — ANXIETY QUESTIONNAIRES: GAD7 TOTAL SCORE: 12

## 2019-10-04 NOTE — PROGRESS NOTES
"       SUBJECTIVE       Lo Sanya is a 24 year old  male with a PMH significant for:     Patient Active Problem List   Diagnosis     Adjustment disorder with mixed anxiety and depressed mood     Chronic motor or vocal tic disorder     Dyshidrosis     Mild intermittent asthma     Beta-Thalassemia Trait     Major depressive disorder, single episode, severe with psychotic features (H)     Seasonal allergic rhinitis     He presents with 10 days of throat sensation. Noticed more when swallowing    Post nasal drainage most likely cause. Less likely acid reflux of side effect from inhaled steroid.    PMH, Medications and Allergies were reviewed and updated as needed.        REVIEW OF SYSTEMS     General: No fevers, chills, sweats, unexplained weight loss  Head: No headache  Neck: See HPI  CV: No chest pain or palpitations  Resp: No shortness of breath.  No cough. No hemoptysis.  GI: No constipation, diarrhea, or blood in stool.  no nausea or vomiting  : No pain passing urine or urinary frequency            OBJECTIVE     Vitals:    10/03/19 1233 10/03/19 1238   BP: (!) 129/91 124/86   Pulse: 92    Resp: 18    Temp: 97.7  F (36.5  C)    TempSrc: Oral    SpO2: 100%    Weight: 61 kg (134 lb 6.4 oz)    Height: 1.6 m (5' 3\")      Body mass index is 23.81 kg/m .    Gen:  Well nourished and in NAD  HEENT: PERRLA; TMs normal color and landmarks; nasopharynx pink and moist;   oropharynx posterior erythema, NO exudate  Neck: supple without lymphadenopathy  CV:  RRR  - no murmurs, rubs, or gallups,   Pulm:  CTAB, no wheezes/rales/rhonchi, good air entry   ABD: soft, nontender, no masses, no rebound, BS intact throughout  Extrem: no cyanosis, edema or clubbing  Psych: Euthymic     No results found for this or any previous visit (from the past 24 hour(s)).        ASSESSMENT AND PLAN     Mira was seen today for pharyngitis and imm/inj.    Diagnoses and all orders for this visit:    Need for prophylactic vaccination and inoculation against " "influenza  -     Fluzone quad, multidose 0.5ml, 6+ months [63845]    Dysphagia, unspecified type  -     guaiFENesin (ORGANIDIN) 200 MG tablet; Take 1 tablet (200 mg) by mouth 3 times daily Until throat sensation clears        Patient Instructions   10 days of throat sensation. Noticed more when swallowing    Post nasal drainage most likely cause. Less likely acid reflux of side effect from inhaled steroid  1) Continue zytrec daily  2) Drink fluids  3) Add generic \"mucinex\" to moisturize sore area and get it to heal    May need ENT specialist to look at area in clinic.  Return as scheduled with Dr. Jerry to give update      Total of 15 minutes was spent in face to face contact with patient with > 50% in counseling and coordination of care.  Options for treatment and/or follow-up care were reviewed with the patient. Mira Segundo was engaged and actively involved in the decision making process. He verbalized understanding of the options discussed and was satisfied with the final plan.    Lamont Kearney MD MD        "

## 2019-10-24 DIAGNOSIS — Z00.00 PREVENTATIVE HEALTH CARE: ICD-10-CM

## 2019-10-24 RX ORDER — ACETAMINOPHEN 500 MG
500-1000 TABLET ORAL EVERY 6 HOURS PRN
Qty: 60 TABLET | Refills: 1 | Status: SHIPPED | OUTPATIENT
Start: 2019-10-24 | End: 2020-02-25

## 2019-11-07 PROBLEM — F22 PARANOIA (H): Status: ACTIVE | Noted: 2019-11-07

## 2019-12-05 ENCOUNTER — OFFICE VISIT (OUTPATIENT)
Dept: FAMILY MEDICINE | Facility: CLINIC | Age: 24
End: 2019-12-05
Payer: COMMERCIAL

## 2019-12-05 VITALS
SYSTOLIC BLOOD PRESSURE: 122 MMHG | BODY MASS INDEX: 24.1 KG/M2 | OXYGEN SATURATION: 100 % | DIASTOLIC BLOOD PRESSURE: 84 MMHG | RESPIRATION RATE: 22 BRPM | TEMPERATURE: 98.1 F | WEIGHT: 136 LBS | HEIGHT: 63 IN | HEART RATE: 78 BPM

## 2019-12-05 DIAGNOSIS — G47.00 INSOMNIA, UNSPECIFIED TYPE: ICD-10-CM

## 2019-12-05 DIAGNOSIS — F32.3 MAJOR DEPRESSIVE DISORDER, SINGLE EPISODE, SEVERE WITH PSYCHOTIC FEATURES (H): Primary | ICD-10-CM

## 2019-12-05 ASSESSMENT — ANXIETY QUESTIONNAIRES
GAD7 TOTAL SCORE: 12
5. BEING SO RESTLESS THAT IT IS HARD TO SIT STILL: MORE THAN HALF THE DAYS
1. FEELING NERVOUS, ANXIOUS, OR ON EDGE: MORE THAN HALF THE DAYS
6. BECOMING EASILY ANNOYED OR IRRITABLE: MORE THAN HALF THE DAYS
7. FEELING AFRAID AS IF SOMETHING AWFUL MIGHT HAPPEN: MORE THAN HALF THE DAYS
3. WORRYING TOO MUCH ABOUT DIFFERENT THINGS: SEVERAL DAYS
2. NOT BEING ABLE TO STOP OR CONTROL WORRYING: SEVERAL DAYS

## 2019-12-05 ASSESSMENT — MIFFLIN-ST. JEOR: SCORE: 1502.02

## 2019-12-05 ASSESSMENT — PATIENT HEALTH QUESTIONNAIRE - PHQ9
5. POOR APPETITE OR OVEREATING: MORE THAN HALF THE DAYS
SUM OF ALL RESPONSES TO PHQ QUESTIONS 1-9: 16

## 2019-12-05 NOTE — NURSING NOTE
Due to patient being non-English speaking/uses sign language, an  was used for this visit. Only for face-to-face interpretation by an external agency, date and length of interpretation can be found on the scanned worksheet.       name: Steve Pereyra (Htoo)  Language: Norma  Agency:  [employer]  Phone number: [phone]  Type of interpretation:  Face-to-face, spoken

## 2019-12-05 NOTE — PATIENT INSTRUCTIONS
Thank you for coming to [facility] for your care. It was a pleasure to take care of you!    - Great job on taking good care of your health, KEEP IT UP!  - Plan to follow up with Psychiatry and you therapist.   - Continue to taking you meds  - Let us know if your asthma symptoms get worse because we might need to start your asthma medication. .    Cecy Jerry MD

## 2019-12-05 NOTE — PROGRESS NOTES
Preceptor Attestation:   Patient seen, evaluated and discussed with the resident. I have verified the content of the note, which accurately reflects my assessment of the patient and the plan of care.   Supervising Physician:  Iker Wang MD.

## 2019-12-05 NOTE — PROGRESS NOTES
fabrizio Segundo is a 24 year old male with a PMH significant for   Patient Active Problem List   Diagnosis     Adjustment disorder with mixed anxiety and depressed mood     Chronic motor or vocal tic disorder     Dyshidrosis     Mild intermittent asthma     Beta-Thalassemia Trait     Major depressive disorder, single episode, severe with psychotic features (H)     Seasonal allergic rhinitis     Paranoia (H)    who presents for evaluation of med check and sleep.     Med check and sleep  Patient coming in today for follow-up.  Was seen a couple of days ago for some chronic pain, reports that this has been slowly improving.  He has been having a runny nose as well as cough that is worse at night.  Denies any other symptoms including acidic taste in his mouth, or chest pain although he does report some irritation in his chest associated with his cough.  Patient reports that his mood has been improving, reports that he has been much happier than before.  Of note his PHQ 9 today is 16 compared to 13 in October and pipe 7 is stable at 12.  He denies any suicidal or homicidal intent.  He is currently being seen at [facility] for therapy every week as well as seeing a a psychiatrist every month.  He just had his intake visit but has not had any major medication changes yet.  He is continuing to take his Seroquel 300 mg at night as well as 50 mg during the day.  Patient does have an upcoming jury trial for possible sexual assault case that was questionably perpetrated against his siblings which has since been recanted sometime in January.  He does report that he has had increased anxiety over this.  He is currently staying with his aunt.  Patient denies any substance use.  He graduated from his [employer] program a few months ago and has been sober since.  He reports that his sleep is now much improved.  Unfortunately patient missed a few appointments prior to this due to transportation issues.    PHQ-9 SCORE  "9/16/2019 10/3/2019 12/5/2019   PHQ-9 Total Score - - -   PHQ-9 Total Score 15 13 16     ROSELYN-7 SCORE 9/16/2019 10/3/2019 12/5/2019   Total Score 13 12 12     Patient speaks Norma, so an  was not used.    Medications and problem list have been reviewed and updated.         REVIEW OF SYSTEMS     General: No fevers, chills  Head: No headache, dizziness  Neck: No swallowing problems   Resp: No cough. No congestion  GI: No constipation, diarrhea, no nausea or vomiting  Skin: No rash        OBJECTIVE     Vitals:    12/05/19 1108   BP: 122/84   BP Location: Right arm   Patient Position: Sitting   Cuff Size: Adult Regular   Pulse: 78   Resp: 22   Temp: 98.1  F (36.7  C)   TempSrc: Oral   SpO2: 100%   Weight: 61.7 kg (136 lb)   Height: 1.6 m (5' 3\")     Body mass index is 24.09 kg/m .  Gen:  In NAD, good color, appears well hydrated  HEENT: No Scleral icterus or conjunctival injection  Neck: supple without lymphadenopathy  CV:  RRR  - no murmurs, age appropriate rate  Pulm:  CTAB, no wheezes/rales/rhonchi, good air entry   Abdomen: Did not assess.  Skin: No rashes or lesions noted.    No results found for this or any previous visit (from the past 24 hour(s)).    ASSESSMENT AND PLAN     Lo was seen today for recheck.    Diagnoses and all orders for this visit:    Major depressive disorder, single episode, severe with psychotic features (H)  Insomnia, unspecified type  Patient coming in today for follow-up of his mental health as well as his sleep issues.  Reports that his mood is been better sleep is improved as well.  Currently plugged into care with [facility] seeing a therapist as well as a psychiatrist.  Is anticipating a change in his medications to help further stabilize his symptoms.  Currently more anxious as he has a jury trial coming in in January due to sexual assault charges.  He adamantly denies any homicidal or suicidal intent.  Will continue to follow-up in future visits.  -      : Sign " Language or Oral - 38-52 minute    History of asthma  Patient scored 16 on his most recent ACT scores.  ACT score is improved today to 20.  Patient had been prescribed controller medication however has not started this.  He is only been needing to use his rescue inhaler once or twice a week.  He has not picked his medication mainly due to insurance issues.  He would like to wait on this and will come back to see us or call if having worsening symptoms.    Options for treatment and/or follow-up care were reviewed with the patient who was engaged and actively involved in the decision making process and verbalized understanding of the options discussed and was satisfied with the final plan. Risks and benefits of plan were carefully reviewed.     I, Cecy Jerry, have discussed patient findings with attending physician Iker Wang MD who was agreeable with plan.     Cecy Jerry MD, PGY3

## 2019-12-06 ASSESSMENT — ASTHMA QUESTIONNAIRES: ACT_TOTALSCORE: 19

## 2019-12-06 ASSESSMENT — ANXIETY QUESTIONNAIRES: GAD7 TOTAL SCORE: 12

## 2020-02-25 DIAGNOSIS — F32.3 MAJOR DEPRESSIVE DISORDER, SINGLE EPISODE, SEVERE WITH PSYCHOTIC FEATURES (H): Primary | ICD-10-CM

## 2020-02-28 NOTE — TELEPHONE ENCOUNTER
[facility] phone call message- medication clarification/question:    Full Medication Name:    FLUoxetine (PROZAC) 20 MG capsule  Sig - Route: Take 1 capsule (20 mg) by mouth 2 times daily Take 2 capsules by mouth 2 times daily - Oral    Question:     Needing clarification for direction    Pharmacy confirmed as      [facility] - [place], [place] - [address]: Yes    OK to leave a message on voice mail? Yes    Primary language: Norma      needed? Yes    Call taken on February 28, 2020 at 9:54 AM by Sujatha Woodruff CMA

## 2020-02-28 NOTE — TELEPHONE ENCOUNTER
Called patient to encourage follow up per Dr. Jerry. Number busy, unable to leave message. Letter sent.      Last seen at [facility] on 2/25/20. They are currently prescribing his Prozac (Dr. Flores).     Routed to Dr. Jerry. ./LR

## 2020-03-02 NOTE — TELEPHONE ENCOUNTER
The pharmacy is calling back because the directions on this medication doesn't not make since he called last week about this but they were told that the prescription was transferred to another doctor and they provided her with a phone number but when she called that number they told her that is not a patient with them.She needs a call back so they can get the patient the medication they need.

## 2020-03-03 DIAGNOSIS — F32.3 MAJOR DEPRESSIVE DISORDER, SINGLE EPISODE, SEVERE WITH PSYCHOTIC FEATURES (H): ICD-10-CM

## 2020-03-03 RX ORDER — FLUOXETINE 40 MG/1
40 CAPSULE ORAL DAILY
Qty: 30 CAPSULE | Refills: 0 | Status: SHIPPED | OUTPATIENT
Start: 2020-03-03 | End: 2020-03-09

## 2020-03-03 NOTE — TELEPHONE ENCOUNTER
Called patient with assistance of language line Norma  [phone].     Father provided me with number [phone] to call. Patient states he has been taking this medication every day.  He has been waiting for a refill from [facility] to .     He will have Steve Pereyra, , assist him in scheduling a follow up appointment with Dr. Jerry. ./ALAN

## 2020-03-03 NOTE — PROGRESS NOTES
Refilled Fluoxetine for patient appears to be that patient has been taking 40 mg daily. Unable to connect with [facility] to clarify if there were any other changes to meds. Will refill as 40 mg once daily dosing.     BS

## 2020-03-09 ENCOUNTER — TELEPHONE (OUTPATIENT)
Dept: FAMILY MEDICINE | Facility: CLINIC | Age: 25
End: 2020-03-09

## 2020-03-09 RX ORDER — FLUOXETINE 10 MG/1
10 CAPSULE ORAL DAILY
COMMUNITY
Start: 2020-03-06 | End: 2022-06-03

## 2020-03-09 NOTE — TELEPHONE ENCOUNTER
Received call from Soren, staff at [facility]. She states that they have taken over the prescribing of Prozac, and recently started the patient at 10mg. She spoke with patient to confirm he is taking the correct dose at this time, and not the 40mg we had prescribed earlier.     She notes they will also be prescribing Seroquel. Written in speciality comments.     Routed to Dr. Jerry. ./ALAN

## 2020-05-08 ENCOUNTER — VIRTUAL VISIT (OUTPATIENT)
Dept: FAMILY MEDICINE | Facility: CLINIC | Age: 25
End: 2020-05-08
Payer: COMMERCIAL

## 2020-05-08 VITALS — BODY MASS INDEX: 21.26 KG/M2 | WEIGHT: 120 LBS | HEIGHT: 63 IN

## 2020-05-08 DIAGNOSIS — F32.3 MAJOR DEPRESSIVE DISORDER, SINGLE EPISODE, SEVERE WITH PSYCHOTIC FEATURES (H): ICD-10-CM

## 2020-05-08 DIAGNOSIS — F30.9 MANIA (H): ICD-10-CM

## 2020-05-08 DIAGNOSIS — J45.40 MODERATE PERSISTENT ASTHMA WITHOUT COMPLICATION: Primary | ICD-10-CM

## 2020-05-08 RX ORDER — QUETIAPINE FUMARATE 200 MG/1
200 TABLET, FILM COATED ORAL AT BEDTIME
COMMUNITY
End: 2020-10-28

## 2020-05-08 RX ORDER — FLUTICASONE PROPIONATE AND SALMETEROL XINAFOATE 45; 21 UG/1; UG/1
2 AEROSOL, METERED RESPIRATORY (INHALATION) 2 TIMES DAILY
Qty: 12 G | Refills: 0 | Status: SHIPPED | OUTPATIENT
Start: 2020-05-08 | End: 2020-05-27 | Stop reason: ALTCHOICE

## 2020-05-08 ASSESSMENT — ASTHMA QUESTIONNAIRES
QUESTION_5 LAST FOUR WEEKS HOW WOULD YOU RATE YOUR ASTHMA CONTROL: SOMEWHAT CONTROLLED
ACT_TOTALSCORE: 14
QUESTION_3 LAST FOUR WEEKS HOW OFTEN DID YOUR ASTHMA SYMPTOMS (WHEEZING, COUGHING, SHORTNESS OF BREATH, CHEST TIGHTNESS OR PAIN) WAKE YOU UP AT NIGHT OR EARLIER THAN USUAL IN THE MORNING: TWO OR THREE NIGHTS A WEEK
QUESTION_1 LAST FOUR WEEKS HOW MUCH OF THE TIME DID YOUR ASTHMA KEEP YOU FROM GETTING AS MUCH DONE AT WORK, SCHOOL OR AT HOME: MOST OF THE TIME
QUESTION_4 LAST FOUR WEEKS HOW OFTEN HAVE YOU USED YOUR RESCUE INHALER OR NEBULIZER MEDICATION (SUCH AS ALBUTEROL): TWO OR THREE TIMES PER WEEK
QUESTION_2 LAST FOUR WEEKS HOW OFTEN HAVE YOU HAD SHORTNESS OF BREATH: ONCE OR TWICE A WEEK

## 2020-05-08 ASSESSMENT — PATIENT HEALTH QUESTIONNAIRE - PHQ9: SUM OF ALL RESPONSES TO PHQ QUESTIONS 1-9: 22

## 2020-05-08 ASSESSMENT — MIFFLIN-ST. JEOR: SCORE: 1424.45

## 2020-05-08 NOTE — PROGRESS NOTES
Preceptor Attestation:    I talked to the patient on the phone and discussed the patient with the resident. I have verified the content of the note, which accurately reflects my assessment of the patient and the plan of care.   Supervising Physician:  Allen Marks MD.
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
## 2020-05-09 ASSESSMENT — ASTHMA QUESTIONNAIRES: ACT_TOTALSCORE: 14

## 2020-05-27 ENCOUNTER — VIRTUAL VISIT (OUTPATIENT)
Dept: FAMILY MEDICINE | Facility: CLINIC | Age: 25
End: 2020-05-27
Payer: COMMERCIAL

## 2020-05-27 VITALS — BODY MASS INDEX: 22.45 KG/M2 | HEIGHT: 62 IN | WEIGHT: 122 LBS

## 2020-05-27 DIAGNOSIS — F32.3 MAJOR DEPRESSIVE DISORDER, SINGLE EPISODE, SEVERE WITH PSYCHOTIC FEATURES (H): ICD-10-CM

## 2020-05-27 DIAGNOSIS — J45.40 MODERATE PERSISTENT ASTHMA WITHOUT COMPLICATION: Primary | ICD-10-CM

## 2020-05-27 RX ORDER — FLUTICASONE PROPIONATE AND SALMETEROL XINAFOATE 115; 21 UG/1; UG/1
2 AEROSOL, METERED RESPIRATORY (INHALATION) 2 TIMES DAILY
Qty: 12 G | Refills: 1 | Status: SHIPPED | OUTPATIENT
Start: 2020-05-27 | End: 2020-07-17 | Stop reason: DRUGHIGH

## 2020-05-27 RX ORDER — MONTELUKAST SODIUM 10 MG/1
10 TABLET ORAL AT BEDTIME
Qty: 30 TABLET | Refills: 1 | Status: SHIPPED | OUTPATIENT
Start: 2020-05-27 | End: 2020-07-17

## 2020-05-27 ASSESSMENT — MIFFLIN-ST. JEOR: SCORE: 1417.64

## 2020-05-27 ASSESSMENT — PATIENT HEALTH QUESTIONNAIRE - PHQ9: SUM OF ALL RESPONSES TO PHQ QUESTIONS 1-9: 19

## 2020-05-27 NOTE — PROGRESS NOTES
Preceptor Attestation:   I talked to the patient on the phone. I discussed the patient with the resident. I have verified the content of the note, which accurately reflects my assessment of the patient and the plan of care.   Supervising Physician:  Iker Wang MD.

## 2020-05-27 NOTE — PROGRESS NOTES
"Family Medicine Telephone Visit Note         Telephone Visit Consent   Patient was verbally read the following and verbal consent was obtained.    \"Telephone visits are billed at different rates depending on your insurance coverage. During this emergency period, for some insurers they may be billed the same as an in-person visit.  Please reach out to your insurance provider with any questions.  If during the course of the call the physician/provider feels a telephone visit is not appropriate, you will not be charged for this service.\"    Name person giving consent:  Patient   Date verbal consent given:  5/27/2020  Time verbal consent given:  10:03 AM     Chief Complaint   Patient presents with     Follow Up     follow up      Due to patient being non-English speaking/uses sign language, an  was used for this visit. Only for face-to-face interpretation by an external agency, date and length of interpretation can be found on the scanned worksheet.     name: Steve Pereyra  Agency: [employer]  Language: Norma   Telephone number: [phone]  Type of interpretation: Telephone, spoken         HPI   Patients name: Mira  Appointment start time:  10:14 AM    Asthma follow up  Calling in today to follow up on his asthma. Last visit was 2 weeks ago. Started using the daily inhaler that was started for him Advair, and reports that his symptoms are now much improved. His breathing has been better. His cough and wheezing are better but he continues to have some wheezing. Has been using 2 puffs in am, 2 puffs at night. He has not had to use his rescue inhaler but he had been using this about 5 times a week.     Mental health  In terms of his mental health. He continues to endorse being depressed. Has been having weekly therapy which has bene helpful. Has been having both auditory and visual hallucinations. Hears people saying that they will kill him and he also sees ghost figures and random people from the corner of " "his eye. He tries to meditate to keep the voices and thoughts away and says that this has been helpful. Reports that his hallucination scare him. Hallucinations have been the same since it started has not gotten better or worse. Has an appointment with his psychiatrist on 6/13/20. Would like to wait until       Current Outpatient Medications   Medication Sig Dispense Refill     acetaminophen (TYLENOL) 500 MG tablet TAKE 1-2 TABLETS (500-1,000 MG) BY MOUTH EVERY 6 HOURS AS NEEDED FOR MILD PAIN 60 tablet 1     albuterol (PROAIR HFA/PROVENTIL HFA/VENTOLIN HFA) 108 (90 Base) MCG/ACT inhaler Inhale 2 puffs into the lungs every 6 hours as needed for shortness of breath / dyspnea 2 Inhaler 3     cetirizine (ZYRTEC) 10 MG tablet Take 1 tablet (10 mg) by mouth daily 60 tablet 3     FLUoxetine (PROZAC) 10 MG capsule Take 10 mg by mouth daily       fluticasone-salmeterol (ADVAIR-HFA) 115-21 MCG/ACT inhaler Inhale 2 puffs into the lungs 2 times daily 12 g 1     montelukast (SINGULAIR) 10 MG tablet Take 1 tablet (10 mg) by mouth At Bedtime 30 tablet 1     QUEtiapine (SEROQUEL) 200 MG tablet Take 200 mg by mouth At Bedtime       ibuprofen (IBU) 600 MG tablet Take 1 tablet (600 mg) by mouth every 6 hours as needed for pain (Patient not taking: Reported on 5/27/2020) 60 tablet 0     Allergies   Allergen Reactions     Nkda [No Known Drug Allergies]           Review of Systems:     Constitutional, HEENT, cardiovascular, pulmonary, gi and gu systems are negative, except as otherwise noted.         Physical Exam:     Ht 1.575 m (5' 2\")   Wt 55.3 kg (122 lb)   BMI 22.31 kg/m    Estimated body mass index is 22.31 kg/m  as calculated from the following:    Height as of this encounter: 1.575 m (5' 2\").    Weight as of this encounter: 55.3 kg (122 lb).    Exam:  Constitutional: healthy, alert and no distress  Psychiatric: mentation appears normal and affect normal/bright     Results from the last 24 hoursNo results found for this or any " previous visit (from the past 24 hour(s)).        Assessment and Plan   Lo was seen today for follow up.    Diagnoses and all orders for this visit:    Moderate persistent asthma without complication  Hx of moderate persistent asthma, with significant symptoms recent escalation to ICS-LABA however only made about a 25% improvement in his breathing. He had been on a low dose combo and so will change to a higher dose of this. Also has history of allergic rhinitis and asthma symptoms exacerbated by exposure to allergens. Will add montelukast as well to help control symptoms better. He is agreeable with plan. Will follow up with him in 3 weeks to follow up on his symptoms.   -     montelukast (SINGULAIR) 10 MG tablet; Take 1 tablet (10 mg) by mouth At Bedtime  -     fluticasone-salmeterol (ADVAIR-HFA) 115-21 MCG/ACT inhaler; Inhale 2 puffs into the lungs 2 times daily    MDD with psychotic features  Patient is a 25-year-old male with past medical history of MDD, substance use disorder on remission, adjustment disorder and?  Danika/bipolar disorder who is following up for mental health issues.  Continues to endorse persistent low mood despite being on his current medications. Has been having weekly therapy but yet to see psychiatry for this. Has an appointment coming up on 6/12/20. Continue to have hallucination visual and auditory but has been using some exercises to meditate his focus away from this. No active suicidal plan at this time, encouraged to reach out to clinic or his crisis providers if any concerns arise. Has a follow up with [facility] coming up on 6/13 and is ok with waiting until then to make any medication changes. Recommended that he address his ongoing symptoms with them.     Refilled medications that would be required in the next 3 months.     After Visit Information:  Patient declined AVS     No follow-ups on file.    Appointment end time: 10:44 AM  This is a telephone visit that took 20  minutes.    Clinician location:  [facility].     Cecy Jerry MD  I precepted today with Dr. Iker Wang.

## 2020-06-15 ENCOUNTER — VIRTUAL VISIT (OUTPATIENT)
Dept: FAMILY MEDICINE | Facility: CLINIC | Age: 25
End: 2020-06-15
Payer: COMMERCIAL

## 2020-06-15 DIAGNOSIS — J06.9 VIRAL URI WITH COUGH: ICD-10-CM

## 2020-06-15 DIAGNOSIS — J30.1 SEASONAL ALLERGIC RHINITIS DUE TO POLLEN: Primary | ICD-10-CM

## 2020-06-15 DIAGNOSIS — J45.41 MODERATE PERSISTENT ASTHMA WITH ACUTE EXACERBATION: ICD-10-CM

## 2020-06-15 RX ORDER — FLUTICASONE PROPIONATE 50 MCG
1 SPRAY, SUSPENSION (ML) NASAL DAILY
Qty: 11.1 ML | Refills: 0 | Status: SHIPPED | OUTPATIENT
Start: 2020-06-15 | End: 2020-07-17

## 2020-06-15 ASSESSMENT — PATIENT HEALTH QUESTIONNAIRE - PHQ9: SUM OF ALL RESPONSES TO PHQ QUESTIONS 1-9: 19

## 2020-06-15 NOTE — PROGRESS NOTES
"Family Medicine Telephone Visit Note         Telephone Visit Consent   Patient was verbally read the following and verbal consent was obtained.    \"Telephone visits are billed at different rates depending on your insurance coverage. During this emergency period, for some insurers they may be billed the same as an in-person visit.  Please reach out to your insurance provider with any questions.  If during the course of the call the physician/provider feels a telephone visit is not appropriate, you will not be charged for this service.\"    Name person giving consent:  Patient   Date verbal consent given:  6/15/2020  Time verbal consent given:  10:21 AM    Chief Complaint   Patient presents with     Follow Up     Asthma      name: Steve Pereyra  Agency: [employer]  Language: Norma   Telephone number: [phone]  Type of interpretation: Telephone, spoken         HPI   Patients name: Mira  Appointment start time:  10:22 AM    Asthma  Reports that his asthma has been acting up. States that he went out to play soccer and it was windy and came home that night and felt that his asthma was worse. This was about 2 weeks ago. Reports ongoing dry cough, shortness of breath, sneezing and wheezing. Also reports subjective fever. Denies any sick contacts, denies anyone COVID19. Was not wearing mask when he was out playing soccer. No fevers currently. No body aches or chills. He did  his advair and Singulair as well. Currently has had to use his NATE about 2 - 3 times a day. Did pre-treat 15 minutes before exercise.     Mental health  Reports ongoing symptoms of depression, has been seeing his therapist weekly. Last month when he was seen at [place] reports that his Seroquel was decreased but no other medication changes were made. Was having side effects from it including stuffy nose. Reports that his auditory hallucinations are ongoing but have not gotten any worse. Denies any thoughts of self harm or HI.     Current " "Outpatient Medications   Medication Sig Dispense Refill     acetaminophen (TYLENOL) 500 MG tablet TAKE 1-2 TABLETS (500-1,000 MG) BY MOUTH EVERY 6 HOURS AS NEEDED FOR MILD PAIN 60 tablet 1     FLUoxetine (PROZAC) 10 MG capsule Take 10 mg by mouth daily       fluticasone (FLONASE) 50 MCG/ACT nasal spray Spray 1 spray into both nostrils daily 11.1 mL 0     QUEtiapine (SEROQUEL) 200 MG tablet Take 200 mg by mouth At Bedtime       ADVAIR HFA 45-21 MCG/ACT inhaler INHALE 2 PUFFS INTO THE LUNGS 2 TIMES DAILY 12 g 0     albuterol (PROAIR HFA/PROVENTIL HFA/VENTOLIN HFA) 108 (90 Base) MCG/ACT inhaler Inhale 2 puffs into the lungs every 6 hours as needed for shortness of breath / dyspnea 2 Inhaler 3     cetirizine (ZYRTEC) 10 MG tablet Take 1 tablet (10 mg) by mouth daily 60 tablet 3     fluticasone-salmeterol (ADVAIR-HFA) 115-21 MCG/ACT inhaler Inhale 2 puffs into the lungs 2 times daily 12 g 1     montelukast (SINGULAIR) 10 MG tablet Take 1 tablet (10 mg) by mouth At Bedtime 30 tablet 1     Allergies   Allergen Reactions     Nkda [No Known Drug Allergies]           Review of Systems:     Constitutional, HEENT, cardiovascular, pulmonary, gi and gu systems are negative, except as otherwise noted.         Physical Exam:     There were no vitals taken for this visit.  Estimated body mass index is 22.31 kg/m  as calculated from the following:    Height as of 5/27/20: 1.575 m (5' 2\").    Weight as of 5/27/20: 55.3 kg (122 lb).    Exam:  Constitutional: healthy, alert and no distress  Psychiatric: mentation appears normal and affect flat.   Resp: Coughing intermittently, able to speak in full sentences.      Results from the last 24 hoursNo results found for this or any previous visit (from the past 24 hour(s)).        Assessment and Plan   Mira was seen today for follow up.    Diagnoses and all orders for this visit:    Moderate persistent asthma with acute exacerbation  Patient calling today with 2 weeks history of worsening " shortness of breath, dry cough, sneezing, wheezing as well as subjective fevers.  Patient of note had escalation of his asthma meds a few weeks ago and was expecting improvement in his symptoms.  He does have allergy and exercise as triggers for his asthma.  Fevers now resolved.  Patient with known history of moderate persistent asthma and without a clear exposure history to infectious diseases including COVID-19.  Requested that he come in to clinic to rule this out. Recommended quarantining until test results are back.  If COVID-19 is negative, would treat as an asthma exacerbation with short course of steroids.  Patient is agreeable with plan.  Return precautions are discussed.  - Follow-up in [facility] scheduled for COVID-19 testing.    MDD with psychotic features versus possible michelle  Patient with known history of substance use disorder, currently sober, MDD with psychotic features versus possible bipolar disorder.  Currently being followed up for this at [place].  Mood issues continues to persist.  Endorses low mood, low energy at times but has periods with high energy and insomnia as well with risky behavior and rapid speech.  Will defer making any medication changes at this time and will have him follow-up [place] as planned.  He is agreeable with plan.  Last visit with his psychiatrist, he did not bring up his mood issues and states that there was a lot of time spent on discussing medication side effects from his Seroquel.  Reminded him to bring this up at next visit in order to make medication changes to help with his symptoms.  We will also try to reach out to his psychiatrist to discuss this further.    Seasonal allergic rhinitis due to pollen  -     fluticasone (FLONASE) 50 MCG/ACT nasal spray; Spray 1 spray into both nostrils daily    Refilled medications that would be required in the next 3 months.     After Visit Information:  Patient declined AVS     No follow-ups on file.    Appointment end time: 10:49  AM  This is a telephone visit that took 29 minutes.    Clinician location: [facility]  Cecy Jerry MD  I precepted today with Marcus Wnag.

## 2020-06-16 ENCOUNTER — OFFICE VISIT (OUTPATIENT)
Dept: FAMILY MEDICINE | Facility: CLINIC | Age: 25
End: 2020-06-16
Payer: COMMERCIAL

## 2020-06-16 VITALS
HEART RATE: 65 BPM | DIASTOLIC BLOOD PRESSURE: 80 MMHG | RESPIRATION RATE: 16 BRPM | TEMPERATURE: 98 F | SYSTOLIC BLOOD PRESSURE: 124 MMHG | OXYGEN SATURATION: 100 %

## 2020-06-16 DIAGNOSIS — J45.41 MODERATE PERSISTENT ASTHMA WITH EXACERBATION: ICD-10-CM

## 2020-06-16 DIAGNOSIS — J45.41 MODERATE PERSISTENT ASTHMA WITH EXACERBATION: Primary | ICD-10-CM

## 2020-06-16 ASSESSMENT — ASTHMA QUESTIONNAIRES: ACT_TOTALSCORE: 11

## 2020-06-16 NOTE — PROGRESS NOTES
Preceptor Attestation:    Patient seen and evaluated in person. I discussed the patient with the resident. I have verified the content of the note, which accurately reflects my assessment of the patient and the plan of care.   Supervising Physician:  Sanchez Hannon MD.

## 2020-06-16 NOTE — PROGRESS NOTES
S: Mira Segundo is a 25 year old male with a PMH of beta thalassemia trait, major depressive disorder, seasonal allergies, and moderate persistent asthma presenting to clinic today for in person evaluation after telephone visit yesterday.    -Patient has history of moderate persistent asthma.  Currently using his albuterol inhaler 2-3 times daily.  Takes fluticasone-salmeterol 115-21 mcg 2 puffs twice daily for his controller inhaler as well.  Had a telephone visit with Dr. Magallon yesterday.  Concern was for asthma exacerbation, potentially necessitating oral corticosteroids.  However, she wanted to rule out COVID-19 infection.  States she has been having increasing cough and some subjective dyspnea over the last 1 to 2 weeks.  Not having increased sputum production.  No fevers or chills.  Feels much improved today compared to yesterday.  Has not really been taking anything else for the cough, however he has been on the Zyrtec and Flonase to help his allergy type symptoms.  Denies significant shortness of breath currently.  Has had some mild itchiness in the throat, but no stuffy or runny nose.  States his mood is okay.  He decreased his Seroquel dosing at bedtime, sleeping well without the same side effects.    Patient Active Problem List   Diagnosis     Adjustment disorder with mixed anxiety and depressed mood     Chronic motor or vocal tic disorder     Dyshidrosis     Beta-Thalassemia Trait     Major depressive disorder, single episode, severe with psychotic features (H)     Seasonal allergic rhinitis     Paranoia (H)     Moderate persistent asthma with acute exacerbation     Current Outpatient Medications   Medication Sig Dispense Refill     acetaminophen (TYLENOL) 500 MG tablet TAKE 1-2 TABLETS (500-1,000 MG) BY MOUTH EVERY 6 HOURS AS NEEDED FOR MILD PAIN 60 tablet 1     ADVAIR HFA 45-21 MCG/ACT inhaler INHALE 2 PUFFS INTO THE LUNGS 2 TIMES DAILY 12 g 0     albuterol (PROAIR HFA/PROVENTIL HFA/VENTOLIN HFA) 108 (90  Base) MCG/ACT inhaler Inhale 2 puffs into the lungs every 6 hours as needed for shortness of breath / dyspnea 2 Inhaler 3     cetirizine (ZYRTEC) 10 MG tablet Take 1 tablet (10 mg) by mouth daily 60 tablet 3     FLUoxetine (PROZAC) 10 MG capsule Take 10 mg by mouth daily       fluticasone (FLONASE) 50 MCG/ACT nasal spray Spray 1 spray into both nostrils daily 11.1 mL 0     fluticasone-salmeterol (ADVAIR-HFA) 115-21 MCG/ACT inhaler Inhale 2 puffs into the lungs 2 times daily 12 g 1     montelukast (SINGULAIR) 10 MG tablet Take 1 tablet (10 mg) by mouth At Bedtime 30 tablet 1     QUEtiapine (SEROQUEL) 200 MG tablet Take 200 mg by mouth At Bedtime       O: /80   Pulse 65   Temp 98  F (36.7  C) (Oral)   Resp 16   SpO2 100%    Constitutional:  Well nourished and in no acute distress.  Affect is fairly flat, but he is smiling and interactive throughout the exam.  Eyes: EOMI.  No scleral icterus noted. PERRL.  Head: Atraumatic, normocephalic.  ENT/Mouth: TMs normal color and landmarks faint/trace amount of fluid behind the TMs bilaterally; nasopharynx pink and moist; oropharynx pink and moist without erythema or exudates.  Neck: Supple without cervical or supraclavicular lymphadenopathy.  CV:  RRR  - no murmurs noted.   Respiratory:  CTAB, no wheezes or crackles noted, good air entry in the posterior thorax.  No increased work of breathing.  I really do not appreciate any wheezes.  GI/Abdomen: Soft, nontender, no masses, bowel sounds are present.  Integument: There is a very fine, maculopapular, scattered rash over both upper extremities, mildly hyperpigmented.  Extrem: No lower extremity edema.  The calves are nontender bilaterally.  Psych: As noted, affect somewhat flattened, but pleasant, in no acute distress.     Assessment and Plan:  Diagnoses and all orders for this visit:    Moderate persistent asthma with exacerbation  -This is a 25-year-old male with history of moderate persistent asthma, concern over  the last 1 to 2 weeks of asthma exacerbation, coming in today for in-person follow-up and testing for COVID-19 per his primary physician.  He actually appears quite well here today.  Only coughed once while I was in the room.  Lungs are clear.  SPO2 100% on room air, not tachypneic, afebrile, heart rate in the 60s.  Has been using his ICS/LABA, as well as Singulair, I am not sure what dose of Advair he is taking as it looks like he has a couple prescribed.  Would recommend him being on the 115-21 mcg 2 puffs twice daily at least for now.  Can continue to use the albuterol as needed.  I do not see a clear indication to put him on a prednisone burst today as he has no wheezing, no respiratory distress, and SPO2 100% on room air, and will leave that to his primary pending their phone conversation scheduled for 6/18.  I did perform the COVID-19 PCR swab today and stated that he would get a call with results.  Discussed that if he develops a fever, increased work of breathing, worsening cough, or any other new or concerning symptoms, he should call back right away.  States his mood is stable on the lower dose of Seroquel at bedtime.  -     COVID-19 Virus PCR F ([facility])    RTC on 6/18 for follow-up telephone visit with Dr. Magallon.    The patient was discussed and evaluated with Dr. Parvez MD.    Agustin Schumacher, PGY-3  [facility]  Pager:  [phone]

## 2020-06-17 LAB
COVID-19 VIRUS PCR TO U OF MN - SOURCE: NORMAL
SARS-COV-2 RNA SPEC QL NAA+PROBE: NOT DETECTED

## 2020-06-18 ENCOUNTER — VIRTUAL VISIT (OUTPATIENT)
Dept: FAMILY MEDICINE | Facility: CLINIC | Age: 25
End: 2020-06-18
Payer: COMMERCIAL

## 2020-06-18 VITALS — HEIGHT: 62 IN | BODY MASS INDEX: 22.45 KG/M2 | WEIGHT: 122 LBS

## 2020-06-18 DIAGNOSIS — J45.41 MODERATE PERSISTENT ASTHMA WITH EXACERBATION: Primary | ICD-10-CM

## 2020-06-18 ASSESSMENT — MIFFLIN-ST. JEOR: SCORE: 1417.64

## 2020-06-18 NOTE — RESULT ENCOUNTER NOTE
Negative test result was discussed with the patient via Dr. Jerry in clinic today.  No further action needed at this time.    Dr. Schumacher

## 2020-06-18 NOTE — PROGRESS NOTES
"Family Medicine Telephone Visit Note         Telephone Visit Consent   Patient was verbally read the following and verbal consent was obtained.    \"Telephone visits are billed at different rates depending on your insurance coverage. During this emergency period, for some insurers they may be billed the same as an in-person visit.  Please reach out to your insurance provider with any questions.  If during the course of the call the physician/provider feels a telephone visit is not appropriate, you will not be charged for this service.\"    Name person giving consent:  Patient   Date verbal consent given:  6/18/2020  Time verbal consent given:  11:03 AM    Chief Complaint   Patient presents with     RECHECK     f/u from last time, feeling a little better, ACT score-14     Due to patient being non-English speaking/uses sign language, an  was used for this visit. Only for face-to-face interpretation by an external agency, date and length of interpretation can be found on the scanned worksheet.     name: Steve TruongWilmer) Hafsa  Language: Norma  Agency:  [employer]  Phone number: [phone]  Type of interpretation:  Telephone, spoken         HPI   Patients name: Mira    Appointment start time:  11:12 AM    Asthma  Recent asthma exacerbation preceded by playing soccer outdoors. Was using albuterol inhaler 2-3x/day. Seen by Dr. Shcumacher in person on 6/16/2020. Appeared well and O2 sat was 100%. Decided against prednisone burst. Pt endorses sore throat that waxes/wanes, mild SOB, and a cough that has improved. Cough is dry with no sputum production.   Does not at any time feel so short of breath that he can't finish a sentence. No fever or chills. He is taking Zyrtec and Flonase for cough. Currently using albuterol 3-4x/wk. Continues using fluticasone/salmeterol 115-21 mcg 2 puffs 2x/day for controller as well as Montelukast 10 mg tablet. Covid test was negative. Asthma control test today 14 but improved from 11 " on the 15th.     Mental health  Patient continues to hear voices but they come and go and are stable. He has been feeling down but is getting better. 3-4 days of the last 7 have been sad days. Has had no thoughts of suicide or other self-harm. On initial interview reported that he hadn't been going to his weekly therapy lately, but on second interview says he has been going (tele). He reported on the initial interview that sometimes the questions they ask him make him feel pressured and uncomfortable. He does report that it's helping some and giving him some coping skills. PHQ2 was negative, at last visit his PHQ9 was 19.     Continues to sleep well on his decreased nighttime seroquel dose.    Current Outpatient Medications   Medication Sig Dispense Refill     acetaminophen (TYLENOL) 500 MG tablet TAKE 1-2 TABLETS (500-1,000 MG) BY MOUTH EVERY 6 HOURS AS NEEDED FOR MILD PAIN 60 tablet 1     ADVAIR HFA 45-21 MCG/ACT inhaler INHALE 2 PUFFS INTO THE LUNGS 2 TIMES DAILY 12 g 0     albuterol (PROAIR HFA/PROVENTIL HFA/VENTOLIN HFA) 108 (90 Base) MCG/ACT inhaler Inhale 2 puffs into the lungs every 6 hours as needed for shortness of breath / dyspnea 2 Inhaler 3     cetirizine (ZYRTEC) 10 MG tablet Take 1 tablet (10 mg) by mouth daily 60 tablet 3     FLUoxetine (PROZAC) 10 MG capsule Take 10 mg by mouth daily       fluticasone (FLONASE) 50 MCG/ACT nasal spray Spray 1 spray into both nostrils daily 11.1 mL 0     fluticasone-salmeterol (ADVAIR-HFA) 115-21 MCG/ACT inhaler Inhale 2 puffs into the lungs 2 times daily 12 g 1     montelukast (SINGULAIR) 10 MG tablet Take 1 tablet (10 mg) by mouth At Bedtime 30 tablet 1     QUEtiapine (SEROQUEL) 200 MG tablet Take 200 mg by mouth At Bedtime       Allergies   Allergen Reactions     Nkda [No Known Drug Allergies]           Review of Systems:     Constitutional, HEENT, cardiovascular, pulmonary, gi and gu systems are negative, except as otherwise noted.         Physical Exam:     Ht  "1.575 m (5' 2\")   Wt 55.3 kg (122 lb)   BMI 22.31 kg/m    Estimated body mass index is 22.31 kg/m  as calculated from the following:    Height as of this encounter: 1.575 m (5' 2\").    Weight as of this encounter: 55.3 kg (122 lb).    Exam:  Constitutional: healthy, alert and no distress  Psychiatric: mentation appears normal and affect flat.   Resp: Coughing intermittently, able to speak in full sentences.      Results from the last 24 hoursNo results found for this or any previous visit (from the past 24 hour(s)).        Assessment and Plan     Lo was seen today for recheck.    Diagnoses and all orders for this visit:    Moderate persistent asthma with exacerbation  Pt is much improved from last week as far as asthma symptoms. The medication changes (increased dosage of Advair + addition of Montelukast), were made ~2 weeks ago. Do not recommend further changes at this time.   - If sx continue to worsen or has further exacerbations, recommend allergy testing.  - Will continue with current teletherapy and current psychiatrist. Advised that if he is uncomfortable with current situation we can consider different therapist or different type of therapy.   - Follow-up in 1 month unless sx fail to improve or worsen before then, in which case he will call the clinic.     No follow-ups on file.    Appointment end time: 11:47 AM  This is a telephone visit that took 35 minutes.    Clinician location: [facility]    Babita Millan, MS4.    I was present with the medical student who participated in the service and in the documentation of this note. I have verified the history and personally performed the physical exam and medical decision making, and have verified the content of the note, which accurately reflects my assessment of the patient and the plan of care.    I, Cecy Jerry, have discussed patient findings with attending physician Allen Marks MD. who was agreeable with plan.     Cecy Jerry MD    "

## 2020-06-19 ASSESSMENT — ASTHMA QUESTIONNAIRES: ACT_TOTALSCORE: 14

## 2020-07-14 DIAGNOSIS — F32.3 MAJOR DEPRESSIVE DISORDER, SINGLE EPISODE, SEVERE WITH PSYCHOTIC FEATURES (H): ICD-10-CM

## 2020-07-14 DIAGNOSIS — Z00.00 PREVENTATIVE HEALTH CARE: ICD-10-CM

## 2020-07-14 RX ORDER — CETIRIZINE HYDROCHLORIDE 10 MG/1
TABLET ORAL
Qty: 60 TABLET | Refills: 3 | Status: SHIPPED | OUTPATIENT
Start: 2020-07-14 | End: 2021-02-18

## 2020-07-14 RX ORDER — ACETAMINOPHEN 500 MG
500-1000 TABLET ORAL EVERY 6 HOURS PRN
Qty: 60 TABLET | Refills: 1 | Status: SHIPPED | OUTPATIENT
Start: 2020-07-14 | End: 2020-10-05

## 2020-07-17 ENCOUNTER — VIRTUAL VISIT (OUTPATIENT)
Dept: FAMILY MEDICINE | Facility: CLINIC | Age: 25
End: 2020-07-17
Payer: COMMERCIAL

## 2020-07-17 DIAGNOSIS — J45.41 MODERATE PERSISTENT ASTHMA WITH ACUTE EXACERBATION: ICD-10-CM

## 2020-07-17 RX ORDER — MONTELUKAST SODIUM 10 MG/1
10 TABLET ORAL AT BEDTIME
Qty: 30 TABLET | Refills: 1 | Status: SHIPPED | OUTPATIENT
Start: 2020-07-17 | End: 2020-09-14

## 2020-07-17 RX ORDER — ALBUTEROL SULFATE 90 UG/1
2 AEROSOL, METERED RESPIRATORY (INHALATION) EVERY 6 HOURS PRN
Qty: 2 INHALER | Refills: 3 | Status: SHIPPED | OUTPATIENT
Start: 2020-07-17 | End: 2021-02-18

## 2020-07-17 RX ORDER — PREDNISONE 50 MG/1
50 TABLET ORAL DAILY
Qty: 5 TABLET | Refills: 0 | Status: SHIPPED | OUTPATIENT
Start: 2020-07-17 | End: 2020-07-22

## 2020-07-17 RX ORDER — FLUTICASONE PROPIONATE AND SALMETEROL XINAFOATE 115; 21 UG/1; UG/1
2 AEROSOL, METERED RESPIRATORY (INHALATION) 2 TIMES DAILY
Qty: 12 G | Refills: 1 | Status: CANCELLED | OUTPATIENT
Start: 2020-07-17

## 2020-07-17 RX ORDER — FLUTICASONE PROPIONATE AND SALMETEROL XINAFOATE 230; 21 UG/1; UG/1
2 AEROSOL, METERED RESPIRATORY (INHALATION) 2 TIMES DAILY
Qty: 1 INHALER | Refills: 3 | Status: SHIPPED | OUTPATIENT
Start: 2020-07-17 | End: 2020-09-14

## 2020-07-17 ASSESSMENT — PATIENT HEALTH QUESTIONNAIRE - PHQ9: SUM OF ALL RESPONSES TO PHQ QUESTIONS 1-9: 22

## 2020-07-17 NOTE — PROGRESS NOTES
"Family Medicine Telephone Visit Note         Telephone Visit Consent   Patient was verbally read the following and verbal consent was obtained.    \"Telephone visits are billed at different rates depending on your insurance coverage. During this emergency period, for some insurers they may be billed the same as an in-person visit.  Please reach out to your insurance provider with any questions.  If during the course of the call the physician/provider feels a telephone visit is not appropriate, you will not be charged for this service.\"    Name person giving consent:  Patient   Date verbal consent given:  7/17/2020  Time verbal consent given:  11:16 AM    Chief Complaint   Patient presents with     RECHECK     pt wants stronger medication for his inhailer     Due to patient being non-English speaking/uses sign language, an  was used for this visit. Only for face-to-face interpretation by an external agency, date and length of interpretation can be found on the scanned worksheet.     name: Steve TruongWilmer) Hafsa  Language: Norma  Agency:  [employer]  Phone number: [phone]  Type of interpretation:  Telephone, spoken           HPI   Patients name: Mira  Appointment start time:  11:59 AM    This is my first visit with this pt, for whom I am now the PCP.    Pt feels that his asthma is acting up this summer, for  the past month and half approximately.  Due to his symptoms, he is unable to exercise or be out doing anything fun.  Symptoms include coughing, shortness of breath, wheezing, and chest tightness.  He is having these exacerbations 3 to 4 times daily. He uses rescue inhaler daily, often requiring up to  6 puffs to start breathing well.  Pt reports that he aha been taking his inhalers and medications as prescribed daily.  He did recently run out of singulair tablets.      Medication list  as below includes Advair with 2 different strengths 45-21 and  115-21  2 puffs BID.   Pt checked his inhalers at " "home and confirms that he has been taking the 45/21 advair and the 115/21 daily - interchangeably.    Pt has been struggling to manage his asthma for many years and does not recall ever having it very well-controlled with few to no exacerbations.  He knows it has been better in the past, but it certainly has worsened in the past month and half.    Pt denies palpitations, HA, fever, chills, loss of appetite.  He is frustrated about the severity of his asthma symptoms.      Current Outpatient Medications   Medication Sig Dispense Refill     acetaminophen (TYLENOL) 500 MG tablet TAKE 1-2 TABLETS (500-1,000 MG) BY MOUTH EVERY 6 HOURS AS NEEDED FOR MILD PAIN 60 tablet 1     ADVAIR HFA 45-21 MCG/ACT inhaler INHALE 2 PUFFS INTO THE LUNGS 2 TIMES DAILY 12 g 0     albuterol (PROAIR HFA/PROVENTIL HFA/VENTOLIN HFA) 108 (90 Base) MCG/ACT inhaler Inhale 2 puffs into the lungs every 6 hours as needed for shortness of breath / dyspnea 2 Inhaler 3     cetirizine (ZYRTEC) 10 MG tablet TAKE 1 TABLET (10 MG) BY MOUTH DAILY FOR ALLERGIES 60 tablet 3     FLUoxetine (PROZAC) 10 MG capsule Take 10 mg by mouth daily       fluticasone (FLONASE) 50 MCG/ACT nasal spray Spray 1 spray into both nostrils daily 11.1 mL 0     fluticasone-salmeterol (ADVAIR-HFA) 115-21 MCG/ACT inhaler Inhale 2 puffs into the lungs 2 times daily 12 g 1     montelukast (SINGULAIR) 10 MG tablet Take 1 tablet (10 mg) by mouth At Bedtime 30 tablet 1     QUEtiapine (SEROQUEL) 200 MG tablet Take 200 mg by mouth At Bedtime       Allergies   Allergen Reactions     Nkda [No Known Drug Allergies]             Review of Systems:     Constitutional, HEENT, cardiovascular, pulmonary, gi and gu systems are negative, except as otherwise noted.         Physical Exam:     There were no vitals taken for this visit.  Estimated body mass index is 22.31 kg/m  as calculated from the following:    Height as of 6/18/20: 1.575 m (5' 2\").    Weight as of 6/18/20: 55.3 kg (122 " lb).    Exam:  Constitutional: healthy, alert and no distress  Psychiatric: mentation appears normal and affect normal/bright        Assessment and Plan   Lo was seen today for recheck.    Diagnoses and all orders for this visit:    Moderate persistent asthma with acute exacerbation  Comments:  Worsening symptoms for the past month and half. Pt was out of singular tablet and has been taking lower dose advair occasionally.  Instructed to throw it away.  Orders:  -     montelukast (SINGULAIR) 10 MG tablet; Take 1 tablet (10 mg) by mouth At Bedtime  -     fluticasone-salmeterol (ADVAIR-HFA) 230-21 MCG/ACT inhaler; Inhale 2 puffs into the lungs 2 times daily   - discontinue lower strength doses        - discontinue flovent   -     predniSONE (DELTASONE) 50 MG tablet; Take 1 tablet (50 mg) by mouth daily for 5 days  -     albuterol (PROAIR HFA/PROVENTIL HFA/VENTOLIN HFA) 108 (90 Base) MCG/ACT inhaler; Inhale 2 puffs into the lungs every 6 hours as needed for shortness of breath / dyspnea or wheezing    Refilled medications that would be required in the next 3 months.     Follow up: Return in about 4 days (around 7/21/2020) for In-Clinic Visit.    After Visit Information: Patient declined AVS   Appointment end time: 12:29 PM.  This is a telephone visit that took 30 minutes.  Clinician location:  [facility]     Felisa Sutton MD  I precepted today with Dr. Agustin Coy.

## 2020-07-17 NOTE — PROGRESS NOTES
Preceptor Attestation:   I talked to the patient on the phone. I discussed the patient with the resident. I have verified the content of the note, which accurately reflects my assessment of the patient and the plan of care.   Supervising Physician:  Agustin Coy MD.

## 2020-07-30 ENCOUNTER — DOCUMENTATION ONLY (OUTPATIENT)
Dept: PSYCHOLOGY | Facility: CLINIC | Age: 25
End: 2020-07-30

## 2020-07-30 ENCOUNTER — OFFICE VISIT (OUTPATIENT)
Dept: FAMILY MEDICINE | Facility: CLINIC | Age: 25
End: 2020-07-30
Payer: COMMERCIAL

## 2020-07-30 VITALS
DIASTOLIC BLOOD PRESSURE: 74 MMHG | TEMPERATURE: 98.1 F | WEIGHT: 130.2 LBS | HEIGHT: 60 IN | BODY MASS INDEX: 25.56 KG/M2 | HEART RATE: 74 BPM | RESPIRATION RATE: 16 BRPM | OXYGEN SATURATION: 99 % | SYSTOLIC BLOOD PRESSURE: 111 MMHG

## 2020-07-30 DIAGNOSIS — G89.29 CHRONIC BILATERAL LOW BACK PAIN WITHOUT SCIATICA: ICD-10-CM

## 2020-07-30 DIAGNOSIS — M54.50 CHRONIC BILATERAL LOW BACK PAIN WITHOUT SCIATICA: ICD-10-CM

## 2020-07-30 DIAGNOSIS — J45.40 MODERATE PERSISTENT ASTHMA WITHOUT COMPLICATION: Primary | ICD-10-CM

## 2020-07-30 DIAGNOSIS — F33.3 SEVERE RECURRENT MAJOR DEPRESSION W/PSYCHOTIC FEATURES, MOOD-CONGRUENT (H): ICD-10-CM

## 2020-07-30 PROBLEM — J45.41 MODERATE PERSISTENT ASTHMA WITH ACUTE EXACERBATION: Status: RESOLVED | Noted: 2020-06-15 | Resolved: 2020-07-30

## 2020-07-30 ASSESSMENT — ENCOUNTER SYMPTOMS
FEVER: 0
PALPITATIONS: 0
CHILLS: 0
WHEEZING: 1
COUGH: 1
HEMATURIA: 0
SORE THROAT: 0
SHORTNESS OF BREATH: 1
ABDOMINAL PAIN: 0
ARTHRALGIAS: 0
BACK PAIN: 1
VOMITING: 0
DYSURIA: 0
SEIZURES: 0
COLOR CHANGE: 0

## 2020-07-30 ASSESSMENT — PATIENT HEALTH QUESTIONNAIRE - PHQ9: SUM OF ALL RESPONSES TO PHQ QUESTIONS 1-9: 15

## 2020-07-30 ASSESSMENT — MIFFLIN-ST. JEOR: SCORE: 1423.08

## 2020-07-30 NOTE — NURSING NOTE
Due to patient being non-English speaking/uses sign language, an  was used for this visit. Only for face-to-face interpretation by an external agency, date and length of interpretation can be found on the scanned worksheet.     name: Steve Pereyra  Agency: [employer]  Language: Norma   Telephone number: [phone]  Type of interpretation: Telephone, spoken

## 2020-07-30 NOTE — ASSESSMENT & PLAN NOTE
"Pt reports that he has had lower back pain for a \"long time\" and worsened with sitting for prolonged periods, walking, or exercise.  Has only tried tylenol, which doesn't help much.  -  PT - referral placed 7/30/20 - patient is willing to try  "

## 2020-07-30 NOTE — Clinical Note
Neil Mobley,  This Norma-speaking pt is in need of PT for lower back pain.  I just picked a location at random, can send pt any facility that's willing to start therapy soon.  Thanks.  Verified phone #.    -Felisa Sutton MD

## 2020-07-30 NOTE — PATIENT INSTRUCTIONS
07/30/20   MENTAL HEALTH REFERRAL    Mental Health referral routed to behavioral health team for recommendations. See Documentation Only encounter for more information.    Leandra Beckford    8/3/2020  PHYSICAL THERAPY REFERRAL   [facility]  Phone: [phone]  Fax: [phone]    Demographics and referral faxed to [phone]. They will contact patient to schedule.     ALEKSANDR Gómez

## 2020-07-30 NOTE — PROGRESS NOTES
Behavioral Health Team,    Patient is being referred for mental health services by their provider, Dr. Sutton.  Please advise if we are able to see patient for in house treatment or if a community option would be best.    Thank you,    Leandra Beckford  7/30/2020

## 2020-07-30 NOTE — Clinical Note
Neil Palma,  This is the young man I talked to you about. He has a h/o major depression with psychotic features and sees Dr. Isaac Flores at [facility] who does Rx for ssri & antipsychotics.  Pt interested in therapy/ counseling.  MH referral placed.  Needs Norma .  Felisa Sutton MD

## 2020-07-30 NOTE — PROGRESS NOTES
[facility]         YRIS Segundo is a 25 year old  male with a PMH significant for Chronic motor or vocal tic disorder; Paranoia (H); Severe recurrent major depression w/psychotic features, mood-congruent (H); and Moderate persistent asthma without complication who presents to clinic today for:     Chief Complaint   Patient presents with     Follow Up     Asthma     Refill Request      predniSONE (DELTASONE) 50 MG tablet       Recheck Medication     aripiprazole and benztropine mes     Pt has lower back pain and wants medication refills.  Lower back pain is chronic has had it for a long time. When standing for long time, sits for along time, plays sports, or even exercises brings on the pain.  Has been using Tylenol for the pain for pain which doesn't help much. When asked about injury or trauma to the lower back, pt denies.   Has pain in lower back  When walking.  Used to have numbness and tingling down leg in 1.5 yrs.  Denies incontinence.  Sometimes pain travels up the back.    Asthma is improved a bit since last visit.  Still having attacks at night. ACT score is 9.  Has been using stronger advair 2puffs bid.  And also has been taking Montelukast 10mg tablet nightly.   We reviewed teaching for asthma inhaler w/o showback due to COVID.       Mood/ depression has been improved, but pt still struggling with depression symptoms.  Plays music, watch movies as stress relief.  Has good support with friends and family that he feels that he can talk to about his mood.   Would be open to psychotheapy/ counseling.     Norma  assisted with this visit - Steve Pereyra.  PMH, Medications and Allergies were reviewed and updated as needed.        REVIEW OF SYSTEMS     Review of Systems   Constitutional: Negative for chills and fever.   HENT: Negative for sore throat.    Respiratory: Positive for cough, shortness of breath and wheezing.    Cardiovascular: Negative for chest pain and  palpitations.   Gastrointestinal: Negative for abdominal pain and vomiting.   Genitourinary: Negative for dysuria and hematuria.   Musculoskeletal: Positive for back pain. Negative for arthralgias.   Skin: Negative for color change and rash.   Neurological: Negative for seizures and syncope.   All other systems reviewed and are negative.        OBJECTIVE     Blood pressure 111/74, pulse 74, temperature 98.1  F (36.7  C), temperature source Oral, resp. rate 16, height 1.524 m (5'), weight 59.1 kg (130 lb 3.2 oz), SpO2 99 %.   Body mass index is 25.43 kg/m .    Physical Exam  Constitutional:       General: He is not in acute distress.     Appearance: He is not ill-appearing.   HENT:      Head: Normocephalic and atraumatic.      Mouth/Throat:      Mouth: Mucous membranes are moist.   Eyes:      General: No scleral icterus.     Extraocular Movements: Extraocular movements intact.      Conjunctiva/sclera: Conjunctivae normal.   Cardiovascular:      Rate and Rhythm: Normal rate and regular rhythm.      Pulses: Normal pulses.      Heart sounds: No murmur. No friction rub. No gallop.    Pulmonary:      Effort: Pulmonary effort is normal. No respiratory distress.      Breath sounds: Normal breath sounds. No stridor. No wheezing or rhonchi.   Abdominal:      General: Bowel sounds are normal. There is no distension.      Palpations: Abdomen is soft.      Tenderness: There is no abdominal tenderness. There is no guarding.   Musculoskeletal: Normal range of motion.         General: No deformity.      Lumbar back: He exhibits tenderness. He exhibits no edema and no deformity.      Comments: TTP in SI joints bilaterally   Skin:     General: Skin is warm and dry.      Findings: No lesion or rash.   Neurological:      General: No focal deficit present.      Mental Status: He is alert and oriented to person, place, and time.   Psychiatric:         Attention and Perception: Attention normal.         Mood and Affect: Mood normal.  "Affect is flat.         Speech: Speech normal.         Behavior: Behavior normal.         PHQ 6/15/2020 7/17/2020 7/30/2020   PHQ-9 Total Score 19 22 15   Q9: Thoughts of better off dead/self-harm past 2 weeks Not at all Not at all Several days   F/U: Thoughts of suicide or self-harm - - -   F/U: Self harm-plan - - -   F/U: Self-harm action - - -   F/U: Safety concerns - - -     ACT Total Scores 6/15/2020 6/18/2020 7/30/2020   ACT TOTAL SCORE - - -   ASTHMA ER VISITS - - -   ASTHMA HOSPITALIZATIONS - - -   ACT TOTAL SCORE (Goal Greater than or Equal to 20) 11 14 9   In the past 12 months, how many times did you visit the emergency room for your asthma without being admitted to the hospital? 0 0 0   In the past 12 months, how many times were you hospitalized overnight because of your asthma? 0 0 -       ASSESSMENT AND PLAN     Mira is a 25 year old male who was seen today for follow up, refill request and recheck medication.  Most recent visit was one week ago for asthma (virtual visit). Today, we addressed the following:    Severe recurrent major depression w/psychotic features, mood-congruent (H)  Goes to [facility]. Sees Dr. Isaac Flores for psych, who is prescribing his psychiatry meds (fluoxetine, Seroquel, benztropine, aripiprazole). Pt takes all the meds as prescribed. PHQ9 score of 15 7/30/20.  Pt reports mood is improved, but still a problem for him. Has been taking all meds as prescribed.  SHANTAL from [facility].  - Mental Health referral placed 7/30/20    Moderate persistent asthma without complication  Pt currently on advair high strength 230-41 2 puffs BID since July 2020 with improvement,  But still has frequent exacerbations. ACT score 7/30/20 is 9.  May need referrall for further assessment of breathing symptoms.      Chronic bilateral low back pain without sciatica  Pt reports that he has had lower back pain for a \"long time\" and worsened with sitting for prolonged periods, walking, or exercise.  Has only tried " tylenol, which doesn't help much.  -  PT - referral placed 7/30/20 - patient is willing to try      Follow-up: Return in about 4 weeks (around 8/27/2020).    The patient was seen by, and discussed with, Dr. Lamont Kearney who agrees with the plan.    -----  Felisa Sutton MD  PGY-2  [facility]  Office: [phone]  Pager: [phone]

## 2020-07-30 NOTE — ASSESSMENT & PLAN NOTE
Pt currently on advair high strength 230-41 2 puffs BID since July 2020 with improvement,  But still has frequent exacerbations. ACT score 7/30/20 is 9.  May need referrall for further assessment of breathing symptoms.

## 2020-07-30 NOTE — PROGRESS NOTES
Preceptor Attestation:    Patient seen and evaluated in person. I discussed the patient with the resident. I have verified the content of the note, which accurately reflects my assessment of the patient and the plan of care.   Supervising Physician:  Lamont Kearney MD.

## 2020-07-30 NOTE — ASSESSMENT & PLAN NOTE
Goes to [facility]. Sees Dr. Isaac Flores for psych, who is prescribing his psychiatry meds (fluoxetine, Seroquel, benztropine, aripiprazole). Pt takes all the meds as prescribed. PHQ9 score of 15 7/30/20.  Pt reports mood is improved, but still a problem for him. Has been taking all meds as prescribed.  SHANTAL from [facility].  - Mental Health referral placed 7/30/20

## 2020-08-04 ENCOUNTER — AMBULATORY - HEALTHEAST (OUTPATIENT)
Dept: ADMINISTRATIVE | Facility: REHABILITATION | Age: 25
End: 2020-08-04

## 2020-08-04 DIAGNOSIS — M54.50 CHRONIC BILATERAL LOW BACK PAIN WITHOUT SCIATICA: ICD-10-CM

## 2020-08-04 DIAGNOSIS — G89.29 CHRONIC BILATERAL LOW BACK PAIN WITHOUT SCIATICA: ICD-10-CM

## 2020-08-05 NOTE — PROGRESS NOTES
Review of Dr. Sutton's order and note as well as in-person discussion with Dr. Sutton indicates that this is a referral for psychotherapy for treatment of depressed mood. Patient already has a psychiatrist at [facility] so it would make sense to try to get him connected with a therapist there as well. Appreciate Dr. Sutton getting an SHANTAL for [facility] already.   Will ask SW to help coordinate a psychotherapy intake at [facility]. If [facility] does not work, could consider the other options below:     [address] [address]  [phone]  COVID-19 UPDATE 03-: [facility] is not doing any face to face visits in their clinics until at least 3/31/2020 and potentially beyond that depending on the current situation. They are not accepting new referrals either. They will continue telehealth visits for those who were already receiving their cares that way. They are not starting new telehealth visits at this time.     [facility], [address]  [place], [place]   [address]  Office:   [phone]  Fax:   [phone]  COVID-19 UPDATE 03-: ACCEPTING NEW REFERRALS FOR ARMHS, THERAPY, ASSESSMENTS & GAMBLING! *NIGHTS & WEEKENDS AVAILABLE.  In order to accommodate everyone's safety during the COVID-19 situation, all services will be provided via TeleHealth.  FOR CLIENT REFERRALS, CONTACT PATRICIA RICH at [phone] or [email or web address].    [facility]  [address] [place] [address]  [phone]  Fax:  [phone]  [email or web address]   There is an online referral form.    Bilingual therapists:   * Donna Alvarez MSW, LGSW:  Fluent in English Sgaw Norma, Vinicius and conversational German   * Griselda Marin MA, MSW, LGSW, LADC: English, Syriac, Franci and Tibetan   * JIN Kunz, LICSW:  English, Hmong and conversational Guamanian  COVID-19 UPDATE 03-: [facility] is still  operating as business per usual, at this time. They have telehealth options for people who would rather do that. Clients should call their office to arrange this.    [facility]   [address]  [place] [address]  [phone]    [address]  [place] [address]  [phone]    Services/Specialties:  Hmong Speaking clinicians  Neuropsychological & Psychological Assessments  Outpatient Therapy (Children & Adults)  Group Therapy  Autism Spectrum Disorder  Fetal Alcohol Spectrum Disorder  DBT    Multicultural Psychology / Refugees & Immigrants  COVID-19 UPDATE 03-: Closed from Wednesday, March 18th through Monday, March 27th. Our administrative office will remain open;  however, there will be no in-person appointments available.    [facility]  [address]   [place], [place] [address]  [phone]  COVID-19 UPDATE 03-:  [facility] has changed [facility] visits to telehealth (doing FaceTime or video chat with clients), no in person [facility]. Office based visits are still happening as business per usual for the time being. They anticipate this will change in the coming days.      [facility]  [address] [place] [address]  Main Phone: [phone]  Fax: [phone]  [facility] / [phone]  COVID-19 UPDATE 03-: [facility] is closed and not accepting new referrals at this time nor are providing services via telephonically or virtually until April.       [facility]  [address]  [address]  [place] [address]  [phone]  COVID-19 UPDATE 03-: [facility] agreed to not be in [facility]. Will see patients at their location in person but are meeting with most people via phone. They will bill non-billable when using telehealth services.           If patient will be seen by in-house  provider, will ask our referral team to  "screen for telehealth barriers at time of follow up so that we can identify who may benefit from use of telehealth hub at [facility].  Help for patients who will be scheduled in the community, but need telehealth support will be managed on a case by case basis.    Do you have access to a computer with a webcam or smartphone?   Do you have access to the internet?   Do you have a safe and private space for this conversation?     If the patient answers \"no\" to any of these questions, referral team will engage patient in conversation about whether patient would like to access services via [facility]'s telehealth hub.  This would ensure access while mitigating risk by limiting time in face to face contact with provider (safter if under 15 minutes of exposure in close space, etc.).  If so, this will need to be coordinated and documented on [facility] calendar.    Let me know if you have questions or would like additional follow up from me.  Thanks!        Teresita Palma, PhD,   Behavioral Health Fellow     Disclaimer  The above treatment recommendations are based on consultation with the patient's primary care provider and a review of relevant information in EPIC.? I have not personally examined the patient.? All recommendations should be implemented with considerations of the patient's relevant prior history and current clinical status.  Please contact me with any questions about the care of this patient.    "

## 2020-08-06 NOTE — PROGRESS NOTES
This SW reviewed patient's chart as they have worked closely with him in the past. He did have a therapist at [facility] about 1 year ago, [facility], L[facility]- they are listed in the care teams. SW will ask if patient wants to re establish there.     This SW utilized [facility] [employer] [facility]  to reach out to Mira Segundo. SW was able to connect with him and discuss the referral. He states that he does not want to go back to [facility]. He does recall having met with a therapist there for some time but states it's been quite a while and he'd rather not return there. He does plan to keep his psychiatrist there, however.     SW offered up resources for [facility] and [facility] and provided a brief overview of each agency. Mira Segundo opted to go with [facility]. SW agreed to facilitate the referral to [facility].     Due to SW working remotely today, they will complete the referral and fax to [facility] tomorrow, Friday, August 7th.     RAYNA Jasmine

## 2020-08-19 ENCOUNTER — OFFICE VISIT - HEALTHEAST (OUTPATIENT)
Dept: PHYSICAL THERAPY | Facility: REHABILITATION | Age: 25
End: 2020-08-19

## 2020-08-19 DIAGNOSIS — M54.50 CHRONIC MIDLINE LOW BACK PAIN WITHOUT SCIATICA: ICD-10-CM

## 2020-08-19 DIAGNOSIS — M62.81 GENERALIZED MUSCLE WEAKNESS: ICD-10-CM

## 2020-08-19 DIAGNOSIS — G89.29 CHRONIC MIDLINE LOW BACK PAIN WITHOUT SCIATICA: ICD-10-CM

## 2020-08-19 DIAGNOSIS — M25.69 DECREASED RANGE OF MOTION OF TRUNK AND BACK: ICD-10-CM

## 2020-08-24 ENCOUNTER — OFFICE VISIT (OUTPATIENT)
Dept: FAMILY MEDICINE | Facility: CLINIC | Age: 25
End: 2020-08-24
Payer: COMMERCIAL

## 2020-08-24 VITALS
RESPIRATION RATE: 12 BRPM | WEIGHT: 128.8 LBS | OXYGEN SATURATION: 96 % | HEART RATE: 80 BPM | BODY MASS INDEX: 25.15 KG/M2 | DIASTOLIC BLOOD PRESSURE: 83 MMHG | SYSTOLIC BLOOD PRESSURE: 119 MMHG | TEMPERATURE: 98.4 F

## 2020-08-24 DIAGNOSIS — G89.29 CHRONIC BILATERAL LOW BACK PAIN WITHOUT SCIATICA: ICD-10-CM

## 2020-08-24 DIAGNOSIS — J45.40 MODERATE PERSISTENT ASTHMA WITHOUT COMPLICATION: Primary | ICD-10-CM

## 2020-08-24 DIAGNOSIS — Z72.0 TOBACCO ABUSE: ICD-10-CM

## 2020-08-24 DIAGNOSIS — M54.50 CHRONIC BILATERAL LOW BACK PAIN WITHOUT SCIATICA: ICD-10-CM

## 2020-08-24 ASSESSMENT — ENCOUNTER SYMPTOMS
HEMATURIA: 0
HEADACHES: 0
RHINORRHEA: 0
COLOR CHANGE: 0
DYSURIA: 0
ARTHRALGIAS: 0
VOMITING: 0
FEVER: 0
DIZZINESS: 0
BACK PAIN: 1
ABDOMINAL PAIN: 0
DYSPHORIC MOOD: 0
NERVOUS/ANXIOUS: 1
SORE THROAT: 0
SEIZURES: 0
PALPITATIONS: 0
CHILLS: 0

## 2020-08-24 NOTE — PROGRESS NOTES
Due to patient being non-English speaking/uses sign language, an  was used for this visit. Only for face-to-face interpretation by an external agency, date and length of interpretation can be found on the scanned worksheet.     name: Steve Pereyra  Agency: [employer]  Language: Norma   Telephone number: [phone]  Type of interpretation: Telephone, spoken

## 2020-08-24 NOTE — PROGRESS NOTES
[facility]         SUBJECTIVE       Mira Segundo is a 25 year old  male with a PMH significant for Seasonal allergic rhinitis; Paranoia (H); Severe recurrent major depression w/psychotic features; Moderate persistent asthma without complication; and Chronic bilateral low back pain who presents to clinic today for:     Asthma Follow Up   Concerns since last visit, pt reports things are better with breathing overall    Description:  Cough: Yes Details: on and off  Wheezing:  Yes Details: on and off  Shortness of breath:  Yes Details: on and off  Fevers:  no  Fatigue: no    Decreased appetite:Yes Details: sometimes  Chest pain or discomfort: no  Palpitations:Yes Details: nearly every day when has asthma symptoms or when has anxiety. Has and selective serotonin reuptake inhibitor and seroquel ordered an says he takes them daily.  Not interested in higher.       How often are current asthma medications being used:                  Controller medicine daily:  Yes          Rescue nebulizer or Inhaler during an average week: 2-3 times - significant improvement from prior      Able to do usual responsibilities:  Yes Details: much improvement          Triggers: smoke, strong odors and fumes, exercise or sports and cold air.  Pt currently smokes cigarettes. Interested in quitting.  Would like chewing gum to help him quit.           If hospitalized-History of intubation: No has never been hospitalized.                                                   Asthma Control Test score for today:    Score today 14.  Last visit 7/30/20 score was 9.  ACT Total Scores 8/24/2020   ACT TOTAL SCORE -   ASTHMA ER VISITS -   ASTHMA HOSPITALIZATIONS -   ACT TOTAL SCORE (Goal Greater than or Equal to 20) 14   In the past 12 months, how many times did you visit the emergency room for your asthma without being admitted to the hospital? 0   In the past 12 months, how many times were you hospitalized overnight because of your asthma? -      Asthma Action Plan done this year?: NO - non- English speaking pt  moderate persistent      +++++++  Additionally has lower back pain daily which is a chronic issue for him.   Has been taking tylenol and started PT.   Both have been helpful.  Pt plans to continue with this plan.    [employer]  was used for  this visit.      PMH, Medications and Allergies were reviewed and updated as needed.        REVIEW OF SYSTEMS     Review of Systems   Constitutional: Negative for chills and fever.   HENT: Negative for rhinorrhea and sore throat.    Eyes: Negative for visual disturbance.   Respiratory:        See HPI above   Cardiovascular: Negative for chest pain and palpitations.   Gastrointestinal: Negative for abdominal pain and vomiting.   Genitourinary: Negative for dysuria and hematuria.   Musculoskeletal: Positive for back pain. Negative for arthralgias.        Daily back pain   Skin: Negative for color change and rash.   Neurological: Negative for dizziness, seizures, syncope and headaches.   Psychiatric/Behavioral: Negative for dysphoric mood. The patient is nervous/anxious.    All other systems reviewed and are negative.          OBJECTIVE     Blood pressure 119/83, pulse 80, temperature 98.4  F (36.9  C), temperature source Oral, resp. rate 12, weight 58.4 kg (128 lb 12.8 oz), SpO2 96 %.   Body mass index is 25.15 kg/m .    Physical Exam  Constitutional:       General: He is not in acute distress.     Appearance: He is not ill-appearing.   HENT:      Head: Normocephalic and atraumatic.      Mouth/Throat:      Mouth: Mucous membranes are moist.   Eyes:      General: No scleral icterus.     Extraocular Movements: Extraocular movements intact.      Conjunctiva/sclera: Conjunctivae normal.   Cardiovascular:      Rate and Rhythm: Normal rate and regular rhythm.      Pulses: Normal pulses.      Heart sounds: No murmur. No friction rub. No gallop.    Pulmonary:      Effort: Pulmonary effort is normal. No  respiratory distress.      Breath sounds: Normal breath sounds. No stridor. No wheezing or rhonchi.   Abdominal:      General: Bowel sounds are normal. There is no distension.      Palpations: Abdomen is soft.      Tenderness: There is no abdominal tenderness. There is no guarding.   Musculoskeletal: Normal range of motion.         General: No deformity.      Lumbar back: He exhibits tenderness. He exhibits no edema and no deformity.      Comments: TTP in SI joints bilaterally   Skin:     General: Skin is warm and dry.      Findings: No lesion or rash.   Neurological:      General: No focal deficit present.      Mental Status: He is alert and oriented to person, place, and time.   Psychiatric:         Attention and Perception: Attention normal.         Mood and Affect: Mood normal. Affect is flat.         Speech: Speech normal.         Behavior: Behavior normal.       ASSESSMENT AND PLAN     Mira Segundo is a 25 year old male who was seen today for the following:    Chronic bilateral low back pain without sciatica  Takes tyelonol which helps.  Also has started PT which also helps.    Tobacco abuse  Currently smokes 3 cigarettes daily.  Lists smoke as one of his asthma triggers.  - start nicotine chewing gum today and work on weaning down over time.    Moderate persistent asthma without complication  Currently taking high strength fluticasone-salmeterol (ADVAIR-HFA) 230-21 MCG/ACT inhaler  2 puffs BID  And 10mg singlair daily with improvement in symptoms. ACT 14 8/24/20; was 9 on 7/30/20.  - work on tobacco cessation  -  May consider increase #of puffs of advair or adding spiriva inhaler if symptoms do not improve  - Follow-up in 1 month      Follow-up: Return in about 1 month (around 9/24/2020) for asthma follow up.    The patient was seen by, and discussed with, Dr. Iker Wang who agrees with the plan.  -----  Felisa Sutton MD  PGY-2  [facility]  Office: [phone]  Pager: [phone]

## 2020-08-24 NOTE — PROGRESS NOTES
Preceptor Attestation:    Patient seen and evaluated in person. I discussed the patient with the resident. I have verified the content of the note, which accurately reflects my assessment of the patient and the plan of care.   Supervising Physician:  Iker Wang MD.

## 2020-08-25 PROBLEM — Z72.0 TOBACCO ABUSE: Status: ACTIVE | Noted: 2020-08-25

## 2020-08-25 NOTE — ASSESSMENT & PLAN NOTE
Currently taking high strength fluticasone-salmeterol (ADVAIR-HFA) 230-21 MCG/ACT inhaler  2 puffs BID  And 10mg singlair daily with improvement in symptoms. ACT 14 8/24/20; was 9 on 7/30/20.  - work on tobacco cessation  -  May consider increase #of puffs of advair or adding spiriva inhaler if symptoms do not improve  - Follow-up in 1 month

## 2020-08-25 NOTE — ASSESSMENT & PLAN NOTE
Currently smokes 3 cigarettes daily.  Lists smoke as one of his asthma triggers.  - start nicotine chewing gum today and work on weaning down over time.

## 2020-09-03 ENCOUNTER — TRANSFERRED RECORDS (OUTPATIENT)
Dept: HEALTH INFORMATION MANAGEMENT | Facility: CLINIC | Age: 25
End: 2020-09-03

## 2020-09-14 ENCOUNTER — VIRTUAL VISIT (OUTPATIENT)
Dept: FAMILY MEDICINE | Facility: CLINIC | Age: 25
End: 2020-09-14
Payer: COMMERCIAL

## 2020-09-14 VITALS — BODY MASS INDEX: 22.45 KG/M2 | HEIGHT: 62 IN | WEIGHT: 122 LBS

## 2020-09-14 DIAGNOSIS — J45.40 MODERATE PERSISTENT ASTHMA WITHOUT COMPLICATION: Primary | ICD-10-CM

## 2020-09-14 DIAGNOSIS — Z72.0 TOBACCO ABUSE: ICD-10-CM

## 2020-09-14 RX ORDER — CETIRIZINE HYDROCHLORIDE 10 MG/1
TABLET ORAL
Qty: 60 TABLET | Refills: 3 | Status: CANCELLED | OUTPATIENT
Start: 2020-09-14

## 2020-09-14 RX ORDER — MONTELUKAST SODIUM 10 MG/1
10 TABLET ORAL AT BEDTIME
Qty: 60 TABLET | Refills: 3 | Status: SHIPPED | OUTPATIENT
Start: 2020-09-14 | End: 2021-02-18

## 2020-09-14 RX ORDER — FLUTICASONE PROPIONATE AND SALMETEROL XINAFOATE 230; 21 UG/1; UG/1
2 AEROSOL, METERED RESPIRATORY (INHALATION) 2 TIMES DAILY
Qty: 1 INHALER | Refills: 11 | Status: SHIPPED | OUTPATIENT
Start: 2020-09-14 | End: 2021-02-18

## 2020-09-14 ASSESSMENT — MIFFLIN-ST. JEOR: SCORE: 1417.64

## 2020-09-14 NOTE — PROGRESS NOTES
"Family Medicine Telephone Visit Note         Telephone Visit Consent   Patient was verbally read the following and verbal consent was obtained.    \"Telephone visits are billed at different rates depending on your insurance coverage. During this emergency period, for some insurers they may be billed the same as an in-person visit.  Please reach out to your insurance provider with any questions.  If during the course of the call the physician/provider feels a telephone visit is not appropriate, you will not be charged for this service.\"    Name person giving consent:  Patient   Date verbal consent given:  9/14/2020  Time verbal consent given:  2:07 PM     Chief Complaint   Patient presents with     Follow Up     follow up      Due to patient being non-English speaking/uses sign language, an  was used for this visit. Only for face-to-face interpretation by an external agency, date and length of interpretation can be found on the scanned worksheet.     name: Steve Pereyra  Agency: [employer]  Language: Norma   Telephone number: [phone]  Type of interpretation: Telephone, spoken            HPI   Patients name: Mira  Appointment start time:  2:12 PM    At last visit, pt was using albuterol inhaler 2-3x / week and was better overall.   Today he says he is doing well.    Currently using the albuterol 1-2x week, except for exercise.  Pt reports that he has been using his medications as prescribed.  Using Advair 2 puffs in morning and 2 puffs at night.    Pt has been prescribed zyrtec for allergies. Takes daily.      At last visit, pt reported ongoing smoking cigarettes and was trying to quit using gum.  Smiking is decreasing a lot.  Has been using the nicotine gum which has been very helpful.  Currently smoking 1 to 4 cigarettes daily.  Used to smoke 3 ot 4 daily, now only does 3 to 4 daily.  Will set goal of smoking 2 cigarettes.       Current Outpatient Medications   Medication Sig Dispense Refill     " acetaminophen (TYLENOL) 500 MG tablet TAKE 1-2 TABLETS (500-1,000 MG) BY MOUTH EVERY 6 HOURS AS NEEDED FOR MILD PAIN 60 tablet 1     albuterol (PROAIR HFA/PROVENTIL HFA/VENTOLIN HFA) 108 (90 Base) MCG/ACT inhaler Inhale 2 puffs into the lungs every 6 hours as needed for shortness of breath / dyspnea or wheezing 2 Inhaler 3     cetirizine (ZYRTEC) 10 MG tablet TAKE 1 TABLET (10 MG) BY MOUTH DAILY FOR ALLERGIES 60 tablet 3     FLUoxetine (PROZAC) 10 MG capsule Take 10 mg by mouth daily       fluticasone-salmeterol (ADVAIR-HFA) 230-21 MCG/ACT inhaler Inhale 2 puffs into the lungs 2 times daily 1 Inhaler 3     montelukast (SINGULAIR) 10 MG tablet Take 1 tablet (10 mg) by mouth At Bedtime 30 tablet 1     nicotine (NICORETTE) 2 MG gum Place 1 each (2 mg) inside cheek as needed for smoking cessation 40 each 3     QUEtiapine (SEROQUEL) 200 MG tablet Take 200 mg by mouth At Bedtime       Allergies   Allergen Reactions     Nkda [No Known Drug Allergies]               Review of Systems:     Constitutional, HEENT, cardiovascular, pulmonary, gi and gu systems are negative, except as otherwise noted.         Physical Exam:     There were no vitals taken for this visit.  Estimated body mass index is 25.15 kg/m  as calculated from the following:    Height as of 7/30/20: 1.524 m (5').    Weight as of 8/24/20: 58.4 kg (128 lb 12.8 oz).    Exam:  Constitutional: healthy, alert and no distress  Psychiatric: mentation appears normal and affect normal/bright          Assessment and Plan     Mira Segundo was seen for virtual visit for follow-up of chronic conditions, which are stable overall.    Moderate persistent asthma without complication  Pt now adherent to advair 230-21 2 pufs BID and singulair 10 mg daily.  Symptoms improving. Using rescue inhaler 1-2x weekly.  --still working on tobacco cessation  -- will consider increaseing # puffs advair or adding spiriva inhaler  -- follow up in 1 month - in-person    Tobacco abuse  Now smoking 1 to  4 cigarettes daily (3-4 days 3-4 cigs)  Was 3 to 4 daily last month.  --New goal:  2 cigarettes daily - consistently  -- continue use of nicotine gum, which is helping    Refilled medications that would be required in the next 3 months.     After Visit Information: Patient declined AVS     Appointment end time: 2:31 PM. This is a telephone visit that took 19 minutes.    Follow-up:  Return in about 1 month (around 10/14/2020).    The patient was seen by, and discussed with, Dr. Ren Ralph who agrees with the plan.  -----  Felisa Sutton MD  PGY-2  [facility]  Office: [phone]  Pager: [phone]

## 2020-09-14 NOTE — ASSESSMENT & PLAN NOTE
Pt now adherent to advair 230-21 2 pufs BID and singulair 10 mg daily.  Symptoms improving. Using rescue inhaler 1-2x weekly.  --still working on tobacco cessation  -- will consider increaseing # puffs advair or adding spiriva inhaler  -- follow up in 1 month - in-person

## 2020-09-14 NOTE — PROGRESS NOTES
Preceptor Attestation:    I talked to the patient on the phone and discussed the patient with the resident. I have verified the content of the note, which accurately reflects my assessment of the patient and the plan of care.   Supervising Physician:  Ren Ralph MD.

## 2020-09-14 NOTE — ASSESSMENT & PLAN NOTE
Now smoking 1 to 4 cigarettes daily (3-4 days 3-4 cigs)  Was 3 to 4 daily last month.  --New goal:  2 cigarettes daily - consistently  -- continue use of nicotine gum, which is helping

## 2020-09-30 DIAGNOSIS — Z00.00 PREVENTATIVE HEALTH CARE: ICD-10-CM

## 2020-10-05 RX ORDER — ACETAMINOPHEN 500 MG
500-1000 TABLET ORAL EVERY 6 HOURS PRN
Qty: 90 TABLET | Refills: 1 | Status: SHIPPED | OUTPATIENT
Start: 2020-10-05 | End: 2021-02-12

## 2020-10-15 ENCOUNTER — OFFICE VISIT (OUTPATIENT)
Dept: FAMILY MEDICINE | Facility: CLINIC | Age: 25
End: 2020-10-15
Payer: COMMERCIAL

## 2020-10-15 VITALS
WEIGHT: 133.8 LBS | BODY MASS INDEX: 24.47 KG/M2 | SYSTOLIC BLOOD PRESSURE: 123 MMHG | HEART RATE: 77 BPM | TEMPERATURE: 98.1 F | OXYGEN SATURATION: 99 % | RESPIRATION RATE: 20 BRPM | DIASTOLIC BLOOD PRESSURE: 88 MMHG

## 2020-10-15 DIAGNOSIS — F43.23 ADJUSTMENT DISORDER WITH MIXED ANXIETY AND DEPRESSED MOOD: ICD-10-CM

## 2020-10-15 DIAGNOSIS — G47.00 INSOMNIA, UNSPECIFIED TYPE: Primary | ICD-10-CM

## 2020-10-15 DIAGNOSIS — Z79.899 LONG TERM CURRENT USE OF ANTIPSYCHOTIC MEDICATION: ICD-10-CM

## 2020-10-15 LAB
ALBUMIN SERPL BCP-MCNC: 4.3 G/DL (ref 3.5–5)
ALP SERPL-CCNC: 64 U/L (ref 45–120)
ALT SERPL W/O P-5'-P-CCNC: 14 U/L (ref 0–45)
ANION GAP SERPL CALCULATED.3IONS-SCNC: 8 MMOL/L (ref 5–18)
AST SERPL-CCNC: 22 U/L (ref 0–40)
BILIRUB SERPL-MCNC: 0.7 MG/DL (ref 0–1)
BUN SERPL-MCNC: 10 MG/DL (ref 8–22)
CALCIUM SERPL-MCNC: 9 MG/DL (ref 8.5–10.5)
CHLORIDE SERPL-SCNC: 105 MMOL/L (ref 98–107)
CHOLEST SERPL-MCNC: 216 MG/DL
CO2 SERPL-SCNC: 29 MMOL/L (ref 22–31)
CREAT SERPL-MCNC: 1.01 MG/DL (ref 0.7–1.3)
FASTING?: ABNORMAL
GLUCOSE SERPL-MCNC: 88 MG/DL (ref 70–125)
HCT VFR BLD AUTO: 44.5 % (ref 40–53)
HDLC SERPL-MCNC: 74 MG/DL
HEMOGLOBIN: 13.3 G/DL (ref 13.3–17.7)
LDLC SERPL CALC-MCNC: 116 MG/DL
MCH RBC QN AUTO: 20 PG (ref 26.5–35)
MCHC RBC AUTO-ENTMCNC: 29.9 G/DL (ref 32–36)
MCV RBC AUTO: 67 FL (ref 78–100)
PLATELET # BLD AUTO: 287 K/UL (ref 150–450)
POTASSIUM SERPL-SCNC: 4.2 MMOL/L (ref 3.5–5)
PROT SERPL-MCNC: 7.2 G/DL (ref 6–8)
RBC # BLD AUTO: 6.6 M/UL (ref 4.4–5.9)
SODIUM SERPL-SCNC: 142 MMOL/L (ref 136–145)
T3, TOTAL - QUEST: 73 NG/DL (ref 45–175)
T4 FREE SERPL-MCNC: 1 NG/DL (ref 0.7–1.8)
TRIGL SERPL-MCNC: 130 MG/DL
TSH SERPL DL<=0.05 MIU/L-ACNC: 0.85 UIU/ML (ref 0.3–5)
WBC # BLD AUTO: 5.1 K/UL (ref 4–11)

## 2020-10-15 PROCEDURE — 99214 OFFICE O/P EST MOD 30 MIN: CPT | Mod: GC | Performed by: STUDENT IN AN ORGANIZED HEALTH CARE EDUCATION/TRAINING PROGRAM

## 2020-10-15 PROCEDURE — 36415 COLL VENOUS BLD VENIPUNCTURE: CPT | Performed by: STUDENT IN AN ORGANIZED HEALTH CARE EDUCATION/TRAINING PROGRAM

## 2020-10-15 PROCEDURE — 85027 COMPLETE CBC AUTOMATED: CPT | Performed by: STUDENT IN AN ORGANIZED HEALTH CARE EDUCATION/TRAINING PROGRAM

## 2020-10-15 ASSESSMENT — ENCOUNTER SYMPTOMS
HEMATURIA: 0
CHILLS: 0
COLOR CHANGE: 0
VOMITING: 0
ABDOMINAL PAIN: 0
FEVER: 0
RHINORRHEA: 0
DIZZINESS: 0
HEADACHES: 0
BACK PAIN: 0
DYSPHORIC MOOD: 1
DYSURIA: 0
PALPITATIONS: 0
ARTHRALGIAS: 0
SORE THROAT: 0
COUGH: 0
SEIZURES: 0
NERVOUS/ANXIOUS: 1
SHORTNESS OF BREATH: 0

## 2020-10-15 ASSESSMENT — ANXIETY QUESTIONNAIRES
3. WORRYING TOO MUCH ABOUT DIFFERENT THINGS: SEVERAL DAYS
2. NOT BEING ABLE TO STOP OR CONTROL WORRYING: SEVERAL DAYS
IF YOU CHECKED OFF ANY PROBLEMS ON THIS QUESTIONNAIRE, HOW DIFFICULT HAVE THESE PROBLEMS MADE IT FOR YOU TO DO YOUR WORK, TAKE CARE OF THINGS AT HOME, OR GET ALONG WITH OTHER PEOPLE: SOMEWHAT DIFFICULT
1. FEELING NERVOUS, ANXIOUS, OR ON EDGE: NEARLY EVERY DAY
GAD7 TOTAL SCORE: 13
7. FEELING AFRAID AS IF SOMETHING AWFUL MIGHT HAPPEN: NEARLY EVERY DAY
5. BEING SO RESTLESS THAT IT IS HARD TO SIT STILL: SEVERAL DAYS
6. BECOMING EASILY ANNOYED OR IRRITABLE: NEARLY EVERY DAY

## 2020-10-15 ASSESSMENT — PATIENT HEALTH QUESTIONNAIRE - PHQ9
5. POOR APPETITE OR OVEREATING: SEVERAL DAYS
SUM OF ALL RESPONSES TO PHQ QUESTIONS 1-9: 13

## 2020-10-15 NOTE — LETTER
October 19, 2020      Mira Segundo  [address]  [place] [address]        Dear ,    We are writing to inform you of your test results.    Your test results came back within the normal range for your thyroid, kidneys, and blood count. We will send the results to your psychiatrist at [place] as well.     Resulted Orders   TSH  Sensitive ([facility])   Result Value Ref Range    TSH 0.85 0.30 - 5.00 uIU/mL    Narrative    Test performed by:  [facility]  [address] [place] [address]   T4  Free ([facility])   Result Value Ref Range    T4 Free 1.0 0.7 - 1.8 ng/dL    Narrative    Test performed by:  [facility]  [address] [place] [address]   CBC with Plt ([facility])   Result Value Ref Range    WBC 5.1 4.0 - 11.0 K/uL    RBC 6.6 (H) 4.4 - 5.9 M/uL    Hemoglobin 13.3 13.3 - 17.7 g/dL    Hematocrit 44.5 40.0 - 53.0 %    MCV 67.0 (L) 78.0 - 100.0 fL    MCH 20.0 (L) 26.5 - 35.0    MCHC 29.9 (L) 32.0 - 36.0 g/dL    Platelets 287.0 150.0 - 450.0 K/uL   T3 Total ([facility])   Result Value Ref Range    T3 Total 73 45 - 175 ng/dL    Narrative    Test performed by:  [facility]  [address] [place] [address]   Lipid [place] ([facility]) - Results > 1 hr   Result Value Ref Range    Cholesterol 216 (H) <=199 mg/dL    Triglycerides 130 <=149 mg/dL    HDL Cholesterol 74 >=40 mg/dL    LDL Cholesterol Calculated 116 <=129 mg/dL    Fasting? Unknown     Narrative    Test performed by:  [facility]  [address] [place] [address]   Comprehensive Metabolic ([facility]) - Results > 1 hr   Result Value Ref Range    Sodium 142 136 - 145 mmol/L    Potassium 4.2 3.5 - 5.0 mmol/L    Chloride 105 98 - 107 mmol/L    CO2, Total 29 22 - 31 mmol/L    Anion Gap 8 5 - 18 mmol/L    Glucose 88 70 - 125 mg/dL    Urea Nitrogen 10 8 - 22 mg/dL    Creatinine 1.01 0.70 - 1.30 mg/dL    GFR Estimate If  Black >60 >60 mL/min/1.73m2    GFR Estimate >60 >60 mL/min/1.73m2    Bilirubin Total 0.7 0.0 - 1.0 mg/dL    Calcium 9.0 8.5 - 10.5 mg/dL    Protein Total 7.2 6.0 - 8.0 g/dL    Albumin 4.3 3.5 - 5.0 g/dL    Alkaline Phosphatase 64 45 - 120 U/L    AST (SGOT) 22 0 - 40 U/L    ALT (SGPT) 14 0 - 45 U/L    Narrative    Test performed by:  [facility] [address] [place] [address]  Fasting Glucose reference range is 70-99 mg/dL per  American Diabetes Association (ADA) guidelines.       If you have any questions or concerns, please call the clinic at the number listed above.       Sincerely,        Felisa Sutton MD

## 2020-10-15 NOTE — PROGRESS NOTES
[facility]         YRIS Segundo is a 25 year old  male with a medical history significant for asthma, and depresion with psychosis who presents to clinic today for:   Chief Complaint   Patient presents with     Follow Up     Asthma and medication     1) Asthma is much improved from before.  Using albuterol  1 to 3x a week.  Taking advair as prescribed.  2 puffs BID.    2) Mood is more of a problem. Anxiety, racing thoughts affect everything.  Then cannot sleep and then problems snowball from there.  Also endorses dysphoric mood.  Sleeps okay when he takes the Seroquel, which he takes 5 to 6 times a week.  Feels like seroquel causes side effects -  Stuffy nose, palpitations, and  Increased agitation &anxiety during the following day.  Feels like the medication is causing toto much variation in energy level/ mood.  Will follow-up with psychiatrist  At [facility] [place]  in 1 week. SHANTAL completed.  Was seeing a psychologist at [place] and did not like the way they asked questions and did not find it helpful at all, so he cancelled the care.  -- will think about possibly retrying another psychologist    3) New symptom:  Feels hot-flash randomly x 2017 which lasts approximately few hours to whole day.  It's not a problem int he winter, but more of a heat intolerance. Reports that appetite has been.    PHQ 6/15/2020 7/17/2020 7/30/2020   PHQ-9 Total Score 19 22 15   Q9: Thoughts of better off dead/self-harm past 2 weeks Not at all Not at all Several days   F/U: Thoughts of suicide or self-harm - - -   F/U: Self harm-plan - - -   F/U: Self-harm action - - -   F/U: Safety concerns - - -     GAD7 score: 13, somewhat difficult    Norma  assisted with visit - Steve Pereyra.  PMH, Medications and Allergies were reviewed and updated as needed.        REVIEW OF SYSTEMS     Review of Systems   Constitutional: Negative for chills and fever.   HENT: Negative for rhinorrhea and sore throat.     Eyes: Negative for visual disturbance.   Respiratory: Negative for cough and shortness of breath.    Cardiovascular: Negative for chest pain and palpitations.   Gastrointestinal: Negative for abdominal pain and vomiting.   Genitourinary: Negative for dysuria and hematuria.   Musculoskeletal: Negative for arthralgias and back pain.   Skin: Negative for color change and rash.   Neurological: Negative for dizziness, seizures, syncope and headaches.   Psychiatric/Behavioral: Positive for dysphoric mood. The patient is nervous/anxious.    All other systems reviewed and are negative.        OBJECTIVE     Blood pressure 123/88, pulse 77, temperature 98.1  F (36.7  C), temperature source Oral, resp. rate 20, weight 60.7 kg (133 lb 12.8 oz), SpO2 99 %.   Body mass index is 24.47 kg/m .    Physical Exam  Constitutional:       General: He is not in acute distress.     Appearance: He is not ill-appearing.   HENT:      Head: Normocephalic and atraumatic.      Mouth/Throat:      Mouth: Mucous membranes are moist.   Eyes:      General: No scleral icterus.     Extraocular Movements: Extraocular movements intact.      Conjunctiva/sclera: Conjunctivae normal.   Cardiovascular:      Rate and Rhythm: Normal rate and regular rhythm.      Pulses: Normal pulses.      Heart sounds: No murmur. No friction rub. No gallop.    Pulmonary:      Effort: Pulmonary effort is normal. No respiratory distress.      Breath sounds: Normal breath sounds. No stridor. No wheezing or rhonchi.   Abdominal:      General: Bowel sounds are normal. There is no distension.      Palpations: Abdomen is soft.      Tenderness: There is no abdominal tenderness. There is no guarding.   Musculoskeletal: Normal range of motion.   Skin:     General: Skin is warm and dry.      Findings: No lesion or rash.   Neurological:      General: No focal deficit present.      Mental Status: He is alert and oriented to person, place, and time.   Psychiatric:         Attention and  Perception: Attention normal.         Mood and Affect: Mood is depressed. Affect is blunt.         Speech: Speech normal.         Behavior: Behavior is withdrawn.         Thought Content: Thought content normal.         Cognition and Memory: Cognition normal.         Judgment: Judgment normal.       Test results pending.    ASSESSMENT AND PLAN     1. Insomnia; heat intolerance: TSH was last checked 2 years ago and was on the low end of normal.  Patient with vague symptoms.  - TSH  Sensitive ([facility])  - T4  Free ([facility])  - T3 Total ([facility])    2. Long term current use of antipsychotic medication: Patient is on multiple psychiatric medications with no labs checked recently.  - CBC with Plt ([facility])  - Lipid [place] ([facility]) - Results > 1 hr  - Comprehensive Metabolic ([facility]) - Results > 1 hr    3. Adjustment disorder with mixed anxiety and depressed mood  Patient endorses dysphoric mood and anxiety today.  He is concerned that his Seroquel makes him agitated during the day.  Is taking 200 mg nightly approximately 5 times a week.  Given that he has a psychiatrist already, recommended follow-up.  It appears patient has new/worsening anxiety compounding mental health treatment.  -SHANTAL for [facility] psychiatrist  -Patient currently not interested in psychotherapy, but will think about it  -We will reassess plan after communicating with patient's psychiatrist.    Patient Instructions   bring all your medications to your next visit        Follow-up: Return in about 1 month (around 11/15/2020).    The patient was seen by, and discussed with, Dr. Ananth Carrizales who agrees with the plan.  -----  Felisa Sutton MD  PGY-2  [facility]  Office: [phone]  Pager: [phone]    [facility], psychiatry dept

## 2020-10-15 NOTE — Clinical Note
Neil Mims,  Please print out my note from today's visit as well as lab results including lipid panel, CBC, TSH, T3 and T4.  Fax all to [facility] psychiatry since we have SHANTAL.  If SHANTAL has not been sent, send all together and will fax.  We can discuss in person on Monday.  Felisa Sutton MD

## 2020-10-15 NOTE — PROGRESS NOTES
Preceptor Attestation:    Patient seen and evaluated in person. I discussed the patient with the resident. I have verified the content of the note, which accurately reflects my assessment of the patient and the plan of care.   Supervising Physician:  Ananth Carrizales MD.

## 2020-10-15 NOTE — NURSING NOTE
Due to patient being non-English speaking/uses sign language, an  was used for this visit. Only for face-to-face interpretation by an external agency, date and length of interpretation can be found on the scanned worksheet.     name: Steve Pereyra  Agency: [employer]  Language: Norma   Telephone number: [phone]  Type of interpretation: Face-to-face, spoken

## 2020-10-16 ASSESSMENT — ANXIETY QUESTIONNAIRES: GAD7 TOTAL SCORE: 13

## 2020-10-28 ENCOUNTER — OFFICE VISIT (OUTPATIENT)
Dept: FAMILY MEDICINE | Facility: CLINIC | Age: 25
End: 2020-10-28
Payer: COMMERCIAL

## 2020-10-28 VITALS
HEART RATE: 75 BPM | BODY MASS INDEX: 24.98 KG/M2 | RESPIRATION RATE: 16 BRPM | TEMPERATURE: 98.1 F | WEIGHT: 136.6 LBS | OXYGEN SATURATION: 98 % | DIASTOLIC BLOOD PRESSURE: 87 MMHG | SYSTOLIC BLOOD PRESSURE: 132 MMHG

## 2020-10-28 DIAGNOSIS — J45.40 MODERATE PERSISTENT ASTHMA WITHOUT COMPLICATION: ICD-10-CM

## 2020-10-28 DIAGNOSIS — F33.1 MAJOR DEPRESSIVE DISORDER, RECURRENT EPISODE, MODERATE (H): Primary | ICD-10-CM

## 2020-10-28 DIAGNOSIS — Z71.89 ENCOUNTER FOR MEDICATION REVIEW AND COUNSELING: ICD-10-CM

## 2020-10-28 DIAGNOSIS — F51.05 INSOMNIA DUE TO OTHER MENTAL DISORDER: ICD-10-CM

## 2020-10-28 DIAGNOSIS — F99 INSOMNIA DUE TO OTHER MENTAL DISORDER: ICD-10-CM

## 2020-10-28 PROBLEM — F17.200 TOBACCO USE DISORDER: Status: ACTIVE | Noted: 2020-08-25

## 2020-10-28 PROBLEM — F10.11 ALCOHOL USE DISORDER, MILD, IN EARLY REMISSION: Status: ACTIVE | Noted: 2020-10-28

## 2020-10-28 PROBLEM — F12.10 CANNABIS USE DISORDER, MILD, ABUSE: Status: ACTIVE | Noted: 2020-10-28

## 2020-10-28 PROBLEM — F43.10 PTSD (POST-TRAUMATIC STRESS DISORDER): Chronic | Status: ACTIVE | Noted: 2020-10-28

## 2020-10-28 PROCEDURE — 99213 OFFICE O/P EST LOW 20 MIN: CPT | Mod: GC | Performed by: STUDENT IN AN ORGANIZED HEALTH CARE EDUCATION/TRAINING PROGRAM

## 2020-10-28 RX ORDER — ARIPIPRAZOLE 10 MG/1
10 TABLET ORAL EVERY EVENING
COMMUNITY
Start: 2020-10-06 | End: 2021-02-22

## 2020-10-28 RX ORDER — BENZTROPINE MESYLATE 1 MG/1
1 TABLET ORAL AT BEDTIME
COMMUNITY
Start: 2020-09-30 | End: 2020-10-28

## 2020-10-28 RX ORDER — BENZTROPINE MESYLATE 1 MG/1
1 TABLET ORAL AT BEDTIME
Qty: 30 TABLET | Refills: 0 | Status: SHIPPED | OUTPATIENT
Start: 2020-10-28 | End: 2021-02-22

## 2020-10-28 RX ORDER — HYDROXYZINE HYDROCHLORIDE 50 MG/1
50 TABLET, FILM COATED ORAL AT BEDTIME
COMMUNITY
Start: 2020-10-19 | End: 2021-02-22

## 2020-10-28 RX ORDER — QUETIAPINE FUMARATE 200 MG/1
300 TABLET, FILM COATED ORAL AT BEDTIME
COMMUNITY
Start: 2020-10-28 | End: 2021-01-05

## 2020-10-28 ASSESSMENT — ENCOUNTER SYMPTOMS
SEIZURES: 0
SHORTNESS OF BREATH: 0
SORE THROAT: 0
FEVER: 0
COLOR CHANGE: 0
BACK PAIN: 0
PALPITATIONS: 0
CHILLS: 0
ABDOMINAL PAIN: 0
DYSURIA: 0
FATIGUE: 1
VOMITING: 0
NERVOUS/ANXIOUS: 1
HEMATURIA: 0
DYSPHORIC MOOD: 1
DIZZINESS: 0
RHINORRHEA: 0
HEADACHES: 0
COUGH: 0
ARTHRALGIAS: 0

## 2020-10-28 ASSESSMENT — PATIENT HEALTH QUESTIONNAIRE - PHQ9: SUM OF ALL RESPONSES TO PHQ QUESTIONS 1-9: 15

## 2020-10-28 NOTE — PROGRESS NOTES
Preceptor attestation:  Vital signs reviewed: /87 (BP Location: Left arm, Patient Position: Sitting, Cuff Size: Adult Regular)   Pulse 75   Temp 98.1  F (36.7  C) (Oral)   Resp 16   Wt 62 kg (136 lb 9.6 oz)   SpO2 98%   BMI 24.98 kg/m      Patient seen, evaluated, and discussed with the resident.  I have verified the content of the note, which accurately reflects my assessment of the patient and the plan of care.    Supervising physician: Ashtyn Poe MD  [facility]

## 2020-10-28 NOTE — PATIENT INSTRUCTIONS
"  Patient Education     Tips for Sleep Hygiene  \"Sleep hygiene\" means having good sleep habits.Follow these tips to sleep better at night:     Get on a schedule. Go to bed and get up at about the same time every day.    Listen to your body. Only try to sleep when you actually feel tired or sleepy.    Be patient. If you haven't been able to get to sleep after about 30 minutes or more, get up and do something calming or boring until you feel sleepy. Then return to bed and try again.    Don't have caffeine (coffee, tea, cola drinks, chocolate and some medicines), alcohol or nicotine (cigarettes). These can make it harder for you to fall asleep and stay asleep.    Use your bed for sleeping only. That means no TV, computer or homework in bed, especially during the evening and before bedtime.    Don't nap during the day. If you must nap, make sure it is for less than 20 minutes.    Create sleep rituals that remind your body it is time to sleep. Examples include breathing exercises, stretching or reading a book.    Avoid all electronic media (smart phone, computer, tablet) within 2 hours of bed time. The \"blue light\" in these devices activates the part of the brain that keeps you awake.    Dim the lights at night.    Get early morning sources of light (walk in the sunshine) to help set sleep patterns at night.    Try a bath or shower before bed. Having a warm bath 1 to 2 hours before bedtime can help you feel sleepy. Hot baths can make you alert, so be mindful of the temperature.    Don't watch the clock. Checking the clock during the night can wake you up. It can also lead to negative thoughts such as, \"I will never fall asleep,\" which can increase anxiety and sleeplessness.    Use a sleep diary. Track your sleep schedule to know your sleep patterns and to see where you can improve.    Get regular exercise every day. Try not to do heavy exercise in the 4 hours before bedtime.    Eat a healthy, balanced diet.    Try eating " a light, healthy snack before bed, but avoid eating a heavy meal.    Create the right sleeping area. A cool, dark, quiet room is best. If needed, try earplugs, fans and blackout curtains.    Keep your daytime routine the same even if you have a bad night sleep. Avoiding activities the next day can make it harder to sleep.    Copyright   2013 [employer] All rights reserved. [employer] 327726 - 01/16.  For informational purposes only. Not to replace the advice of your health care provider.  Copyright   2018 [employer]. All rights reserved.

## 2020-10-28 NOTE — PROGRESS NOTES
[facility]         YRIS Segundo is a 25 year old  male with a medical history significant for mood disorder with psychotic features, use of antipsychotic meds, and asthma who presents to clinic today for:   Chief Complaint   Patient presents with     Follow Up     Medication       At prior visit 2 weeks ago, pt complained of insomnia and was concerned it was caused by seroquel.  Labs were done and pt instructed to follow-up with pscychiatrist.  - lab results reviewed  - asthma, much better controlled. ACT score 19.  - depression: much the same as before, some ups and downs.  Phq9 score of 15.  Coping drawing, movies, plays guitar which help a bit.  Has psychiatry appt in 1 week. psychiatry - [facility] completed, records received today.  Not interested in therapy counseling at this time.  Feels like he can talk with aunt and sister.  Denies SI and  HI.     A [employer]  was used for  this visit - Steve Pereyra.  PMH, Medications and Allergies were reviewed and updated as needed.  Reviewed medications today with patient's bottles.    Current Outpatient Medications:      acetaminophen (TYLENOL) 500 MG tablet, TAKE 1-2 TABLETS (500-1,000 MG) BY MOUTH EVERY 6 HOURS AS NEEDED FOR MILD PAIN, Disp: 90 tablet, Rfl: 1     ARIPiprazole (ABILIFY) 10 MG tablet, Take 10 mg by mouth every evening, Disp: , Rfl:      benztropine (COGENTIN) 1 MG tablet, Take 1 tablet (1 mg) by mouth At Bedtime, Disp: 30 tablet, Rfl: 0     cetirizine (ZYRTEC) 10 MG tablet, TAKE 1 TABLET (10 MG) BY MOUTH DAILY FOR ALLERGIES, Disp: 60 tablet, Rfl: 3     FLUoxetine (PROZAC) 10 MG capsule, Take 10 mg by mouth daily, Disp: , Rfl:      hydrOXYzine (ATARAX) 50 MG tablet, Take 50 mg by mouth At Bedtime, Disp: , Rfl:      montelukast (SINGULAIR) 10 MG tablet, Take 1 tablet (10 mg) by mouth At Bedtime, Disp: 60 tablet, Rfl: 3     albuterol (PROAIR HFA/PROVENTIL HFA/VENTOLIN HFA) 108 (90 Base) MCG/ACT inhaler, Inhale 2 puffs into the  lungs every 6 hours as needed for shortness of breath / dyspnea or wheezing, Disp: 2 Inhaler, Rfl: 3     fluticasone-salmeterol (ADVAIR-HFA) 230-21 MCG/ACT inhaler, Inhale 2 puffs into the lungs 2 times daily, Disp: 1 Inhaler, Rfl: 11     nicotine (NICORETTE) 2 MG gum, Place 1 each (2 mg) inside cheek as needed for smoking cessation, Disp: 40 each, Rfl: 3     QUEtiapine (SEROQUEL) 200 MG tablet, Take 200 mg by mouth At Bedtime, Disp: , Rfl:         REVIEW OF SYSTEMS     Review of Systems   Constitutional: Positive for fatigue. Negative for chills and fever.   HENT: Negative for rhinorrhea and sore throat.    Eyes: Negative for visual disturbance.   Respiratory: Negative for cough and shortness of breath.    Cardiovascular: Negative for chest pain and palpitations.   Gastrointestinal: Negative for abdominal pain and vomiting.   Genitourinary: Negative for dysuria and hematuria.   Musculoskeletal: Negative for arthralgias and back pain.   Skin: Negative for color change and rash.   Neurological: Negative for dizziness, seizures, syncope and headaches.        Insomnia   Psychiatric/Behavioral: Positive for dysphoric mood. The patient is nervous/anxious.    All other systems reviewed and are negative.        OBJECTIVE     Blood pressure 132/87, pulse 75, temperature 98.1  F (36.7  C), temperature source Oral, resp. rate 16, weight 62 kg (136 lb 9.6 oz), SpO2 98 %.   Body mass index is 24.98 kg/m .    Physical Exam  Vitals signs reviewed.   Constitutional:       General: He is not in acute distress.     Appearance: He is normal weight.   HENT:      Head: Normocephalic.   Cardiovascular:      Rate and Rhythm: Normal rate and regular rhythm.      Pulses: Normal pulses.   Pulmonary:      Effort: Pulmonary effort is normal. No respiratory distress.      Breath sounds: Normal breath sounds. No wheezing.   Abdominal:      General: Abdomen is flat.      Palpations: Abdomen is soft.   Musculoskeletal: Normal range of motion.    Skin:     General: Skin is warm and dry.   Neurological:      General: No focal deficit present.      Mental Status: He is alert.      Gait: Gait normal.   Psychiatric:         Attention and Perception: Attention normal.         Mood and Affect: Mood is depressed. Affect is flat.         Speech: Speech normal.         Behavior: Behavior normal.       Reviewed labs from last visit.  Office Visit on 10/15/2020   Component Date Value Ref Range Status     TSH 10/15/2020 0.85  0.30 - 5.00 uIU/mL Final     T4 Free 10/15/2020 1.0  0.7 - 1.8 ng/dL Final     WBC 10/15/2020 5.1  4.0 - 11.0 K/uL Final     RBC 10/15/2020 6.6* 4.4 - 5.9 M/uL Final     Hemoglobin 10/15/2020 13.3  13.3 - 17.7 g/dL Final     Hematocrit 10/15/2020 44.5  40.0 - 53.0 % Final     MCV 10/15/2020 67.0* 78.0 - 100.0 fL Final     MCH 10/15/2020 20.0* 26.5 - 35.0 Final     MCHC 10/15/2020 29.9* 32.0 - 36.0 g/dL Final     Platelets 10/15/2020 287.0  150.0 - 450.0 K/uL Final     T3 Total 10/15/2020 73  45 - 175 ng/dL Final     Cholesterol 10/15/2020 216* <=199 mg/dL Final     Triglycerides 10/15/2020 130  <=149 mg/dL Final     HDL Cholesterol 10/15/2020 74  >=40 mg/dL Final     LDL Cholesterol Calculated 10/15/2020 116  <=129 mg/dL Final     Fasting? 10/15/2020 Unknown   Final     Sodium 10/15/2020 142  136 - 145 mmol/L Final     Potassium 10/15/2020 4.2  3.5 - 5.0 mmol/L Final     Chloride 10/15/2020 105  98 - 107 mmol/L Final     CO2, Total 10/15/2020 29  22 - 31 mmol/L Final     Anion Gap 10/15/2020 8  5 - 18 mmol/L Final     Glucose 10/15/2020 88  70 - 125 mg/dL Final     Urea Nitrogen 10/15/2020 10  8 - 22 mg/dL Final     Creatinine 10/15/2020 1.01  0.70 - 1.30 mg/dL Final     GFR Estimate If Black 10/15/2020 >60  >60 mL/min/1.73m2 Final     GFR Estimate 10/15/2020 >60  >60 mL/min/1.73m2 Final     Bilirubin Total 10/15/2020 0.7  0.0 - 1.0 mg/dL Final     Calcium 10/15/2020 9.0  8.5 - 10.5 mg/dL Final     Protein Total 10/15/2020 7.2  6.0 - 8.0 g/dL  Final     Albumin 10/15/2020 4.3  3.5 - 5.0 g/dL Final     Alkaline Phosphatase 10/15/2020 64  45 - 120 U/L Final     AST (SGOT) 10/15/2020 22  0 - 40 U/L Final     ALT (SGPT) 10/15/2020 14  0 - 45 U/L Final       ASSESSMENT AND PLAN     1. Severe recurrent major depression w/psychotic features, mood-congruent (H)  Pt endorses dysphoric mood.   PHQ-9 score is 15. Has social support and good coping skills.  Denies SI or HI.  Pt  Is not in a crisis at this time.  - Follow up with outpatient psychiatrist Dr. Isaac Flores at [facility] as scheduled    2. Insomnia due to other mental disorder  Pt sometimes has difficulty with sleep. Reviewed sleep hygiene tips and handout provided  - Continue sleep time routine, with less use of technology near bedtime, avoid daytime naps.  - Continue current medications for sleep including seroquel, hydroxyzine    3. Moderate persistent asthma without complication  Much improved since a few months ago.  ACT score 19  - continue montelukast, cetirizine, and advair at current doses    4. Encounter for medication review and counseling  Pt brought in bottles of all his meds and list updated  -  Refilled 1 time as pt has 1 tablet left in bottle and no refills.  Benztropine (COGENTIN) 1 MG tablet; Take 1 tablet (1 mg) by mouth At Bedtime  Dispense: 30 tablet; Refill: 0    Follow-up: Return in about 2 weeks (around 11/11/2020).    The patient was seen by, and discussed with, Dr. Ashtyn Poe who agrees with the plan.  -----  Felisa Sutton MD  PGY-2  [facility]  Office: [phone]  Pager: [phone]    CC: Dr. Isaac Flores, [facility] (psychiatrist)

## 2020-10-28 NOTE — NURSING NOTE
Due to patient being non-English speaking/uses sign language, an  was used for this visit. Only for face-to-face interpretation by an external agency, date and length of interpretation can be found on the scanned worksheet.     name: Steve Pereyra  Agency: [employer]  Language: Norma   Telephone number: [phone]  Type of interpretation: Face-to-face, spoken

## 2020-10-29 ASSESSMENT — ASTHMA QUESTIONNAIRES: ACT_TOTALSCORE: 19

## 2020-11-11 DIAGNOSIS — F33.1 MAJOR DEPRESSIVE DISORDER, RECURRENT EPISODE, MODERATE (H): ICD-10-CM

## 2020-11-13 ENCOUNTER — OFFICE VISIT (OUTPATIENT)
Dept: FAMILY MEDICINE | Facility: CLINIC | Age: 25
End: 2020-11-13
Payer: COMMERCIAL

## 2020-11-13 VITALS
RESPIRATION RATE: 16 BRPM | DIASTOLIC BLOOD PRESSURE: 84 MMHG | HEART RATE: 97 BPM | TEMPERATURE: 98.6 F | SYSTOLIC BLOOD PRESSURE: 120 MMHG | BODY MASS INDEX: 24.84 KG/M2 | WEIGHT: 135.8 LBS

## 2020-11-13 DIAGNOSIS — F51.05 INSOMNIA DUE TO OTHER MENTAL DISORDER: Primary | ICD-10-CM

## 2020-11-13 DIAGNOSIS — L03.012 CELLULITIS OF LEFT RING FINGER: ICD-10-CM

## 2020-11-13 DIAGNOSIS — L30.1 DYSHIDROTIC ECZEMA: ICD-10-CM

## 2020-11-13 DIAGNOSIS — F99 INSOMNIA DUE TO OTHER MENTAL DISORDER: Primary | ICD-10-CM

## 2020-11-13 DIAGNOSIS — F33.1 MAJOR DEPRESSIVE DISORDER, RECURRENT EPISODE, MODERATE (H): ICD-10-CM

## 2020-11-13 DIAGNOSIS — Z23 NEED FOR PROPHYLACTIC VACCINATION AND INOCULATION AGAINST INFLUENZA: ICD-10-CM

## 2020-11-13 DIAGNOSIS — J45.40 MODERATE PERSISTENT ASTHMA WITHOUT COMPLICATION: ICD-10-CM

## 2020-11-13 PROCEDURE — 90471 IMMUNIZATION ADMIN: CPT | Performed by: STUDENT IN AN ORGANIZED HEALTH CARE EDUCATION/TRAINING PROGRAM

## 2020-11-13 PROCEDURE — 90686 IIV4 VACC NO PRSV 0.5 ML IM: CPT | Performed by: STUDENT IN AN ORGANIZED HEALTH CARE EDUCATION/TRAINING PROGRAM

## 2020-11-13 PROCEDURE — 99214 OFFICE O/P EST MOD 30 MIN: CPT | Mod: 25 | Performed by: STUDENT IN AN ORGANIZED HEALTH CARE EDUCATION/TRAINING PROGRAM

## 2020-11-13 RX ORDER — BENZTROPINE MESYLATE 1 MG/1
1 TABLET ORAL AT BEDTIME
Qty: 30 TABLET | Refills: 0 | OUTPATIENT
Start: 2020-11-13

## 2020-11-13 RX ORDER — TRIAMCINOLONE ACETONIDE 0.25 MG/G
OINTMENT TOPICAL 2 TIMES DAILY
Qty: 15 G | Refills: 0 | Status: SHIPPED | OUTPATIENT
Start: 2020-11-13 | End: 2021-01-08

## 2020-11-13 ASSESSMENT — ENCOUNTER SYMPTOMS
SEIZURES: 0
BACK PAIN: 0
VOMITING: 0
RHINORRHEA: 0
CHILLS: 0
DIZZINESS: 0
COUGH: 0
SHORTNESS OF BREATH: 0
ARTHRALGIAS: 0
DYSPHORIC MOOD: 1
DYSURIA: 0
HEADACHES: 0
FEVER: 0
HEMATURIA: 0
PALPITATIONS: 0
SORE THROAT: 0
COLOR CHANGE: 0
ABDOMINAL PAIN: 0
NERVOUS/ANXIOUS: 0

## 2020-11-13 ASSESSMENT — PATIENT HEALTH QUESTIONNAIRE - PHQ9: SUM OF ALL RESPONSES TO PHQ QUESTIONS 1-9: 13

## 2020-11-13 NOTE — PROGRESS NOTES
[facility]         SUBJECTIVE       Mira Segundo is a 25 year old  male with a medical history significant for major depression and PTSD  who presents to clinic today for:  Follow-uip    Has not been able to follow-up with [facility] because he is waiting for a phone visit with his psychiatrist.  Pt's phone # is [phone]    Sleeps 3 to 4 hrs a night, and naps about 1 hr..  PHQ9 score is 13, somewhat difficult. Denies self harm, SI, or HI.    Got a new Rx for sleep called Olanzapine 1 week ago and it has been helping with sleep.     Asthma - doing pretty well. Worsens with cold air.  Decreasing smoking as well. Chewing nicotine gum wich helps.    -finger blister - had a blister/dry skin on left ring finger which is now red and painful.  Has other dry areas on other fingers.    A [employer]  was used for  this visit- Steve Pereyra in-person  PMH, Medications and Allergies were reviewed and updated as needed.        REVIEW OF SYSTEMS     Review of Systems   Constitutional: Negative for chills and fever.   HENT: Negative for rhinorrhea and sore throat.    Eyes: Negative for visual disturbance.   Respiratory: Negative for cough and shortness of breath.    Cardiovascular: Negative for chest pain and palpitations.   Gastrointestinal: Negative for abdominal pain and vomiting.   Genitourinary: Negative for dysuria and hematuria.   Musculoskeletal: Negative for arthralgias and back pain.   Skin: Positive for rash. Negative for color change.   Neurological: Negative for dizziness, seizures, syncope and headaches.   Psychiatric/Behavioral: Positive for dysphoric mood. Negative for self-injury and suicidal ideas. The patient is not nervous/anxious.    All other systems reviewed and are negative.        OBJECTIVE     Blood pressure 120/84, pulse 97, temperature 98.6  F (37  C), resp. rate 16, weight 61.6 kg (135 lb 12.8 oz).   Body mass index is 24.84 kg/m .    Physical Exam  Constitutional:        General: He is not in acute distress.     Appearance: He is not ill-appearing.   HENT:      Head: Normocephalic and atraumatic.      Mouth/Throat:      Mouth: Mucous membranes are moist.   Eyes:      General: No scleral icterus.     Extraocular Movements: Extraocular movements intact.      Conjunctiva/sclera: Conjunctivae normal.   Cardiovascular:      Rate and Rhythm: Normal rate and regular rhythm.      Pulses: Normal pulses.      Heart sounds: No murmur. No friction rub. No gallop.    Pulmonary:      Effort: Pulmonary effort is normal. No respiratory distress.      Breath sounds: Normal breath sounds. No stridor. No wheezing or rhonchi.   Abdominal:      General: Bowel sounds are normal. There is no distension.      Palpations: Abdomen is soft.      Tenderness: There is no abdominal tenderness. There is no guarding.   Musculoskeletal: Normal range of motion.   Skin:     General: Skin is warm and dry.      Findings: Rash present. No lesion.      Comments: Left ring finger with erythema, swelling, ttp from PIP to distal tip of finger. Other fingers with dry scaling skin.   Neurological:      General: No focal deficit present.      Mental Status: He is alert and oriented to person, place, and time.   Psychiatric:         Attention and Perception: Attention normal.         Mood and Affect: Mood is depressed. Affect is flat.         Behavior: Behavior is withdrawn.       ASSESSMENT AND PLAN     1. Insomnia due to other mental disorder  2. Major depressive disorder, recurrent episode, moderate (H)  Patient reports that sleep has improved since he received Rx for olanzapine last week. This was not filled here, so I presume it was from psychiatrist Dr. Flores.  We faxed over our notes with pt complaints few weeks ago.  Pt is now on multiple antipsychotic meds.     called [facility] to verify pt's next appoinment - info provided to pt.  PHQ9 score is high at 13 today, somwhat difficult.  However denies  self harm, SI, or HI.  Says he is managing symptoms for now.    - Follow up with psychiatrist 11/30 as scheduled    3. Moderate persistent asthma without complication  Well-controlled.  Nicotine replacement is helping with reducing smoking.  - continue current regimen    4. Cellulitis of left ring finger  Likely secondary to eczema of finger with superimposed infection from PIP to tip of finger.    - cephALEXin (KEFLEX) 250 MG capsule; Take 1 capsule (250 mg) by mouth 4 times daily for 7 days  Dispense: 28 capsule; Refill: 0    5. Dyshydrotic eczema  On fingers  - triamcinolone (KENALOG) 0.025 % external ointment; Apply topically 2 times daily  Dispense: 15 g; Refill: 0      Follow-up: Return in about 2 months (around 1/13/2021) for finger infection.    The patient was seen by, and discussed with, Dr. Radha Faria who agrees with the plan.  -----  Felisa Sutton MD  PGY-2  [facility]  Office: [phone]  Pager: [phone]

## 2020-11-13 NOTE — TELEPHONE ENCOUNTER
"Called pharmacy. This medication was mailed to patient on 11/3/2020.  Refill requests will be sent to psychiatrist Dr. Flores.  Also, pt has not received any \"olanzapine\" as he stated this morning, most likely meant this medication benztropine.  -Felisa Sutton MD   "

## 2020-11-13 NOTE — PATIENT INSTRUCTIONS
Upcoming Psychiatry Appointment:     Dr. Gonzalo Flores, [facility]   Monday 11/30/2020, 9:00-9:30am   This is a Telehealth appointment

## 2020-11-13 NOTE — NURSING NOTE
Due to patient being non-English speaking/uses sign language, an  was used for this visit. Only for face-to-face interpretation by an external agency, date and length of interpretation can be found on the scanned worksheet.     name: Steve Pereyra  Agency: [employer]  Language: Norma   Telephone number: [phone]  Type of interpretation: Face-to-face, spoken

## 2020-11-30 ENCOUNTER — TELEPHONE (OUTPATIENT)
Dept: FAMILY MEDICINE | Facility: CLINIC | Age: 25
End: 2020-11-30

## 2020-11-30 NOTE — TELEPHONE ENCOUNTER
Attempted to call x2, phone was answered but no one speaking on other line. Will attempt again at later time. ./LR

## 2020-11-30 NOTE — TELEPHONE ENCOUNTER
[facility] phone call message- general phone call:    Reason for call: the mental health clinic called to ask about medications the Pt is taking that were not prescribed by them and would like a call back     Return call needed: Yes    OK to leave a message on voice mail? Yes    Primary language: Norma      needed? Yes    Call taken on November 30, 2020 at 4:04 PM by Ananth Light

## 2020-12-03 NOTE — TELEPHONE ENCOUNTER
Wa from patient's team at [facility] calls to see if we are prescribing Olanzapine. During a telehealth visit patient showed the provider that he had a bottle at the home, however they were unable to see who the prescriber was or the date on the bottle (Dr. Flores, psychiatrist, is not prescribing this medication).      Verified that this is not on our medication list. Per 11/13/20 note, Dr. Sutton spoke with the pharmacy and verified patient is not receiving this medication.    Updated med list faxed to Wa per request. ./LR

## 2020-12-10 ENCOUNTER — OFFICE VISIT (OUTPATIENT)
Dept: FAMILY MEDICINE | Facility: CLINIC | Age: 25
End: 2020-12-10
Payer: COMMERCIAL

## 2020-12-10 VITALS
DIASTOLIC BLOOD PRESSURE: 79 MMHG | HEIGHT: 63 IN | WEIGHT: 133 LBS | RESPIRATION RATE: 20 BRPM | TEMPERATURE: 98.4 F | HEART RATE: 84 BPM | SYSTOLIC BLOOD PRESSURE: 118 MMHG | OXYGEN SATURATION: 98 % | BODY MASS INDEX: 23.57 KG/M2

## 2020-12-10 DIAGNOSIS — F33.1 MAJOR DEPRESSIVE DISORDER, RECURRENT EPISODE, MODERATE (H): ICD-10-CM

## 2020-12-10 DIAGNOSIS — L03.012 CELLULITIS OF LEFT RING FINGER: Primary | ICD-10-CM

## 2020-12-10 DIAGNOSIS — B35.2 TINEA MANUS: ICD-10-CM

## 2020-12-10 DIAGNOSIS — J45.40 MODERATE PERSISTENT ASTHMA WITHOUT COMPLICATION: ICD-10-CM

## 2020-12-10 LAB
KOH PREP: ABNORMAL
SPECIMEN DESCRIPTION: ABNORMAL

## 2020-12-10 PROCEDURE — 99214 OFFICE O/P EST MOD 30 MIN: CPT | Mod: GC | Performed by: STUDENT IN AN ORGANIZED HEALTH CARE EDUCATION/TRAINING PROGRAM

## 2020-12-10 PROCEDURE — 87220 TISSUE EXAM FOR FUNGI: CPT | Performed by: STUDENT IN AN ORGANIZED HEALTH CARE EDUCATION/TRAINING PROGRAM

## 2020-12-10 RX ORDER — CLOTRIMAZOLE 1 %
CREAM (GRAM) TOPICAL 2 TIMES DAILY
Qty: 15 G | Refills: 0 | Status: SHIPPED | OUTPATIENT
Start: 2020-12-10 | End: 2021-04-19

## 2020-12-10 ASSESSMENT — MIFFLIN-ST. JEOR: SCORE: 1483.41

## 2020-12-10 NOTE — PROGRESS NOTES
"[facility]         SUBJECTIVE       Mira Segundo is a 25 year old  male  who presents to clinic today for:   Chief Complaint   Patient presents with     RECHECK     follow up asthma, mood and finger       Cellulitis of left ring finger:   Diagnosed at last visit. took keflex for 7 days.  It went away and then a rash came back.  Patient believes  it's due a food allergy.  There is no redness or discharge from the finger.  Denies trauma to the finger or contact with chemicals on a daily basis.    Asthma: ACT score of 14. Taking meds as prescribed.  The cold weather makes it worse.  Has been using rescue inhaler 2-3x/ week.  Sometimes after going outside.    Smoking only 2 cigs/ day now.       Mood:  PHQ9 is 11, GAD7 scor is 8 today.  No SI.    Goes day by day.   Dr. Flores has seen pt and kept him on the same regimen of meds.  Coping strategies include meditation, music, connecting with community.    PHQ 12/11/2020   PHQ-9 Total Score 11   Q9: Thoughts of better off dead/self-harm past 2 weeks Not at all   F/U: Thoughts of suicide or self-harm -   F/U: Self harm-plan -   F/U: Self-harm action -   F/U: Safety concerns -     [employer]  was used for this visit - Steve Pereyra        REVIEW OF SYSTEMS      ROS: 10 point ROS neg other than the symptoms noted above in the HPI.        OBJECTIVE     Blood pressure 118/79, pulse 84, temperature 98.4  F (36.9  C), temperature source Oral, resp. rate 20, height 1.6 m (5' 3\"), weight 60.3 kg (133 lb), SpO2 98 %.   Body mass index is 23.56 kg/m .    Physical Exam  Vitals signs reviewed.   Constitutional:       General: He is not in acute distress.     Appearance: Normal appearance. He is not ill-appearing.   HENT:      Head: Normocephalic and atraumatic.   Eyes:      General: No scleral icterus.     Extraocular Movements: Extraocular movements intact.      Conjunctiva/sclera: Conjunctivae normal.   Cardiovascular:      Rate and Rhythm: Normal rate and regular " rhythm.      Pulses: Normal pulses.      Heart sounds: No murmur. No friction rub. No gallop.    Pulmonary:      Effort: Pulmonary effort is normal. No respiratory distress.      Breath sounds: Normal breath sounds. No stridor. No wheezing or rhonchi.   Musculoskeletal: Normal range of motion.   Skin:     General: Skin is warm and dry.      Capillary Refill: Capillary refill takes less than 2 seconds.      Findings: No lesion or rash.      Comments: Left ring finger with dry scaling nonerythematous rash around finger.  No discharge, no color change.  Skin otherwise normal-appearing.   Neurological:      General: No focal deficit present.      Mental Status: He is alert and oriented to person, place, and time. Mental status is at baseline.      Cranial Nerves: No cranial nerve deficit.      Sensory: No sensory deficit.      Comments: Eye blinking tic   Psychiatric:         Mood and Affect: Mood normal.         Behavior: Behavior normal.       Results for orders placed or performed in visit on 12/10/20   BOGDAN Prep ([facility])     Status: Abnormal   Result Value     Specimen Description skin     KOH Prep FUNGAL ELEMENTS PRESENT (A)        ASSESSMENT AND PLAN     1. Cellulitis of left ring finger  2.  Tinea manus of left ring finger  Cellulitis is now resolved.  However recurrence of rash around ring finger with dry scaling.  KOH prep today shows fungal elements.  Most likely had chronic tinea manus, with superimposed bacterial cellulitis which was resolved with antibiotic.  Recurrence of rash at this time.  Will treat with topical medication at this time and follow-up at next visit.  - BOGDAN Prep ([facility])  - clotrimazole (LOTRIMIN) 1 % external cream; Apply topically 2 times daily     3. Moderate persistent asthma without complication  Patient currently on max dose Advair and montelukast.  Smokes 2 cigarettes daily.  However, asthma not well controlled with ACT score 14 today.  Discussed with pharmacist in preceptor and  appropriate at this time to add another medication.  -Start ipratropium-albuterol (COMBIVENT RESPIMAT)  MCG/ACT inhaler; Inhale 1 puff into the lungs 2 times daily     4.  Major depressive disorder  Patient with stable mood and medications managed by psychiatrist.  Reports no change in baseline mood and continues use of coping skills and prescribed meds.  PHQ 9 score 11 today without any SI or HI.  -Care coordination: Will reach out to patient's psychiatrist to coordinate care for this patient    Follow-up: Return in about 4 weeks (around 1/7/2021).    The patient was seen by, and discussed with, Dr. Allen Marks who agrees with the plan.  -----  Felisa Sutton MD  PGY-2  [facility]  Office: [phone]  Pager: [phone]

## 2020-12-11 ASSESSMENT — ANXIETY QUESTIONNAIRES
1. FEELING NERVOUS, ANXIOUS, OR ON EDGE: SEVERAL DAYS
7. FEELING AFRAID AS IF SOMETHING AWFUL MIGHT HAPPEN: SEVERAL DAYS
6. BECOMING EASILY ANNOYED OR IRRITABLE: MORE THAN HALF THE DAYS
2. NOT BEING ABLE TO STOP OR CONTROL WORRYING: MORE THAN HALF THE DAYS
3. WORRYING TOO MUCH ABOUT DIFFERENT THINGS: SEVERAL DAYS
GAD7 TOTAL SCORE: 9
5. BEING SO RESTLESS THAT IT IS HARD TO SIT STILL: SEVERAL DAYS
IF YOU CHECKED OFF ANY PROBLEMS ON THIS QUESTIONNAIRE, HOW DIFFICULT HAVE THESE PROBLEMS MADE IT FOR YOU TO DO YOUR WORK, TAKE CARE OF THINGS AT HOME, OR GET ALONG WITH OTHER PEOPLE: SOMEWHAT DIFFICULT

## 2020-12-11 ASSESSMENT — PATIENT HEALTH QUESTIONNAIRE - PHQ9
5. POOR APPETITE OR OVEREATING: SEVERAL DAYS
SUM OF ALL RESPONSES TO PHQ QUESTIONS 1-9: 11

## 2020-12-12 ASSESSMENT — ASTHMA QUESTIONNAIRES: ACT_TOTALSCORE: 14

## 2020-12-12 ASSESSMENT — ANXIETY QUESTIONNAIRES: GAD7 TOTAL SCORE: 9

## 2020-12-13 PROBLEM — B49 FUNGAL INFECTION: Status: RESOLVED | Noted: 2020-12-13 | Resolved: 2020-12-13

## 2020-12-13 PROBLEM — B49 FUNGAL INFECTION: Status: ACTIVE | Noted: 2020-12-13

## 2020-12-14 NOTE — TELEPHONE ENCOUNTER
Left message with [facility] noting I had not heard back from Dr. Sutton, but that we are not prescribing Zyprexa and it is not on our active medication list. Instructions to call back with questions. ./LR

## 2020-12-14 NOTE — PROGRESS NOTES
Preceptor Attestation:    Patient seen and evaluated in person. I discussed the patient with the resident. I have verified the content of the note, which accurately reflects my assessment of the patient and the plan of care.   Supervising Physician:  Radha Faria MD.

## 2020-12-22 ENCOUNTER — TELEPHONE (OUTPATIENT)
Dept: FAMILY MEDICINE | Facility: CLINIC | Age: 25
End: 2020-12-22

## 2020-12-22 NOTE — TELEPHONE ENCOUNTER
[facility] phone call message- general phone call:    Reason for call:    FYI - Pt was seen for Psychiatry, however pt already had a psychiatrist who is prescribing medication. Therefore, [facility] will be discharging for outpatient psychiatry.     Action desired:     None, FYI    Return call needed: Yes    OK to leave a message on voice mail? Yes    Advised patient to response may take up to 2 business days: Yes    Primary language: Norma      needed? Yes    Call taken on December 22, 2020 at 10:12 AM by Sujatha Woodruff CMA

## 2020-12-31 NOTE — TELEPHONE ENCOUNTER
Attempted to call pharmacy on 12/31 @ 2:30pm and call went directly to .  Will leave in inbox for PharmD to call Monday.      Phylicia Gibson Pharm.D.

## 2020-12-31 NOTE — TELEPHONE ENCOUNTER
Called back to work out who is prescribing meds to this patient. Discussed with psych RN Theron Flores.    Dr. Flores at [facility] says that pt has another psychiatrist Dr. Hernandez and that presciber is giving meds for mental health.  Has been seeing this person for years now per patient. There fore Dr. Flores will not manage meds for now.  Per jaimee RN med list at [facility] is as follows and matches our psych med list on file:    Abilify 10mg  Benztropine 1mg  prozac 10mg  Hydroxyzine 50 mg     ---- discrepancy--  Quetiapine 300 mg at bedtime --- this medication was discontinued in April 2020      Best way to communicate with [facility] is via calling them at [phone] or faxing documents to [phone] Attn: Theron Flores RN    -----  Felisa Sutton MD  PGY-2  [facility]  Office: [phone]  Pager: [phone]    --------  Addendum 1/5/2021 - -- called [facility] back---  After Dr. Esperanza Gomez called [facility] pharmacy, discovered that 1 time dose of 200mg quetiapine was given to patient once in September.    Per  Dr. Flores's notes, pt should NOT be on quetiapine.  That has been discontinued and he should be on abilify.    Per , pt only sees Psychiatrist at [facility] Dr. Flores and [facility].  At follow-up appt on Friday 1/8/21, we will review pt's meds and ensure that only psychiatrist Dr. Flores is prescribing psych meds.   Current meds will be updated in Epic.    Abilify 10mg  Benztropine 1mg  prozac 10mg  Hydroxyzine 50 mg

## 2021-01-08 ENCOUNTER — VIRTUAL VISIT (OUTPATIENT)
Dept: FAMILY MEDICINE | Facility: CLINIC | Age: 26
End: 2021-01-08
Payer: COMMERCIAL

## 2021-01-08 DIAGNOSIS — Z20.822 ENCOUNTER FOR LABORATORY TESTING FOR COVID-19 VIRUS: ICD-10-CM

## 2021-01-08 DIAGNOSIS — R05.9 COUGH: ICD-10-CM

## 2021-01-08 DIAGNOSIS — R05.9 COUGH: Primary | ICD-10-CM

## 2021-01-08 DIAGNOSIS — F33.1 MAJOR DEPRESSIVE DISORDER, RECURRENT EPISODE, MODERATE (H): ICD-10-CM

## 2021-01-08 DIAGNOSIS — J45.41 MODERATE PERSISTENT ASTHMA WITH ACUTE EXACERBATION: ICD-10-CM

## 2021-01-08 DIAGNOSIS — Z20.822 ENCOUNTER FOR LABORATORY TESTING FOR COVID-19 VIRUS: Primary | ICD-10-CM

## 2021-01-08 DIAGNOSIS — F17.200 TOBACCO USE DISORDER: ICD-10-CM

## 2021-01-08 LAB
SARS-COV-2 RNA RESP QL NAA+PROBE: NOT DETECTED
SPECIMEN SOURCE: NORMAL

## 2021-01-08 PROCEDURE — 87635 SARS-COV-2 COVID-19 AMP PRB: CPT | Performed by: STUDENT IN AN ORGANIZED HEALTH CARE EDUCATION/TRAINING PROGRAM

## 2021-01-08 PROCEDURE — 99207 PR NO CHARGE LOS: CPT | Performed by: STUDENT IN AN ORGANIZED HEALTH CARE EDUCATION/TRAINING PROGRAM

## 2021-01-08 PROCEDURE — 99213 OFFICE O/P EST LOW 20 MIN: CPT | Mod: CS | Performed by: STUDENT IN AN ORGANIZED HEALTH CARE EDUCATION/TRAINING PROGRAM

## 2021-01-08 RX ORDER — POLYETHYLENE GLYCOL 3350 17 G
2 POWDER IN PACKET (EA) ORAL
Qty: 27 LOZENGE | Refills: 1 | Status: SHIPPED | OUTPATIENT
Start: 2021-01-08 | End: 2021-02-18

## 2021-01-08 RX ORDER — PREDNISONE 20 MG/1
40 TABLET ORAL DAILY
Qty: 10 TABLET | Refills: 0 | Status: SHIPPED | OUTPATIENT
Start: 2021-01-08 | End: 2021-01-13

## 2021-01-08 ASSESSMENT — ENCOUNTER SYMPTOMS
COUGH: 1
FATIGUE: 0
WHEEZING: 1
FEVER: 0
MUSCULOSKELETAL NEGATIVE: 1
PALPITATIONS: 0
DYSPHORIC MOOD: 1
CHEST TIGHTNESS: 0
SLEEP DISTURBANCE: 0
SHORTNESS OF BREATH: 0
NAUSEA: 0
CHILLS: 0
APPETITE CHANGE: 0
VOMITING: 0

## 2021-01-08 ASSESSMENT — PATIENT HEALTH QUESTIONNAIRE - PHQ9: SUM OF ALL RESPONSES TO PHQ QUESTIONS 1-9: 20

## 2021-01-08 NOTE — PROGRESS NOTES
COVID-19 PCR test completed. Patient handout For Patients Who Have Been Tested for Covid-19 (Coronavirus) was given to the patient, which includes test result notification process.    Teresita Pitts MD PGY-2  [facility]

## 2021-01-08 NOTE — NURSING NOTE
Due to patient being non-English speaking/uses sign language, an  was used for this visit. Only for face-to-face interpretation by an external agency, date and length of interpretation can be found on the scanned worksheet.     name: Steve love   Agency: [employer]  Language: Noram   Telephone number: [phone]  Type of interpretation: Telephone, spoken

## 2021-01-08 NOTE — PROGRESS NOTES
[facility] - telephone Visit         SUBJECTIVE        Mira is a 25 year old who is being evaluated via a billable telephone visit.      What phone number would you like to be contacted at? [phone]  How would you like to obtain your AVS? Mail a copy    Chief Complaint   Patient presents with     Telephone     F/U talk about asthma and Mental Health per pt        Finger is much better - has been using prescribed cream    Asthma - not going so well. Feels like its worse int he past 2 weeks.  Has  cough, wheezing, and stuffy nose.  Uses rescue inhaler when wheezing.  Sometimes q2-3 hrs, then has to use it again around the clock.  Denies fever, chills, nausea, or vomiting. Denies any known exposure to COVID-19.    Tobacco smoking - still 2 -3 cigs daily, nicorette gum is not really working for him.  Open to other pharmacological help.    Mental health  - pt reports that mood is the same as usual occasional ups and downs. Denies suicidal ideation.      A [employer]  was used for this visit.  PMH, Medications and Allergies were reviewed and updated as needed.        REVIEW OF SYSTEMS     Review of Systems   Constitutional: Negative for appetite change, chills, fatigue and fever.   Respiratory: Positive for cough and wheezing. Negative for chest tightness and shortness of breath.    Cardiovascular: Negative for chest pain and palpitations.   Gastrointestinal: Negative for nausea and vomiting.   Musculoskeletal: Negative.    Psychiatric/Behavioral: Positive for dysphoric mood. Negative for sleep disturbance and suicidal ideas.          OBJECTIVE     There were no vitals taken for this visit.   There is no height or weight on file to calculate BMI.    Physical Exam  Neurological:      Mental Status: He is alert and oriented to person, place, and time.   Psychiatric:         Mood and Affect: Mood normal.         Thought Content: Thought content normal.       No results found for any visits on  01/08/21.    ASSESSMENT AND PLAN     1. Cough  2. Moderate persistent asthma with acute exacerbation  Pt with 2 weeks of cough and other symptoms consistent with acute exacerbation of his chronic asthma.  Exacerbation possibly triggered by viral URI - cannot rule out COVID-19 given pandemic.  Other likely triggers include recent cold weather or patient's ongoing smoking.  No constitutional symptoms.  - Symptomatic COVID-19 Virus (Coronavirus) by PCR  - predniSONE (DELTASONE) 20 MG tablet; Take 2 tablets (40 mg) by mouth daily for 5 days      3. Tobacco use disorder  Has not had much success with nicotine gum.  Willing to try pharmacological therapy, but given multiple psychiatric meds, will discuss with psychiatrist, what meds we could use to reduce cravings.  - nicotine (COMMIT) 2 MG lozenge; Place 1 lozenge (2 mg) inside cheek every hour as needed for smoking cessation  Dispense: 27 lozenge; Refill: 1    4. Major depressive disorder, recurrent episode, moderate (H)  Pt with chronic MDD.  Mood is the same as prior.  No SI.    - Reviewed meds with psychiatrist and now pt's med list is updated.  Pt will review his meds at home and stop taking any meds that are not on this list  - Medication reconciliation with pt - given list today - will bring bottles to next visit  - follow up with therapist and psychiatrist soon, as scheduled    Phone visit took: 20 mins    Follow-up: Return in about 1 week (around 1/15/2021) for Follow up, using a video visit.    The patient was seen by, and discussed with, Dr. Iker Wang who agrees with the plan.  -----  Felisa Sutton MD  PGY-2  [facility]  Office: [phone]  Pager: [phone]

## 2021-01-10 ENCOUNTER — TELEPHONE (OUTPATIENT)
Dept: FAMILY MEDICINE | Facility: CLINIC | Age: 26
End: 2021-01-10

## 2021-01-10 NOTE — TELEPHONE ENCOUNTER
Informed patient of negative COVID-19 results.    If patient had close exposure to someone with known COVID-19 (within 6 ft >15 min), provide follow isolation instructions based on patient's exposure, test, and work. Day of test is considered day 0.      You should stay away from others for 14 days if:  Someone in your home has COVID-19.  You live in a building with other people, where it s hard to stay away from others and easy to spread the virus to multiple people, like a long-term care facility.          You may consider being around others after 10 days if:  You do not have any symptoms.  You have not had a positive test for COVID-19.  No one in your home has COVID-19, and you do not live in a building with other people, where it s hard to stay away from others and easy to spread the virus to multiple people, like a long-term care facility.    Even after 10 days you must still:  Watch for symptoms through day 14. If you have any symptoms, stay home, separate yourself from others, and get tested right away.  Continue to wear a mask and stay at least 6 feet away from other people.          You may consider being around others after seven days only if:  You get tested for COVID-19 at least five full days after you had close contact with someone with COVID-19, and the test is negative.  You do not have any symptoms.  You have not had a positive test for COVID-19.  No one in your home has COVID-19, and you do not live in a building with other people, where it s hard to stay away from others and easy to spread the virus to multiple people, like a long-term care facility.    Even after seven days you must still:  Watch for symptoms through day 14. If you have any symptoms, stay home, separate yourself from others, and get tested right away.  Continue to wear a mask and stay at least 6 feet away from other people.        If patient continues to have ongoing exposure to someone with known COVID-19 (e.g. parent,  caretaker), patient needs to quarantine for 14 days after the last exposure to this person during their (the exposure's) 10 days in quarantine. As long as the exposure has no prolonged fever or symptoms (which can extend quarantine time), patient will have to isolate for:    10 days + 14 days after date of exposure's initial symptoms for those exposed to symptomatic patients  10 days + 14 days after date of exposure's positive test for those exposed to asymptomatic patients      If patient was symptomatic at time of testing and remains symptomatic for >72 hours after time of test, can repeat COVID-19 testing.              Teresita Pitts MD PGY-2  [facility]

## 2021-01-21 ENCOUNTER — VIRTUAL VISIT (OUTPATIENT)
Dept: FAMILY MEDICINE | Facility: CLINIC | Age: 26
End: 2021-01-21
Payer: COMMERCIAL

## 2021-01-21 DIAGNOSIS — J45.40 MODERATE PERSISTENT ASTHMA WITHOUT COMPLICATION: ICD-10-CM

## 2021-01-21 DIAGNOSIS — F33.1 MAJOR DEPRESSIVE DISORDER, RECURRENT EPISODE, MODERATE (H): Primary | ICD-10-CM

## 2021-01-21 PROCEDURE — 99213 OFFICE O/P EST LOW 20 MIN: CPT | Mod: 95 | Performed by: STUDENT IN AN ORGANIZED HEALTH CARE EDUCATION/TRAINING PROGRAM

## 2021-01-21 ASSESSMENT — PATIENT HEALTH QUESTIONNAIRE - PHQ9: SUM OF ALL RESPONSES TO PHQ QUESTIONS 1-9: 19

## 2021-01-21 ASSESSMENT — ENCOUNTER SYMPTOMS
WHEEZING: 0
NERVOUS/ANXIOUS: 0
PALPITATIONS: 0
SLEEP DISTURBANCE: 0
DYSPHORIC MOOD: 1
CHEST TIGHTNESS: 0
SHORTNESS OF BREATH: 0
COUGH: 0

## 2021-01-21 NOTE — PROGRESS NOTES
After Visit Summary   11/26/2017    Karis Youssef    MRN: [id number]           Patient Information     Date Of Birth          1952        Visit Information        Provider Department      11/26/2017 11:00 AM Julisa Huang MD [facility]        Today's Diagnoses     Paronychia of left index finger    -  1       Follow-ups after your visit        Follow-up notes from your care team     Return if symptoms worsen or fail to improve.      Your next 10 appointments already scheduled     Jan 11, 2018 11:00 AM CST   [facility] Visit with [facility] LAB   [facility]    [id number] [place] [id number] [phone]           Bring a current list of meds and any records pertaining to this visit. For Physicals, please bring immunization records and any forms needing to be filled out. Please arrive 10 minutes early to complete paperwork.            Jan 11, 2018  1:00 PM CST   New Visit with Miguelina Green MD   [facility])    [address] [place] [id number] [phone]              Who to contact     If you have questions or need follow up information about today's clinic visit or your schedule please contact [facility] directly at [phone].  Normal or non-critical lab and imaging results will be communicated to you by MyChart, letter or phone within 4 business days after the clinic has received the results. If you do not hear from us within 7 days, please contact the clinic through MyChart or phone. If you have a critical or abnormal lab result, we will notify you by phone as soon as possible.  Submit refill requests through [employer] or call your pharmacy and they will forward the refill request to us. Please allow 3 business days for your refill to be completed.          Additional Information About Your Visit        MyChart Information   Preceptor Attestation:    I talked to the patient on the phone and discussed the patient with the resident. I have verified the content of the note, which accurately reflects my assessment of the patient and the plan of care.   Supervising Physician:  Allen Marks MD.      [employer] gives you secure access to your electronic health record. If you see a primary care provider, you can also send messages to your care team and make appointments. If you have questions, please call your primary care clinic.  If you do not have a primary care provider, please call [phone] and they will assist you.        Care EveryWhere ID     This is your Care EveryWhere ID. This could be used by other organizations to access your [place] medical records  [id number]        Your Vitals Were     Pulse Temperature Pulse Oximetry BMI (Body Mass Index)          79 97  F (36.1  C) (Oral) 97% 32.75 kg/m2         Blood Pressure from Last 3 Encounters:   11/26/17 129/77   08/14/17 111/69   08/10/17 116/71    Weight from Last 3 Encounters:   11/26/17 206 lb (93.4 kg)   08/14/17 199 lb (90.3 kg)   08/10/17 199 lb 12.8 oz (90.6 kg)              We Performed the Following     DRAIN SKIN ABSCESS SIMPLE/SINGLE          Today's Medication Changes          These changes are accurate as of: 11/26/17 11:42 AM.  If you have any questions, ask your nurse or doctor.               Start taking these medicines.        Dose/Directions    cephALEXin 500 MG capsule   Commonly known as:  KEFLEX   Used for:  Paronychia of left index finger        Dose:  500 mg   Take 1 capsule (500 mg) by mouth 3 times daily for 7 days   Quantity:  21 capsule   Refills:  0            Where to get your medicines      These medications were sent to [facility]/pharmacy #[id number] - [place] - [address] [place] [place] [address] [place] [address]     Phone:  [phone]     cephALEXin 500 MG capsule                Primary Care Provider Office Phone # Fax #    Josee Carpenter [phone] [address] [place] [address]        Equal Access to Services     ADELINA DUQUE AH: Delphine Juarez, waaxda luqadaha, qaybta kaaljoshua stallworth, otto mayers  ah. So [facility] [phone].    ATENCIÓN: Si jovana das, tiene a lewis disposición servicios gratuitos de asistencia lingüística. Jermain al [phone].    We comply with applicable federal civil rights laws and [place] laws. We do not discriminate on the basis of race, color, national origin, age, disability, sex, sexual orientation, or gender identity.            Thank you!     Thank you for choosing [facility]  [facility] your [facility]. Our goal is always to provide you with excellent care. Hearing back from our patients is one way we can continue to improve our services. Please take a few minutes to complete the written survey that you may receive in the mail after your visit with us. Thank you!             Your Updated Medication List - Protect others around you: Learn how to safely use, store and throw away your medicines at www.disposemymeds.org.          This list is accurate as of: 11/26/17 11:42 AM.  Always use your most recent med list.                   Brand Name Dispense Instructions for use Diagnosis    ascorbic acid 500 MG tablet    VITAMIN C     1 TABLET  DAILY    Sicca (H)       CALCIUM 600 + D PO      Take by mouth daily        carboxymethylcellulose 1 % ophthalmic solution    CELLUVISC/REFRESH LIQUIGEL     Place 1 drop into both eyes 3 times daily        cephALEXin 500 MG capsule    KEFLEX    21 capsule    Take 1 capsule (500 mg) by mouth 3 times daily for 7 days    Paronychia of left index finger       cetirizine 10 MG tablet    zyrTEC     Take 10 mg by mouth daily        clobetasol 0.05 % cream    TEMOVATE     Apply topically 2 times daily        diclofenac 1 % Gel topical gel    VOLTAREN    100 g    Place onto the skin 4 times daily    Primary osteoarthritis of both knees       KRILL OIL PO           SUDAFED 30 MG tablet   Generic drug:  pseudoePHEDrine      1 TABLET EVERY 4 TO 6 HOURS AS NEEDED    Sicca (H)       TYLENOL PO      Take 325 mg by mouth        vitamin E 400 UNIT capsule      1  CAPSULE DAILY    Sicca (H)

## 2021-01-21 NOTE — PROGRESS NOTES
[facility] - Telephone visit         YRIS Segundo is a 25 year old  male with a who is being evaluated for billable phone visit.  Chief Complaint   Patient presents with     Telephone     Asthma/ Mental Health per pt      Mood:  Not so great.  Not sleeping at all recently. Says meds help him sleep,but not helping much with mood.  Current psych meds include aripiprazole,  benztropine, hydroxyzine, fluoxetine.  States that he has sad feelings almost everyday and last almost all day.  Not currently working, tries to stay busy at home.  Has not seen psychiatrist in about 1.5 months.  Has not been going to therapy for quite some time.   Was trying to manage at home on his own.  Still not interested in resuming therapy.    PHQ 12/11/2020 1/8/2021 1/21/2021   PHQ-9 Total Score 11 20 19   Q9: Thoughts of better off dead/self-harm past 2 weeks Not at all Not at all Not at all   F/U: Thoughts of suicide or self-harm - - -   F/U: Self harm-plan - - -   F/U: Self-harm action - - -   F/U: Safety concerns - - -       Asthma: Was given prescription for  5 days of prednisone which helped a lot. Breathing getting better.  Meds include cetirizine, albuterol, advair, combivert, and  Montelukast.  Asked about use of inhalers.  Using all inhalers exactly as prescribed.  Feels that breathing is much better - feels like current regimen is good as is.  Does not want to change.   Last used 3 days ago.    A [employer]  was used for this visit.    Review of Systems   Respiratory: Negative for cough, chest tightness, shortness of breath and wheezing.    Cardiovascular: Negative for chest pain and palpitations.   Psychiatric/Behavioral: Positive for dysphoric mood. Negative for behavioral problems, sleep disturbance and suicidal ideas. The patient is not nervous/anxious.           OBJECTIVE     There were no vitals taken for this visit.     Physical Exam  Constitutional:       General: He is not in acute  distress.  Neurological:      Mental Status: He is alert and oriented to person, place, and time. Mental status is at baseline.       ASSESSMENT AND PLAN     1. Major depressive disorder, recurrent episode, moderate (H)  Dysphoric mood, PHQ9 score is 19.  No SI.  Has not seen psychiatrist in 1.5 months.  Trying coping skills at home.  Sleeping well.  - make appt with psychiatrist for  pt  - therapy -pt no interested at this time  - continue current meds: aripiprazole,  benztropine, hydroxyzine, fluoxetine which are managed by primary psychiatrist  - will discuss with, share notes with psychiatrist office    2. Moderate persistent asthma without complication  Recent acute exacerbation with use of prednisone burst.  Now back to baseline.  Last use of rescue inhaler 3 days ago  -  Continue current medications:  cetirizine, albuterol, advair, combivert, and  Montelukast  -  Follow-up in 4 weeks or sooner if another exacerbation    Follow-up: Return in 4 weeks (on 2/18/2021) for for asthma, in person follow-up at 2:10pm.  AVS: to be mailed    Phone visit took 26 minutes.    The patient was seen by, and discussed with, Dr. Allen Marks who agrees with the plan.  -----  Felisa Sutton MD  PGY-2  [facility]  Office: [phone]  Pager: [phone]

## 2021-01-21 NOTE — Clinical Note
Neil Guerra, please help this pt make follow-up appt with [facility] for depression.  Contact info, now in Treatment Team in [facility].  Then call Steve Pereyra to let pt know.  Also fax my note from today to them if they want it.  Please and thanks.  May conduct call within this encounter.

## 2021-01-21 NOTE — NURSING NOTE
Due to patient being non-English speaking/uses sign language, an  was used for this visit. Only for face-to-face interpretation by an external agency, date and length of interpretation can be found on the scanned worksheet.     name: Steve Pereyra  Agency: [employer]  Language: Norma   Telephone number: [phone]  Type of interpretation: Telephone, spoken

## 2021-01-27 ENCOUNTER — TELEPHONE (OUTPATIENT)
Dept: FAMILY MEDICINE | Facility: CLINIC | Age: 26
End: 2021-01-27

## 2021-01-27 NOTE — TELEPHONE ENCOUNTER
Felisa Sutton MD Trevino, Cynthia, [place] [employer] Mari, please help this pt make follow-up appt with [facility] for depression.  Contact info, now in Treatment Team in [facility].  Then call Steve Pereyra to let pt know.  Also fax my note from today to them if they want it.   Please and thanks.  May conduct call within this encounter.

## 2021-01-27 NOTE — TELEPHONE ENCOUNTER
This SW reached out to [facility] at . Was transferred to a care coordinator through the ACT Team at [facility] and left a message. This SW provided patient's information and informed that he wants to schedule a f/u visit with his psychiatry there, Dr. Flores. SW asked for a return call to assist with coordinating this appointment for patient.     SW will f/u on this in 3 days if no return call is made.     RAYNA Jasmine

## 2021-02-01 ENCOUNTER — TELEPHONE (OUTPATIENT)
Dept: FAMILY MEDICINE | Facility: CLINIC | Age: 26
End: 2021-02-01

## 2021-02-01 DIAGNOSIS — F33.1 MAJOR DEPRESSIVE DISORDER, RECURRENT EPISODE, MODERATE (H): ICD-10-CM

## 2021-02-01 NOTE — TELEPHONE ENCOUNTER
"[facility] phone call message- medication clarification/question:    Full Medication Name:     \"All medications\"    Question:     Wa calling to confirm pt's medications. He statees Dr. Sutton and him have been in touch with pt's meds and he is needing a call back to discuss which medications the pt should still be on        OK to leave a message on voice mail? Yes    Primary language: Norma      needed? Yes    Call taken on February 1, 2021 at 12:30 PM by Sujatha Woodruff CMA    "

## 2021-02-01 NOTE — TELEPHONE ENCOUNTER
This SW recieved a call back from a  at [facility]. They inform that Dr. Flores's next opening for a telephone psychiatry visit is Monday, February 15th at 10:30am. SW agreed to that visit time for patient as it is the soonest available. They will provide a Norma  for the visit.     SW took this opportunity to update patient's care teams. Schedule at [facility] confirms that patient does not have any therapy active with [facility]. SW removed the therapist and CD counselor from the care teams. JUAN M added a Mental Health , that [facility] informs is the active  for this patient.     RAYNA Jasmine

## 2021-02-01 NOTE — TELEPHONE ENCOUNTER
This SW utilized Language Soraida Green  to reach out to patient and inform of the appointment. SW was able to connect with patient. He indicates understanding and will plan to attend that visit, via phone.     SW will call patient 2 business days prior to the appointment to remind him.     RAYNA Jasmine

## 2021-02-05 DIAGNOSIS — Z00.00 PREVENTATIVE HEALTH CARE: ICD-10-CM

## 2021-02-08 NOTE — TELEPHONE ENCOUNTER
He is calling back he has been waiting a week for a phone call back He needs to speak with  . His cellphone [phone] just in case.

## 2021-02-12 RX ORDER — ACETAMINOPHEN 500 MG
500-1000 TABLET ORAL EVERY 6 HOURS PRN
Qty: 90 TABLET | Refills: 1 | Status: SHIPPED | OUTPATIENT
Start: 2021-02-12 | End: 2021-05-09

## 2021-02-12 NOTE — TELEPHONE ENCOUNTER
SW worked with Norma , Steve Pereyra, and reached out to patient to remind him of the psychiatry appointment, w/ [facility], on Monday. Mira Segundo answered. He indicates understanding and will plan to attend that visit, via telephone visit.     RAYNA Jasmine

## 2021-02-18 ENCOUNTER — OFFICE VISIT (OUTPATIENT)
Dept: FAMILY MEDICINE | Facility: CLINIC | Age: 26
End: 2021-02-18
Payer: COMMERCIAL

## 2021-02-18 VITALS
SYSTOLIC BLOOD PRESSURE: 122 MMHG | DIASTOLIC BLOOD PRESSURE: 82 MMHG | OXYGEN SATURATION: 99 % | BODY MASS INDEX: 23.67 KG/M2 | RESPIRATION RATE: 20 BRPM | TEMPERATURE: 98.4 F | HEART RATE: 98 BPM | WEIGHT: 133.6 LBS

## 2021-02-18 DIAGNOSIS — J45.40 MODERATE PERSISTENT ASTHMA WITHOUT COMPLICATION: Primary | ICD-10-CM

## 2021-02-18 DIAGNOSIS — F33.1 MAJOR DEPRESSIVE DISORDER, RECURRENT EPISODE, MODERATE (H): ICD-10-CM

## 2021-02-18 DIAGNOSIS — Z23 NEED FOR VACCINATION: ICD-10-CM

## 2021-02-18 DIAGNOSIS — Z72.0 TOBACCO ABUSE: ICD-10-CM

## 2021-02-18 PROCEDURE — 90471 IMMUNIZATION ADMIN: CPT | Performed by: STUDENT IN AN ORGANIZED HEALTH CARE EDUCATION/TRAINING PROGRAM

## 2021-02-18 PROCEDURE — 90651 9VHPV VACCINE 2/3 DOSE IM: CPT | Performed by: STUDENT IN AN ORGANIZED HEALTH CARE EDUCATION/TRAINING PROGRAM

## 2021-02-18 PROCEDURE — 99214 OFFICE O/P EST MOD 30 MIN: CPT | Mod: 25 | Performed by: STUDENT IN AN ORGANIZED HEALTH CARE EDUCATION/TRAINING PROGRAM

## 2021-02-18 RX ORDER — POLYETHYLENE GLYCOL 3350 17 G
2 POWDER IN PACKET (EA) ORAL
Qty: 27 LOZENGE | Refills: 1 | Status: SHIPPED | OUTPATIENT
Start: 2021-02-18 | End: 2021-05-24

## 2021-02-18 RX ORDER — CETIRIZINE HYDROCHLORIDE 10 MG/1
10 TABLET ORAL DAILY
Qty: 60 TABLET | Refills: 3 | Status: SHIPPED | OUTPATIENT
Start: 2021-02-18 | End: 2021-06-28

## 2021-02-18 RX ORDER — MONTELUKAST SODIUM 10 MG/1
10 TABLET ORAL AT BEDTIME
Qty: 60 TABLET | Refills: 3 | Status: SHIPPED | OUTPATIENT
Start: 2021-02-18 | End: 2021-05-31

## 2021-02-18 RX ORDER — ALBUTEROL SULFATE 90 UG/1
2 AEROSOL, METERED RESPIRATORY (INHALATION) EVERY 6 HOURS PRN
Qty: 2 INHALER | Refills: 3 | Status: SHIPPED | OUTPATIENT
Start: 2021-02-18 | End: 2022-06-03

## 2021-02-18 RX ORDER — FLUTICASONE PROPIONATE AND SALMETEROL XINAFOATE 230; 21 UG/1; UG/1
2 AEROSOL, METERED RESPIRATORY (INHALATION) 2 TIMES DAILY
Qty: 12 G | Refills: 4 | Status: SHIPPED | OUTPATIENT
Start: 2021-02-18 | End: 2021-07-13

## 2021-02-18 ASSESSMENT — ENCOUNTER SYMPTOMS
SHORTNESS OF BREATH: 0
HEADACHES: 0
DYSPHORIC MOOD: 1
CHEST TIGHTNESS: 0
COUGH: 0
STRIDOR: 0
WHEEZING: 0

## 2021-02-18 ASSESSMENT — PATIENT HEALTH QUESTIONNAIRE - PHQ9: SUM OF ALL RESPONSES TO PHQ QUESTIONS 1-9: 13

## 2021-02-18 NOTE — Clinical Note
Heonel Sandra,  Could you reach out to the patient's psychiatry office and find out: if they can fax us the latest note from the visit today OR send me the list of meds he's on now - I know they changed one of his meds.    Best way to communicate with [facility] psychiat[facility] is via calling them at [phone]  and ask  for THUAN Flores  ---fax # [phone]

## 2021-02-18 NOTE — ASSESSMENT & PLAN NOTE
Pt with persistent symptoms, recent exacerbation treated with prednisone.  Feels that asthma symptoms improved since then.  Smoking 2 cigs daily (was 3)  However, ACT score today 15 with 2 to 3 weekly use of rescue inhaler.  Reviewed meds with patient and he is using all his meds correctly now.  Will increase dose of combivent today.   Will consider PFT in future when able (given pandemic) and if no improvement.  -    ipratropium-albuterol (COMBIVENT)  MCG/ACT inhaler; 2 puffs BID ( was using 1 puff BID)  -     montelukast (SINGULAIR) 10 MG tablet daily  -     cetirizine (ZYRTEC) 10 MG tablet daily        -     albuterol  Inhaler 2 puffs q6h prn        -     fluticasone-salmeterol (ADVAIR-HFA) 230-21 MCG/ACT inhaler; 2 puffs BID       -  Continue smoking cessation

## 2021-02-18 NOTE — PROGRESS NOTES
[facility] - Office Visit         YRIS Segundo is a 26 year old  male with a who presents to clinic today for:   Chief Complaint   Patient presents with     Asthma     f/u asthma, feel a little better and do asthma control test      Refill Request     request refill more on nicotine         Asthma: ACT score today is 15; was 14 in December 2020.  Using albuterol 2 to 3 times a week. Asthma symptoms keeping him up 2 to 3 nights per week. Suggests poor control of asthma.  Recent use of prednisone.     Smoking:  Now firmly down to 2 cigs daily.    Depression: feels it goes ups and down.  PHQ9 score is 13 today.  Open to retrying therapy.   Followed up with psychiatrist earlier today.    Psychiatrist changed his meds, and advised him to start therapy.   Sounds like they stopped one of the meds.    A [employer]  was used for this visit - Steve Pereyra in person    Review of Systems   Respiratory: Negative for cough, chest tightness, shortness of breath, wheezing and stridor.    Neurological: Negative for headaches.   Psychiatric/Behavioral: Positive for dysphoric mood.          OBJECTIVE     Blood pressure 122/82, pulse 98, temperature 98.4  F (36.9  C), temperature source Oral, resp. rate 20, weight 60.6 kg (133 lb 9.6 oz), SpO2 99 %.   Body mass index is 23.67 kg/m .    Physical Exam  Vitals signs reviewed. Exam conducted with a chaperone present.   Constitutional:       General: He is not in acute distress.     Appearance: Normal appearance. He is normal weight. He is not ill-appearing.   Cardiovascular:      Rate and Rhythm: Normal rate and regular rhythm.      Pulses: Normal pulses.      Heart sounds: Normal heart sounds. No murmur. No gallop.    Pulmonary:      Effort: Pulmonary effort is normal. No respiratory distress.      Breath sounds: Normal breath sounds. No wheezing or rales.   Abdominal:      General: Abdomen is flat.      Palpations: Abdomen is soft.   Skin:     General:  Skin is warm and dry.      Capillary Refill: Capillary refill takes less than 2 seconds.      Findings: No rash.   Neurological:      Mental Status: He is alert.       ASSESSMENT AND PLAN     Moderate persistent asthma without complication  Pt with persistent symptoms, recent exacerbation treated with prednisone.  Feels that asthma symptoms improved since then.  Smoking 2 cigs daily (was 3)  However, ACT score today 15 with 2 to 3 weekly use of rescue inhaler.  Reviewed meds with patient and he is using all his meds correctly now.  Will increase dose of combivent today.   Will consider PFT in future when able (given pandemic) and if no improvement.  -    ipratropium-albuterol (COMBIVENT)  MCG/ACT inhaler; 2 puffs BID ( was using 1 puff BID)  -     montelukast (SINGULAIR) 10 MG tablet daily  -     cetirizine (ZYRTEC) 10 MG tablet daily          -     albuterol  Inhaler 2 puffs q6h prn            -     fluticasone-salmeterol (ADVAIR-HFA) 230-21 MCG/ACT inhaler; 2 puffs BID           -  Continue smoking cessation    Tobacco abuse:  Pt down to 2 cigarettes daily (from 3) with nicotine replacement  -     nicotine (NICORETTE) 2 MG gum; Place 1 each (2 mg) inside cheek as needed for smoking cessation  -     nicotine (COMMIT) 2 MG lozenge; Place 1 lozenge (2 mg) inside cheek every hour as needed for smoking cessation    Major depressive disorder, recurrent episode, moderate: stable PHQ9 score 13, following with psychiatry  - restart therapy  As discussed with psychiatry  - request update from psychiatrist at [place]  - continue current meds - aripiprazole,  benztropine, hydroxyzine, fluoxetine managed by psych    Health maintenance; Need for vaccination  -     ADMIN VACCINE, INITIAL  -     HPV9 (Gardasil 9 )    Follow-up: Return in about 2 weeks (around 3/4/2021) for 10 am , Follow up asthma, phone visit, request Steve Pereyra.    The patient was seen by, and discussed with, Dr. Lamont Kearney who agrees with the  plan.  -----  Felisa Sutton MD  PGY-2  [facility]  Office: [phone]  Pager: [phone]

## 2021-02-18 NOTE — ASSESSMENT & PLAN NOTE
stable PHQ9 score 13, following with psychiatry  - restart therapy  As discussed with psychiatry  - request update from psychiatrist at [place]  - continue current meds: aripiprazole,  benztropine, hydroxyzine, fluoxetine which are managed by primary psychiatrist

## 2021-02-19 ASSESSMENT — ASTHMA QUESTIONNAIRES: ACT_TOTALSCORE: 15

## 2021-02-22 RX ORDER — HYDROXYZINE HYDROCHLORIDE 50 MG/1
100 TABLET, FILM COATED ORAL AT BEDTIME
COMMUNITY
Start: 2021-02-22 | End: 2022-06-03

## 2021-02-22 NOTE — PROGRESS NOTES
Theron KING RN return my call. Per Dr. Flores's note he discontinue pt's med. Will restart pt the next time he is in to see Dr. Flores again. Felicita Lira CMA

## 2021-02-22 NOTE — TELEPHONE ENCOUNTER
Called back Theron Flores RN Psychiatry at [place].  Confusion about which meds pt is taking and not taking at visit on 2/15.  Dr. Flores had follow up visit with pt on  2/18/21. Unfortunately, they are only doing virtual visits tat this time    - discontinued all antipsychotics  - start on fluoxetine 10 mg daily, hydroxyzine 100 mg at bedtime  -- only psych meds now  - will update med list  - I offered to review meds with pt in-person as needed, if psychiatrists wants to double check meds with patient  - Follow up scheduled: 3/4/2020 phone visit       Felisa Sutton MD    Medications Discontinued During This Encounter   Medication Reason     ARIPiprazole (ABILIFY) 10 MG tablet Discontinued by another Health Care Provider     benztropine (COGENTIN) 1 MG tablet Discontinued by another Health Care Provider     hydrOXYzine (ATARAX) 50 MG tablet      Current Outpatient Medications   Medication     hydrOXYzine (ATARAX) 50 MG tablet     acetaminophen (TYLENOL) 500 MG tablet     albuterol (PROAIR HFA/PROVENTIL HFA/VENTOLIN HFA) 108 (90 Base) MCG/ACT inhaler     cetirizine (ZYRTEC) 10 MG tablet     clotrimazole (LOTRIMIN) 1 % external cream     FLUoxetine (PROZAC) 10 MG capsule     fluticasone-salmeterol (ADVAIR-HFA) 230-21 MCG/ACT inhaler     ipratropium-albuterol (COMBIVENT RESPIMAT)  MCG/ACT inhaler     montelukast (SINGULAIR) 10 MG tablet     nicotine (COMMIT) 2 MG lozenge     nicotine (NICORETTE) 2 MG gum     No current facility-administered medications for this visit.

## 2021-03-04 ENCOUNTER — VIRTUAL VISIT (OUTPATIENT)
Dept: FAMILY MEDICINE | Facility: CLINIC | Age: 26
End: 2021-03-04
Payer: COMMERCIAL

## 2021-03-04 DIAGNOSIS — J45.40 MODERATE PERSISTENT ASTHMA WITHOUT COMPLICATION: Primary | ICD-10-CM

## 2021-03-04 DIAGNOSIS — F33.1 MAJOR DEPRESSIVE DISORDER, RECURRENT EPISODE, MODERATE (H): ICD-10-CM

## 2021-03-04 DIAGNOSIS — F17.200 TOBACCO USE DISORDER: ICD-10-CM

## 2021-03-04 PROCEDURE — 99213 OFFICE O/P EST LOW 20 MIN: CPT | Mod: 95 | Performed by: STUDENT IN AN ORGANIZED HEALTH CARE EDUCATION/TRAINING PROGRAM

## 2021-03-04 NOTE — NURSING NOTE
Due to patient being non-English speaking/uses sign language, an  was used for this visit. Only for face-to-face interpretation by an external agency, date and length of interpretation can be found on the scanned worksheet.       name: Steve Pereyra (Htoo)  Language: Norma  Agency:  [employer]  Phone number: [phone]  Type of interpretation:  telephone, spoken      WIL Macias  9:52 AM  3/4/2021

## 2021-03-04 NOTE — PROGRESS NOTES
SUBJECTIVE       Mira Segundo is a 26 year old  male with a who presents to clinic today for:   Chief Complaint   Patient presents with     Follow Up     Asthma, been feeling better with warm weather, ACT: 15        Asthma: ACT score today is 15; was 15 on , 14 in 2020.  Using albuterol 2 to 3 times a week. Asthma symptoms keeping him up 2 to 3 nights per week. Suggests poor control of asthma.  Recent use of prednisone in 2020.     Smokin cigarettes daily.    Depression: Pt declined PHQ-9 screening today as filled out at visit 2 weeks ago, score was 13 at that time. Followed up with psychiatrist at [place].  Psychiatrist recently changed his meds, and advised him to start therapy. Removed his antipsychotics and added fluoxetine, additionally increased hydroxyzine     A [employer]  was used for this visit - Steve Pereyra     Review of Systems   Respiratory: Negative for cough, chest tightness, shortness of breath, wheezing and stridor.    Cardio: Positive for heart palpitations - associates with anxiety and receiving bad news   Neurological: Negative for headaches.   HEENT: Negative for hearing or vision changes           OBJECTIVE      Vitals not taken as telephone visit.     Physical Exam  Physical exam not conducted as telephone visit.     ASSESSMENT AND PLAN      Moderate persistent asthma without complication  Pt feels that asthma symptoms have improved. Pt's ipratropium-albuterol inhaler was increased from 1x BID to 2x BID last visit (). Pt attributes improvement of symptoms to this increase in the ipratropium-albuterol inhaler use and warmer weather. Pt continues smoking 2 cigarettes daily (was 3).     ACT score today 15, pt last used his rescue inhaler 4 days ago.    -   Continue  ipratropium-albuterol (COMBIVENT)  MCG/ACT inhaler; 2 puffs BID  -     montelukast (SINGULAIR) 10 MG tablet daily  -     cetirizine (ZYRTEC) 10 MG tablet daily          -     albuterol  Inhaler 2  puffs q6h prn            -     fluticasone-salmeterol (ADVAIR-HFA) 230-21 MCG/ACT inhaler; 2 puffs BID           -  Continue smoking cessation   -F/u in one month unless asthma symptoms increase     Tobacco abuse:  Pt down to 2 cigarettes daily (from 3) with nicotine gum replacement, pt reports not using nicotine lozanges  -     continue nicotine gum     Major depressive disorder, recurrent episode, moderate: Declined PHQ-9 screening today as was screened at the last visit two weeks ago (2/18), following with psychiatry at [place]. We have been in contact with his team at [place]. They recently removed pts antipsychotic medications and pt is currently taking fluoxetine and hydroxyzine to manage his symptoms.  - restart therapy  As discussed with psychiatry  - request update from psychiatrist at [place]  - continue current meds - hydroxyzine, fluoxetine managed by psych, continue to appreciate excellent cares        Follow-up: Return in about 4 weeks (around 4/5/2021) for 10:20 am, Follow up asthma and depression, phone visit, request Steve Pereyra    Phone call duration: 18 minutes    Edmund Dasilva, MS4 - Patient seen and staffed with Resident Dr. Felisa Sutton MD    Resident/Fellow Attestation   I, Felisa Sutton, was present with the medical student who participated in the service and in the documentation of the note.  I have verified the history and personally performed the physical exam and medical decision making.  I agree with the assessment and plan of care as documented in the note.      Felisa Sutton MD, PGY2

## 2021-03-05 ASSESSMENT — ASTHMA QUESTIONNAIRES: ACT_TOTALSCORE: 15

## 2021-04-19 ENCOUNTER — RECORDS - HEALTHEAST (OUTPATIENT)
Dept: ADMINISTRATIVE | Facility: OTHER | Age: 26
End: 2021-04-19

## 2021-04-19 ENCOUNTER — VIRTUAL VISIT (OUTPATIENT)
Dept: FAMILY MEDICINE | Facility: CLINIC | Age: 26
End: 2021-04-19
Payer: COMMERCIAL

## 2021-04-19 DIAGNOSIS — J45.40 MODERATE PERSISTENT ASTHMA WITHOUT COMPLICATION: Primary | ICD-10-CM

## 2021-04-19 DIAGNOSIS — F33.1 MAJOR DEPRESSIVE DISORDER, RECURRENT EPISODE, MODERATE (H): ICD-10-CM

## 2021-04-19 PROCEDURE — 99214 OFFICE O/P EST MOD 30 MIN: CPT | Mod: 95 | Performed by: STUDENT IN AN ORGANIZED HEALTH CARE EDUCATION/TRAINING PROGRAM

## 2021-04-19 ASSESSMENT — ENCOUNTER SYMPTOMS
HEADACHES: 0
SHORTNESS OF BREATH: 0
NERVOUS/ANXIOUS: 0
WHEEZING: 0
DYSPHORIC MOOD: 0
CONFUSION: 0
CHEST TIGHTNESS: 0
COUGH: 0

## 2021-04-19 NOTE — ASSESSMENT & PLAN NOTE
Patient reports that mood is stable.  Previously was on multiple antipsychotics.  Per psychiatrist, patient is now only on one SSRI and feeling about the same overall with mood.  Mood varies daily.  - Continue fluoxetine 10 mg daily  - Continue follow-up with psychiatry team at [place]  - will communicate with psychiatrist team with update  
Patient with long history of asthma requiring multiple inhalers.  Over the past 3 weeks, patient did not require rescue inhaler and has been without exacerbation of asthma.  Patient also has been cutting down cigarette smoking and now smokes 1 to 2 cigarettes some days, much improved from 2 cigarettes daily.  Additionally, patient attributes improvement to weather change.  Also started on Combivent at last visit.  Patient has never had pulmonary function test to confirm diagnosis.  -     Pulmonary Function Test (PFT) Referral; Future  -   Continue as needed albuterol   - Continue daily Advair (fluticasone salmeterol) 230-21, 2 puffs twice daily  - Continue Combivent (ipratropium albuterol) , 2 puffs twice daily  - Continue montelukast 10 mg daily  -At next visit, will likely change Combivent to Spiriva 1.25 strength 2 puffs daily. Prefer to make med changes in person.  
normal...

## 2021-04-19 NOTE — PROGRESS NOTES
Mira is a 26 year old who is being evaluated via a billable telephone visit.      What phone number would you like to be contacted at? [phone]   How would you like to obtain your AVS? Mail a copy    Assessment & Plan     Moderate persistent asthma without complication  Patient with long history of asthma requiring multiple inhalers.  Over the past 3 weeks, patient did not require rescue inhaler and has been without exacerbation of asthma.  Patient also has been cutting down cigarette smoking and now smokes 1 to 2 cigarettes some days, much improved from 2 cigarettes daily.  Additionally, patient attributes improvement to weather change.  Also started on Combivent at last visit.  Patient has never had pulmonary function test to confirm diagnosis.  -     Pulmonary Function Test (PFT) Referral; Future  -   Continue as needed albuterol   - Continue daily Advair (fluticasone salmeterol) 230-21, 2 puffs twice daily  - Continue Combivent (ipratropium albuterol) , 2 puffs twice daily  - Continue montelukast 10 mg daily  -At next visit, will likely change Combivent to Spiriva 1.25 strength 2 puffs daily. Prefer to make med changes in person.    Major depressive disorder, recurrent episode, moderate (H)  Patient reports that mood is stable.  Previously was on multiple antipsychotics.  Per psychiatrist, patient is now only on one SSRI and feeling about the same overall with mood.  Mood varies daily.  - Continue fluoxetine 10 mg daily  - Continue follow-up with psychiatry team at [place]  - will communicate with psychiatrist team with update    Diagnosis or treatment significantly limited by social determinants of health - non-english speaking  Prescription drug management         Tobacco Cessation:  reports that he has been smoking cigarettes. He has never used smokeless tobacco.  Tobacco Cessation Action Plan: Pharmacotherapies : other Nicotine replacement  pt is cutting down, now 0-2 cigs daily.    Follow-up:  Return  in 5 weeks (on 5/24/2021) for at 3:10pm for asthma in person- request Steve Pereyra.    The patient was seen by, and discussed with, Dr. Bartolome Feng who agrees with the plan.  -----  Felisa Sutton MD  PGY-2  [facility]      Debbie Meyers is a 26 year old who presents for the following health issues   Chief Complaint   Patient presents with     RECHECK     f/u mood asthma     Asthma: Feels like things are better since last visit.  Has not had much asthma attack recently - symptoms improved with weather getting warmer.  Last attack was 2-3 weeks ago. Has not used rescue inhaler in  That time. Uses daily inhaler as prescribed. Smoking - 1 to 2 cigarettes daily, sometimes does not smoke at all.      Mood: goes up and down with the day.  Depends what is happening in his life. Follows with psychiatry. Overall about the same.  Is not only taking Prozac 10 mg daily, prescribed by psychiatry.             Review of Systems   HENT: Positive for congestion.    Respiratory: Negative for cough, chest tightness, shortness of breath and wheezing.    Neurological: Negative for headaches.   Psychiatric/Behavioral: Negative for confusion and dysphoric mood. The patient is not nervous/anxious.             Objective       Vitals: No vitals were obtained today due to virtual visit.    Physical Exam  Neurological:      Mental Status: He is alert and oriented to person, place, and time.   Psychiatric:         Mood and Affect: Mood normal.         Thought Content: Thought content normal.     Remainder of exam unable to be completed due to telephone visits              Phone call duration: 14 minutes

## 2021-04-20 NOTE — PROGRESS NOTES
.                             Preceptor Attestation:   I talked to the patient on the phone. I have verified the content of the note, which accurately reflects my assessment of the patient and the plan of care.   Supervising Physician:  Bartolome Feng MD.

## 2021-04-21 ENCOUNTER — RECORDS - HEALTHEAST (OUTPATIENT)
Dept: ADMINISTRATIVE | Facility: OTHER | Age: 26
End: 2021-04-21

## 2021-04-21 NOTE — PATIENT INSTRUCTIONS
04/21/21   Pulmonary Function Test   [facility]  [address]   [place] [address]  Phone: [phone]  Fax: [phone]    Fax referral, demographics, medication list and office notes to [phone]. They will contact patient to schedule.     Leandra Beckford

## 2021-04-22 ENCOUNTER — AMBULATORY - HEALTHEAST (OUTPATIENT)
Dept: PULMONOLOGY | Facility: OTHER | Age: 26
End: 2021-04-22

## 2021-04-22 DIAGNOSIS — J45.909 ASTHMA: ICD-10-CM

## 2021-05-07 DIAGNOSIS — Z00.00 PREVENTATIVE HEALTH CARE: ICD-10-CM

## 2021-05-09 RX ORDER — ACETAMINOPHEN 500 MG
500-1000 TABLET ORAL EVERY 6 HOURS PRN
Qty: 90 TABLET | Refills: 1 | Status: SHIPPED | OUTPATIENT
Start: 2021-05-09 | End: 2023-05-30

## 2021-05-24 ENCOUNTER — OFFICE VISIT (OUTPATIENT)
Dept: FAMILY MEDICINE | Facility: CLINIC | Age: 26
End: 2021-05-24
Payer: COMMERCIAL

## 2021-05-24 VITALS
HEART RATE: 81 BPM | RESPIRATION RATE: 12 BRPM | SYSTOLIC BLOOD PRESSURE: 108 MMHG | TEMPERATURE: 98.7 F | BODY MASS INDEX: 23.17 KG/M2 | OXYGEN SATURATION: 98 % | DIASTOLIC BLOOD PRESSURE: 74 MMHG | WEIGHT: 130.8 LBS

## 2021-05-24 DIAGNOSIS — F17.200 TOBACCO USE DISORDER: ICD-10-CM

## 2021-05-24 DIAGNOSIS — J45.40 MODERATE PERSISTENT ASTHMA WITHOUT COMPLICATION: Primary | ICD-10-CM

## 2021-05-24 DIAGNOSIS — F33.1 MAJOR DEPRESSIVE DISORDER, RECURRENT EPISODE, MODERATE (H): ICD-10-CM

## 2021-05-24 PROCEDURE — 99214 OFFICE O/P EST MOD 30 MIN: CPT | Mod: GC | Performed by: STUDENT IN AN ORGANIZED HEALTH CARE EDUCATION/TRAINING PROGRAM

## 2021-05-24 ASSESSMENT — ENCOUNTER SYMPTOMS
WHEEZING: 1
NAUSEA: 0
COUGH: 1
CHILLS: 0
SHORTNESS OF BREATH: 1
HEADACHES: 0
VOMITING: 0
DIZZINESS: 0
FEVER: 0

## 2021-05-24 ASSESSMENT — PATIENT HEALTH QUESTIONNAIRE - PHQ9: SUM OF ALL RESPONSES TO PHQ QUESTIONS 1-9: 12

## 2021-05-24 NOTE — PROGRESS NOTES
Assessment & Plan     Moderate persistent asthma without complication  Patient reports that his asthma is somewhat improved than at baseline.  He has had multiple recent exacerbations due to exercise, and increased pollen outside.  Going outside helps with his depression, but causes him to have wheezing.  Uses rescue inhaler with good relief.  Uses all medications as prescribed.  Has decreased tobacco use significantly.  ACT score is 15.  No exacerbation today, lungs clear on exam.  - PFT ordered at last visit, scheduled for next month--patient has never had a PFT  - Use albuterol inhaler before going outside --prophylactic  - Discontinue ipratropium-albuterol (Combivent)  - Start tiotropium (SPIRIVA RESPIMAT) 2.5 MCG/ACT inhaler --discussed with clinic pharmacist and this will optimize asthma treatment  - Continue fluticasone salmeterol (Advair) 230-21 2 puffs twice daily; montelukast 10 mg daily, cetirizine 10 mg daily, & albuterol as needed for wheezing or shortness of breath  --Given first dose of COVID-19 vaccine in clinic today      Tobacco use disorder  Patient is now down to 1 cigarette every day or every other day.  This is significant improvement from prior rate of 2 to 3 cigarettes daily.  Patient had achieved his goal on tobacco reduction.  -Congratulated and encourage patient to continue  -Continue nicotine gum as needed    Major depressive disorder, recurrent episode, moderate (H)  PHQ-9 score of 12 today.  Patient reports mood is stable, no SI.  Currently taking fluoxetine, which is prescribed by psychiatrist.  -Follow-up with psychiatry as scheduled       Follow-up:  Return in about 4 weeks (around 6/21/2021) for asthma, COVID vaccine #2.    The patient was seen by, and discussed with, Dr. Ren Ralph who agrees with the plan.  -----  Felisa Sutton MD  PGY-2  Medical student Sofi Tolbert, MS4 was present for this visit  [facility]      Subjective   Mira is a 26 year old  who presents for the following health issues  accompanied by his :  Chief Complaint   Patient presents with     Follow Up     Asthma     Mira reports that he believes that is  Slightly improved from what it has been, however it still significantly impacts his life. Lately, he has been getting more symptoms from being outside, and reports that 2 days ago he had an asthma attack from being at a BBQ with smoke. He reports that his albuterol and other inhalers helped with this attack. Prior to this, he had another attack about 1.5-2 weeks ago. When he isn't having attacks, he only needs to use his albuterol 2-3 times a week. He reports waking up short of breath 1-2 times a week. His CAT score today is 15. Mira reports that he has reduced his smoking to 1 cigarette every day or every other day. He still has not undergone PFTs but he has an appointment to get them done next month.    Mira reports that his mood is about the same. He has good days and down days. He reports that being outside has helped with his mood, but worsens his asthma. He has a follow-up appointment scheduled with his psychiatrist later this month.         Review of Systems   Constitutional: Negative for chills and fever.   HENT: Negative for congestion.    Respiratory: Positive for cough, shortness of breath and wheezing.    Cardiovascular: Negative for chest pain.   Gastrointestinal: Negative for nausea and vomiting.   Neurological: Negative for dizziness and headaches.   Psychiatric/Behavioral: Negative for mood changes and suicidal ideas.      ACT Total Scores 2/18/2021 3/4/2021 5/24/2021   ACT TOTAL SCORE - - -   ASTHMA ER VISITS - - -   ASTHMA HOSPITALIZATIONS - - -   ACT TOTAL SCORE (Goal Greater than or Equal to 20) 15 15 15   In the past 12 months, how many times did you visit the emergency room for your asthma without being admitted to the hospital? 0 0 0   In the past 12 months, how many times were you hospitalized overnight because of  your asthma? 0 0 0     PHQ 2/18/2021 4/19/2021 5/24/2021   PHQ-9 Total Score 13 - 12   Q9: Thoughts of better off dead/self-harm past 2 weeks Not at all Not at all Not at all   F/U: Thoughts of suicide or self-harm - - -   F/U: Self harm-plan - - -   F/U: Self-harm action - - -   F/U: Safety concerns - - -         Objective    /74 (BP Location: Left arm, Patient Position: Sitting, Cuff Size: Adult Regular)   Pulse 81   Temp 98.7  F (37.1  C) (Oral)   Resp 12   Wt 59.3 kg (130 lb 12.8 oz)   SpO2 98%   BMI 23.17 kg/m    Body mass index is 23.17 kg/m .  Physical Exam  Constitutional:       Appearance: Normal appearance.   HENT:      Head: Normocephalic and atraumatic.   Eyes:      Extraocular Movements: Extraocular movements intact.      Pupils: Pupils are equal, round, and reactive to light.   Cardiovascular:      Rate and Rhythm: Normal rate and regular rhythm.   Pulmonary:      Effort: Pulmonary effort is normal.      Breath sounds: Normal breath sounds.   Neurological:      Mental Status: He is alert.

## 2021-05-24 NOTE — NURSING NOTE
Due to patient being non-English speaking/uses sign language, an  was used for this visit. Only for face-to-face interpretation by an external agency, date and length of interpretation can be found on the scanned worksheet.       name: Steve Pereyra (Htoo)  Language: Norma  Agency:  [employer]  Phone number: [phone]  Type of interpretation:  Face-to-face, spoken

## 2021-05-24 NOTE — PROGRESS NOTES
Preceptor Attestation:    I discussed the patient with the resident and evaluated the patient in person. I have verified the content of the note, which accurately reflects my assessment of the patient and the plan of care.   Supervising Physician:  Ren Ralph MD.

## 2021-05-25 ENCOUNTER — IMMUNIZATION (OUTPATIENT)
Dept: FAMILY MEDICINE | Facility: CLINIC | Age: 26
End: 2021-05-25
Payer: COMMERCIAL

## 2021-05-25 PROCEDURE — 91301 PR COVID VAC MODERNA 100 MCG/0.5 ML IM: CPT

## 2021-05-25 PROCEDURE — 0011A PR COVID VAC MODERNA 100 MCG/0.5 ML IM: CPT

## 2021-05-25 ASSESSMENT — ASTHMA QUESTIONNAIRES: ACT_TOTALSCORE: 15

## 2021-05-27 DIAGNOSIS — J45.40 MODERATE PERSISTENT ASTHMA WITHOUT COMPLICATION: ICD-10-CM

## 2021-05-27 NOTE — ASSESSMENT & PLAN NOTE
PHQ-9 score of 12 today.  Patient reports mood is stable, no SI.  Currently taking fluoxetine, which is prescribed by psychiatrist.  -Follow-up with psychiatry as scheduled

## 2021-05-27 NOTE — ASSESSMENT & PLAN NOTE
Patient is now down to 1 cigarette every day or every other day.  This is significant improvement from prior rate of 2 to 3 cigarettes daily.  Patient had achieved his goal on tobacco reduction.  -Congratulated and encourage patient to continue  -Continue nicotine gum as needed

## 2021-05-27 NOTE — ASSESSMENT & PLAN NOTE
Patient reports that his asthma is somewhat improved than at baseline.  He has had multiple recent exacerbations due to exercise, and increased pollen outside.  Going outside helps with his depression, but causes him to have wheezing.  Uses rescue inhaler with good relief.  Uses all medications as prescribed.  Has decreased tobacco use significantly.  ACT score is 15.  No exacerbation today, lungs clear on exam.  - PFT ordered at last visit, scheduled for next month--patient has never had a PFT  - Use albuterol inhaler before going outside --prophylactic  - Discontinue ipratropium-albuterol (Combivent)  - Start tiotropium (SPIRIVA RESPIMAT) 2.5 MCG/ACT inhaler --discussed with clinic pharmacist and this will optimize asthma treatment  - Continue fluticasone salmeterol (Advair) 230-21 2 puffs twice daily; montelukast 10 mg daily, cetirizine 10 mg daily, & albuterol as needed for wheezing or shortness of breath  --Given first dose of COVID-19 vaccine in clinic today

## 2021-05-30 NOTE — NURSING NOTE
Due to patient being non-English speaking/uses sign language, an  was used for this visit. Only for face-to-face interpretation by an external agency, date and length of interpretation can be found on the scanned worksheet.       name: Steve Pereyra (Htoo)  Language: Norma  Agency:  [employer]  Phone number: [phone]  Type of interpretation:  Telephone, spoken     [place]

## 2021-05-31 RX ORDER — MONTELUKAST SODIUM 10 MG/1
TABLET ORAL
Qty: 60 TABLET | Refills: 3 | Status: SHIPPED | OUTPATIENT
Start: 2021-05-31 | End: 2022-06-03

## 2021-06-10 NOTE — PROGRESS NOTES
Pt did not return after initial evaluation. Discharge this episode of care.  Randi Tuttle PT, DPT, OCS, CLT    August 19, 2020    Patient: Mira Segundo   MR Number: [id number]   YOB: 1995   Date of Visit: 8/19/2020       Dear Dr. Felisa Enriquez:    Thank you for this referral.   We are seeing Mira Segundo for Physical Therapy of back pain.    Medicare and/or Medicaid requires physician review and approval of the treatment plan. Please review the plan of care and verify that you agree with the therapy plan of care by co-signing this note.      Plan of Care  Authorization / Certification Start Date: 08/19/20  Authorization / Certification End Date: 11/17/20  Authorization / Certification Number of Visits: 8  Communication with: Referral Source  Patient Related Instruction: Nature of Condition;Treatment plan and rationale;Self Care instruction;Basis of treatment;Body mechanics;Posture;Precautions;Next steps;Expected outcome  Times per Week: 1x every 1-2 weeks  Number of Weeks: 12  Number of Visits: 8  Discharge Planning: when indicated  Precautions / Restrictions : none  Therapeutic Exercise: ROM;Stretching;Strengthening  Neuromuscular Reeducation: posture;TNE;core;other  Neuromuscular Re-education: neurodynamics  Manual Therapy: soft tissue mobilization;joint mobilization;muscle energy  Functional Training (ADL's): self care;ADL's    Goals  Pt. will demonstrate/verbalize independence in self-management of condition in : 12 weeks  Pt. will have improved quality of sleep: with less pain;waking less times/night;in 12 weeks;Comment  Comment:: waking up less than 2x/night for improved quality of sleep  Pt. will be able to walk : 60 minutes;around the house;with less pain;with less difficulty;for household mobility;for community mobility;for work;for exercise/recreation;in 12 weeks;Comment  Comment:: with pain <5/10  Patient will stand : 60 minutes;with less pain;with less difficultty;for home chores;for work;in 12  weeks;Comment  Comment:: with pain <5/10  Patient will sit: 30 minutes;for eating;with less pain;with less difficultty;in 12 weeks;Comment  Comment: with pain <5/10  Patient will transfer: sit/stand;supine/sit;for car;for toileting;for in/out of bed;for in/out of chair;with less pain;with less difficulty;in 12 weeks;Comment  Comment: with pain <510      If you have any questions or concerns, please don't hesitate to call.    Sincerely,    Randi Tuttle, PT, DPT, OCS, CLT      Physician recommendation:     ___ Follow therapist's recommendation        ___ Modify therapy      I certify the need for these services furnished within this plan and while under my care.    Referring Provider's  Printed Name:   _____________________________________________    Referring Provider's signature:  __________________________________________________  Date/time:    ________________        After signing this form, please return to the fax number in the header.  Thank you.    [facility]   Initial Evaluation    Patient Name: Mira Segundo  Date of evaluation: 8/19/2020  Visit number: 1/8  Referring Provider: Felisa Sutton MD  Referring Diagnosis: Chronic bilateral low back pain without sciatica [M54.5, G89.29]  - Primary   Visit Diagnosis:     ICD-10-CM    1. Chronic midline low back pain without sciatica  M54.5     G89.29    2. Generalized muscle weakness  M62.81    3. Decreased range of motion of trunk and back  M25.60        Assessment:        Mira Segundo is a 25 y.o. male who presents to therapy today with chief complaints of chronic low back pain. Onset date of sx was insidious around 2016 - pain initially was significant in low back and B LEs for a couple of years but then the leg pain started to go away and now just back pain is left.  Pt reported h/o using a medication for depression and sleeping that he stopped using about 4 years ago and then started having the back pain after that.  Pain symptoms are moderate to  significant in low back.  Functional impairments include difficulty and pain with extended sitting, laying, standing and walking.  Pt demo's signs and sx consistent with lumbar jt hypomobility and trunk/hip weakness.     Pt. is appropriate for skilled PT intervention as outlined in the Plan of Care (POC).  Pt. is a good candidate for skilled PT services to improve pain levels and function.    Goals:  Pt. will demonstrate/verbalize independence in self-management of condition in : 12 weeks  Pt. will have improved quality of sleep: with less pain;waking less times/night;in 12 weeks;Comment  Comment:: waking up less than 2x/night for improved quality of sleep  Pt. will be able to walk : 60 minutes;around the house;with less pain;with less difficulty;for household mobility;for community mobility;for work;for exercise/recreation;in 12 weeks;Comment  Comment:: with pain <5/10  Patient will stand : 60 minutes;with less pain;with less difficultty;for home chores;for work;in 12 weeks;Comment  Comment:: with pain <5/10  Patient will sit: 30 minutes;for eating;with less pain;with less difficultty;in 12 weeks;Comment  Comment: with pain <5/10  Patient will transfer: sit/stand;supine/sit;for car;for toileting;for in/out of bed;for in/out of chair;with less pain;with less difficulty;in 12 weeks;Comment  Comment: with pain <510    Patient's expectations/goals are realistic.    Barriers to Learning or Achieving Goals:  Chronicity of the problem.       Plan / Patient Instructions:      Plan for next visit:  Assess response to HEP and progress strengthening and trunk ext/SB ROM as able.    Plan of Care:   Authorization / Certification Start Date: 08/19/20  Authorization / Certification End Date: 11/17/20  Authorization / Certification Number of Visits: 8  Communication with: Referral Source  Patient Related Instruction: Nature of Condition;Treatment plan and rationale;Self Care instruction;Basis of treatment;Body  mechanics;Posture;Precautions;Next steps;Expected outcome  Times per Week: 1x every 1-2 weeks  Number of Weeks: 12  Number of Visits: 8  Discharge Planning: when indicated  Precautions / Restrictions : none  Therapeutic Exercise: ROM;Stretching;Strengthening  Neuromuscular Reeducation: posture;TNE;core;other  Neuromuscular Re-education: neurodynamics  Manual Therapy: soft tissue mobilization;joint mobilization;muscle energy  Functional Training (ADL's): self care;ADL's    Treatment techniques, plan of care, and goals were discussed with the patient.  The patient agrees to the plan as outlined.  The plan of care is dynamic and will be modified on an ongoing basis.       Subjective:       Social information:   Occupation: not working   Work Status:NA    History of Present Illness:  Pt reports he has had LBP since around  and he has noticed that he gets increased pain after 15 minutes of sitting, after standing and when he sleeps he has to move a lot of comfort - he prefers his side.  Pt reports he used to take a medication for sleeping and depression and then when he stopped taking that - that is when he noticed the back pain starting.  Pt has been giving medication of ibuprofen and tylenol for the pain but states that it doesn't help.  Pt reports he used to have some tingling into his legs but not anymore.  Pt reports he lives in a house with his uncle's family and he occasionally cooks and walks around the living room.   Pt would like to work but it would be hard to do if the job is standing.    Pain Ratin  Pain rating at best: 5  Pain rating at worst: 9  Pain description: stabbing low in back    Patient reports benefit from:  change position       Objective:      Patient Outcome Measures :    Modified Oswestry Low Back Pain Disablity Questionnaire  in %: 62     Scores range from 0-100%, where a score of 0% represents minimal pain and maximal function. The minimal clinically important difference is a score  reduction of 12%.    Precautions/Restrictions: None  Involved side: Bilateral  Posture Observation:      General sitting posture is  fair.  Gait: Slow gait speed with B trendelenberg mild    Palpation: P/A pressures lumbar spine hypomobile without pain upper lumbar, hypo with pain lower lumbar ans L5/S1    ROM: Trunk flex min tight with mild low back pain, ext mod limited with increased low back pain, B SB min limited with fingertips to superior calf with increased back pain - similar to extension    Strength: B LE grossly 5/5 except L hip abd and B hip ext 4+/5 with low back/upper buttock central pain    Sensation: Intact per pt report B LEs    Special tests: Repeated trunk ROM without peripherilization    Treatment Today      TREATMENT MINUTES COMMENTS   Evaluation 18 Lumbar   Self-care/ Home management     Manual therapy     Neuromuscular Re-education     Therapeutic Activity     Therapeutic Exercises 24 Educated pt on importance of HEP for improving sx. Performed and initiated HEP per pt instructions and flow sheet with verbal, tactile and visual cues for proper technique and understanding. Printed AVS for home program.   Gait training     Modality__________________                Total 42    Blank areas are intentional and mean the treatment did not include these items.        PT Evaluation Code: (Please list factors)  Patient History/Comorbidities: none  Examination: lumbar  Clinical Presentation: stable  Clinical Decision Making: low    Patient History/  Comorbidities Examination  (body structures and functions, activity limitations, and/or participation restrictions) Clinical Presentation Clinical Decision Making (Complexity)   No documented Comorbidities or personal factors 1-2 Elements Stable and/or uncomplicated Low   1-2 documented comorbidities or personal factor 3 Elements Evolving clinical presentation with changing characteristics Moderate   3-4 documented comorbidities or personal factors 4 or more  Unstable and unpredictable High       Randi  Teece PT, DPT, OCS, CLT  8/19/2020  2:44 PM

## 2021-06-17 ENCOUNTER — RECORDS - HEALTHEAST (OUTPATIENT)
Dept: ADMINISTRATIVE | Facility: OTHER | Age: 26
End: 2021-06-17

## 2021-06-17 ENCOUNTER — RECORDS - HEALTHEAST (OUTPATIENT)
Dept: PULMONOLOGY | Facility: OTHER | Age: 26
End: 2021-06-17

## 2021-06-17 DIAGNOSIS — J45.909 UNSPECIFIED ASTHMA, UNCOMPLICATED: ICD-10-CM

## 2021-06-17 LAB — HGB BLD-MCNC: 12.9 G/DL

## 2021-06-18 NOTE — PATIENT INSTRUCTIONS - HE
"Patient Instructions by Randi Tuttle PT at 8/19/2020  2:30 PM     Author: Randi Tuttle PT Service: -- Author Type: Physical Therapist    Filed: 8/19/2020  3:32 PM Encounter Date: 8/19/2020 Status: Signed    : Randi Tuttle PT (Physical Therapist)        LOWER TRUNK ROTATIONS - LTR    Lying on your back with your knees bent, gently move your knees side-to-side.    x5-10 reps 2-3x/day  GENTLE, no pain      BRIDGING    While lying on your back, tighten your lower abdominals, squeeze your buttocks and then raise your buttocks off the floor/bed as creating a \"Bridge\" with your body.  x10 reps  2-3x/day          SUPINE HIP ABDUCTION    Lie down on your back with your knees bent. Draw your knees out and back in.  NO BAND YET.  x10 reps   2-3x/day              "

## 2021-06-28 ENCOUNTER — IMMUNIZATION (OUTPATIENT)
Dept: FAMILY MEDICINE | Facility: CLINIC | Age: 26
End: 2021-06-28
Attending: FAMILY MEDICINE
Payer: COMMERCIAL

## 2021-06-28 ENCOUNTER — OFFICE VISIT (OUTPATIENT)
Dept: FAMILY MEDICINE | Facility: CLINIC | Age: 26
End: 2021-06-28
Payer: COMMERCIAL

## 2021-06-28 VITALS
OXYGEN SATURATION: 100 % | RESPIRATION RATE: 16 BRPM | SYSTOLIC BLOOD PRESSURE: 108 MMHG | HEART RATE: 71 BPM | BODY MASS INDEX: 23.74 KG/M2 | WEIGHT: 134 LBS | TEMPERATURE: 97.8 F | DIASTOLIC BLOOD PRESSURE: 74 MMHG

## 2021-06-28 DIAGNOSIS — J30.1 SEASONAL ALLERGIC RHINITIS DUE TO POLLEN: ICD-10-CM

## 2021-06-28 DIAGNOSIS — Z72.0 TOBACCO ABUSE: ICD-10-CM

## 2021-06-28 DIAGNOSIS — J38.5 LARYNGOSPASM: Primary | ICD-10-CM

## 2021-06-28 PROCEDURE — 91301 PR COVID VAC MODERNA 100 MCG/0.5 ML IM: CPT

## 2021-06-28 PROCEDURE — 0012A PR COVID VAC MODERNA 100 MCG/0.5 ML IM: CPT

## 2021-06-28 PROCEDURE — 99214 OFFICE O/P EST MOD 30 MIN: CPT | Mod: GC | Performed by: STUDENT IN AN ORGANIZED HEALTH CARE EDUCATION/TRAINING PROGRAM

## 2021-06-28 RX ORDER — FLUTICASONE PROPIONATE 50 MCG
2 SPRAY, SUSPENSION (ML) NASAL DAILY
Qty: 9.9 ML | Refills: 3 | Status: SHIPPED | OUTPATIENT
Start: 2021-06-28 | End: 2022-06-03

## 2021-06-28 RX ORDER — CETIRIZINE HYDROCHLORIDE 10 MG/1
10 TABLET ORAL DAILY
Qty: 60 TABLET | Refills: 3 | Status: SHIPPED | OUTPATIENT
Start: 2021-06-28 | End: 2022-06-03

## 2021-06-28 ASSESSMENT — PATIENT HEALTH QUESTIONNAIRE - PHQ9
SUM OF ALL RESPONSES TO PHQ QUESTIONS 1-9: 11
5. POOR APPETITE OR OVEREATING: SEVERAL DAYS

## 2021-06-28 ASSESSMENT — ANXIETY QUESTIONNAIRES
3. WORRYING TOO MUCH ABOUT DIFFERENT THINGS: MORE THAN HALF THE DAYS
GAD7 TOTAL SCORE: 13
7. FEELING AFRAID AS IF SOMETHING AWFUL MIGHT HAPPEN: MORE THAN HALF THE DAYS
5. BEING SO RESTLESS THAT IT IS HARD TO SIT STILL: SEVERAL DAYS
6. BECOMING EASILY ANNOYED OR IRRITABLE: NEARLY EVERY DAY
1. FEELING NERVOUS, ANXIOUS, OR ON EDGE: NEARLY EVERY DAY
2. NOT BEING ABLE TO STOP OR CONTROL WORRYING: SEVERAL DAYS

## 2021-06-28 ASSESSMENT — ENCOUNTER SYMPTOMS
WHEEZING: 0
PALPITATIONS: 0
CHILLS: 0
CHEST TIGHTNESS: 0
FATIGUE: 0
SHORTNESS OF BREATH: 0

## 2021-06-28 NOTE — PROGRESS NOTES
"  Assessment & Plan     Laryngospasm  Otherwise healthy 26-year-old male patient with previous longstanding history of mild intermittent asthma with multiple exacerbations.  Therapy was escalated to max dose of medications.  However, during office visits patient with clear lungs on exam and no trace of expiratory wheezing at all.  Referred for pulmonary function test which revealed no signs of asthma and no inducible improvement with bronchodilator (see result below).  -Reviewed PFT results with patient today  -De-escalate previous daily asthma therapy--discussed with clinic pharmacist  -Discontinue Spiriva (tiotropium) inhaler today  -At next visit we will decrease Advair to 2 puffs once daily  -At subsequent visit we will discontinue Advair inhaler altogether  -Will likely continue montelukast due to severe seasonal allergies  -Continue albuterol prior to exercise and as needed for \" wheezing\" /during to spasm episodes  -Continue to address triggers which include seasonal allergies, tobacco use    Seasonal allergic rhinitis due to pollen  -     fluticasone (FLONASE) 50 MCG/ACT nasal spray; Spray 2 sprays into both nostrils daily  -     cetirizine (ZYRTEC) 10 MG tablet; Take 1 tablet (10 mg) by mouth daily    Tobacco abuse  -     nicotine (NICORETTE) 2 MG gum; Place 1 each (2 mg) inside cheek as needed for smoking cessation      Review of external notes as documented elsewhere in note  }  See Patient Instructions    Return in about 2 weeks (around 7/12/2021) for asthma follow-up.    Patient discussed with attending physician Dr. Ren Ralph who agrees with plan as above.    Felisa Sutton MD  [facility] is a 26 year old who presents for the following health issues accompanied by in person :  Chief Complaint   Patient presents with     Follow Up     Refill Request     nicotine (NICORETTE) 2 MG gum     Patient went for pulmonary function test a couple of " "weeks ago.  Reports that test was difficult for him but he was able to complete it.  PFT results demonstrate patient has normal lung function, no asthma, no improvement with bronchodilator administration.    On further questioning about asthma exacerbation symptoms, patient reports that his episodes of \"wheezing\" usually come on very suddenly.  They last about 5 to 6 minutes if he does not use an inhaler and are very uncomfortable.  If he uses an inhaler, symptoms last 2 to 3 minutes.  Reports wheezing, difficulty getting air in.  As previously discussed, triggers are cold air and exercise.  Sometimes, symptoms are worse when he has seasonal allergies or smokes cigarettes.       Review of Systems   Constitutional: Negative for chills and fatigue.   Respiratory: Negative for chest tightness, shortness of breath and wheezing.    Cardiovascular: Negative for chest pain and palpitations.          Objective    /74 (BP Location: Left arm, Patient Position: Sitting, Cuff Size: Adult Regular)   Pulse 71   Temp 97.8  F (36.6  C) (Oral)   Resp 16   Wt 60.8 kg (134 lb)   SpO2 100%   BMI 23.74 kg/m    Body mass index is 23.74 kg/m .  Physical Exam  Vitals signs reviewed.   Constitutional:       Appearance: He is normal weight.   Cardiovascular:      Rate and Rhythm: Normal rate and regular rhythm.      Pulses: Normal pulses.      Heart sounds: Normal heart sounds.   Pulmonary:      Effort: Pulmonary effort is normal. No respiratory distress.      Breath sounds: Normal breath sounds. No stridor.   Abdominal:      Palpations: Abdomen is soft.   Neurological:      Mental Status: He is alert.   Psychiatric:         Mood and Affect: Mood normal.         Behavior: Behavior normal.        6/17/2021 pulmonary function test Result    \"FEV1/FVC is 0.85 and is normal.  FEV1 is 3.56 L (116% predicted) and is normal.  FVC is 4.20 L (119% predicted) and increased.  There was no improvement in spirometry after a single inhaled " "dose of   bronchodilator.  TLC is 5.25 L (90% predicted) and is normal.  RV/TLC is 23% and is normal.  DLCO is 139% predicted and is increased when it is corrected for   hemoglobin.  Flow volume loop is normal.    Impression:  Full Pulmonary Function Test is normal  Spirometry is normal. Spirometry is not consistent with reversibility.   There is no hyperinflation. There is no air-trapping. Diffusion capacity   when corrected for hemoglobin is increased.\"    Reviewed by pulmonologist Dr. Kelsey Kwok           "

## 2021-06-28 NOTE — NURSING NOTE
Due to patient being non-English speaking/uses sign language, an  was used for this visit. Only for face-to-face interpretation by an external agency, date and length of interpretation can be found on the scanned worksheet.       name: Steve Pereyra (Htoo)  Language: Norma  Agency:  [employer]  Phone number: [phone]  Type of interpretation:  Face-to-face, spoken

## 2021-06-29 ASSESSMENT — ANXIETY QUESTIONNAIRES: GAD7 TOTAL SCORE: 13

## 2021-07-13 ENCOUNTER — OFFICE VISIT (OUTPATIENT)
Dept: FAMILY MEDICINE | Facility: CLINIC | Age: 26
End: 2021-07-13
Payer: COMMERCIAL

## 2021-07-13 VITALS
BODY MASS INDEX: 22.99 KG/M2 | DIASTOLIC BLOOD PRESSURE: 75 MMHG | WEIGHT: 129.8 LBS | SYSTOLIC BLOOD PRESSURE: 102 MMHG | HEART RATE: 75 BPM | RESPIRATION RATE: 20 BRPM | OXYGEN SATURATION: 100 % | TEMPERATURE: 98.3 F

## 2021-07-13 DIAGNOSIS — J30.1 SEASONAL ALLERGIC RHINITIS DUE TO POLLEN: ICD-10-CM

## 2021-07-13 DIAGNOSIS — J38.5 LARYNGOSPASM: Primary | ICD-10-CM

## 2021-07-13 DIAGNOSIS — F17.200 TOBACCO USE DISORDER: ICD-10-CM

## 2021-07-13 PROCEDURE — 99214 OFFICE O/P EST MOD 30 MIN: CPT | Mod: GC | Performed by: STUDENT IN AN ORGANIZED HEALTH CARE EDUCATION/TRAINING PROGRAM

## 2021-07-13 RX ORDER — FLUTICASONE PROPIONATE AND SALMETEROL XINAFOATE 230; 21 UG/1; UG/1
2 AEROSOL, METERED RESPIRATORY (INHALATION) DAILY
Qty: 12 G | Refills: 4 | COMMUNITY
Start: 2021-07-13 | End: 2021-11-10 | Stop reason: ALTCHOICE

## 2021-07-13 ASSESSMENT — ANXIETY QUESTIONNAIRES
7. FEELING AFRAID AS IF SOMETHING AWFUL MIGHT HAPPEN: NEARLY EVERY DAY
6. BECOMING EASILY ANNOYED OR IRRITABLE: NEARLY EVERY DAY
2. NOT BEING ABLE TO STOP OR CONTROL WORRYING: SEVERAL DAYS
5. BEING SO RESTLESS THAT IT IS HARD TO SIT STILL: MORE THAN HALF THE DAYS
GAD7 TOTAL SCORE: 14
1. FEELING NERVOUS, ANXIOUS, OR ON EDGE: SEVERAL DAYS
3. WORRYING TOO MUCH ABOUT DIFFERENT THINGS: MORE THAN HALF THE DAYS

## 2021-07-13 NOTE — PATIENT INSTRUCTIONS
Decrease Advair to 2 puffs 1  time daily for one week.    Then stop it completely.    Keep a log of how often you use the albuterol

## 2021-07-13 NOTE — PROGRESS NOTES
"  Assessment & Plan     Mira Segundo is a Norma-speaking 27 yo male with prior diagnosis of asthma later found to have normal PFTs.  Patient is now on a taper of his previously prescribed daily inhalers.  He reports that his symptoms have not changed since we began the taper.  Previously, symptoms did not improve with increasing medications to next doses.  On further clarification, patient reports \"wheezing\" episodes which last a few seconds and he believes are resolved with albuterol inhaler.  Given non-English-speaking status, and poor health literacy, it is difficult to get clear history from the patient and likely never had true wheezing episode.  Over the past year, patient has never had wheezing on exam here at the clinic.  However, patient symptoms are worsened with seasonal allergies and cigarette smoke.  These triggers do not preclude laryngospasm or vocal cord disorder.    Laryngospasm / vocal cord disorder    Spiriva discontinued last visit    Decrease Advair to 2 puffs daily (was twice daily) for 1 week, then discontinue altogether    Follow-up in 2 weeks    Continue albuterol as needed-patient will keep a log of use over the next 2 weeks    Continue montelukast    At next visit will practice breathing exercises with patient and consider referral to speech therapy for breathing exercises during episodes     Seasonal allergic rhinitis due to pollen    As above    Tobacco use disorder  Patient continues to smoke 2 cigarettes daily.  He is attempting to quit.  However he is still at 2 cigarettes daily.    Continue nicotine gum      Return in about 2 weeks (around 7/27/2021).    Patient dicussed with attending physician, Dr.Mark Kearney, who agrees with the plan.   -----  Felisa Sutton MD  PGY-3  Family Medicine Resident      From last visit  Reviewed PFT results with patient today  -De-escalate previous daily asthma therapy--discussed with clinic pharmacist  -Discontinue Spiriva (tiotropium) inhaler today  -At next " "visit we will decrease Advair to 2 puffs once daily  -At subsequent visit we will discontinue Advair inhaler altogether  -Will likely continue montelukast due to severe seasonal allergies  -Continue albuterol prior to exercise and as needed for \" wheezing\" /during to spasm episodes  -Continue to address triggers which include seasonal allergies, tobacco use       Subjective   Mira is a 26 year old who presents for the following health issues  accompanied by his  - Steve Pereyra in personson:  Chief Complaint   Patient presents with     RECHECK     come here today to follow up on Asthma per patient.      Medication Reconciliation     reviewed.       Patient reports that he is feeling well.  Still having episodes of \"wheezing\" intermittently, for which she reports albuterol is helpful.  Patient understands that he no longer carries diagnosis of asthma, but unclear how well he understands this.  Tolerating taper of daily inhalers quite well.  Continues to smoke 3 cigarettes daily.      Review of Systems   Constitutional: Negative for activity change, fatigue and fever.   Respiratory: Negative for chest tightness, shortness of breath and wheezing.    Cardiovascular: Negative for chest pain.          Objective    /75 (BP Location: Left arm, Patient Position: Sitting, Cuff Size: Adult Regular)   Pulse 75   Temp 98.3  F (36.8  C) (Oral)   Resp 20   Wt 58.9 kg (129 lb 12.8 oz)   SpO2 100%   BMI 22.99 kg/m    Body mass index is 22.99 kg/m .  Physical Exam  Constitutional:       Appearance: Normal appearance. He is normal weight.   Cardiovascular:      Rate and Rhythm: Normal rate and regular rhythm.      Pulses: Normal pulses.      Heart sounds: Normal heart sounds.   Pulmonary:      Effort: Pulmonary effort is normal. No respiratory distress.      Breath sounds: Normal breath sounds. No wheezing or rales.   Neurological:      General: No focal deficit present.      Mental Status: He is alert.   Psychiatric: "         Mood and Affect: Mood normal.         Behavior: Behavior normal.

## 2021-07-13 NOTE — PROGRESS NOTES
Preceptor Attestation:    I discussed the patient with the resident and evaluated the patient in person. I have verified the content of the note, which accurately reflects my assessment of the patient and the plan of care.   Supervising Physician:  Lamont Kearney MD.

## 2021-07-13 NOTE — NURSING NOTE
Due to patient being non-English speaking/uses sign language, an  was used for this visit. Only for face-to-face interpretation by an external agency, date and length of interpretation can be found on the scanned worksheet.       name: Steve Pereyra (Htoo)  Language: Norma  Agency:  [employer]  Phone number: [phone]  Type of interpretation:  Face-to-face, spoken

## 2021-07-14 ASSESSMENT — ANXIETY QUESTIONNAIRES: GAD7 TOTAL SCORE: 14

## 2021-07-15 ASSESSMENT — ENCOUNTER SYMPTOMS
ACTIVITY CHANGE: 0
SHORTNESS OF BREATH: 0
FEVER: 0
WHEEZING: 0
CHEST TIGHTNESS: 0
FATIGUE: 0

## 2021-07-16 NOTE — ASSESSMENT & PLAN NOTE
Spiriva discontinued last visit    Decrease Advair to 2 puffs daily (was twice daily) for 1 week, then discontinue altogether    Follow-up in 2 weeks    Continue albuterol as needed-patient will keep a log of use over the next 2 weeks    Continue montelukast    At next visit will practice breathing exercises with patient and consider referral to speech therapy for breathing exercises during episodes

## 2021-08-11 NOTE — NURSING NOTE
Chief Complaint   Patient presents with     RECHECK     INSOMNIA AND ASTHMA F/U. Patient also has lower back pain      Kofi Reich CMA    Due to patient being non-English speaking/uses sign language, an  was used for this visit. Only for face-to-face interpretation by an external agency, date and length of interpretation can be found on the scanned worksheet.     name: NIKHIL BRITTON  Agency: [employer]  Language: Norma   Telephone number: [phone]  Type of interpretation: Face-to-face, spoken     Kofi Reich CMA           Yes

## 2021-10-05 ENCOUNTER — TELEPHONE (OUTPATIENT)
Dept: FAMILY MEDICINE | Facility: CLINIC | Age: 26
End: 2021-10-05

## 2021-10-05 NOTE — TELEPHONE ENCOUNTER
Patient Quality Outreach      Summary:    Patient has the following on his problem list/HM:   Asthma review       ACT Total Scores 5/24/2021   ACT TOTAL SCORE -   ASTHMA ER VISITS -   ASTHMA HOSPITALIZATIONS -   ACT TOTAL SCORE (Goal Greater than or Equal to 20) 15   In the past 12 months, how many times did you visit the emergency room for your asthma without being admitted to the hospital? 0   In the past 12 months, how many times were you hospitalized overnight because of your asthma? 0          Patient is due/failing the following:   Asthma follow-up visit and Annual wellness, date due: schedule asthma 10/22/2021 @4:30pm    Type of outreach:    Phone, spoke to patient/parent. 10/05/2021    Questions for provider review:    None                                                                                                                                     Edis Lira MA       Chart routed to

## 2021-10-22 DIAGNOSIS — J38.5 LARYNGOSPASM: ICD-10-CM

## 2021-11-10 ENCOUNTER — OFFICE VISIT (OUTPATIENT)
Dept: FAMILY MEDICINE | Facility: CLINIC | Age: 26
End: 2021-11-10
Payer: COMMERCIAL

## 2021-11-10 VITALS
DIASTOLIC BLOOD PRESSURE: 81 MMHG | HEART RATE: 100 BPM | HEIGHT: 63 IN | SYSTOLIC BLOOD PRESSURE: 119 MMHG | RESPIRATION RATE: 20 BRPM | BODY MASS INDEX: 24.34 KG/M2 | WEIGHT: 137.4 LBS | OXYGEN SATURATION: 97 % | TEMPERATURE: 98 F

## 2021-11-10 DIAGNOSIS — J45.20 MILD INTERMITTENT ASTHMA WITHOUT COMPLICATION: Primary | ICD-10-CM

## 2021-11-10 DIAGNOSIS — J38.5 LARYNGOSPASM: ICD-10-CM

## 2021-11-10 PROBLEM — J45.909 ASTHMA: Status: ACTIVE | Noted: 2021-11-10

## 2021-11-10 PROCEDURE — 99214 OFFICE O/P EST MOD 30 MIN: CPT | Mod: GC | Performed by: STUDENT IN AN ORGANIZED HEALTH CARE EDUCATION/TRAINING PROGRAM

## 2021-11-10 PROCEDURE — T1013 SIGN LANG/ORAL INTERPRETER: HCPCS | Performed by: STUDENT IN AN ORGANIZED HEALTH CARE EDUCATION/TRAINING PROGRAM

## 2021-11-10 RX ORDER — FLUTICASONE PROPIONATE AND SALMETEROL XINAFOATE 115; 21 UG/1; UG/1
2 AEROSOL, METERED RESPIRATORY (INHALATION) 2 TIMES DAILY
Qty: 12 G | Refills: 1 | Status: SHIPPED | OUTPATIENT
Start: 2021-11-10 | End: 2022-02-01

## 2021-11-10 ASSESSMENT — MIFFLIN-ST. JEOR: SCORE: 1503.13

## 2021-11-10 NOTE — NURSING NOTE
Due to patient being non-English speaking/uses sign language, an  was used for this visit. Only for face-to-face interpretation by an external agency, date and length of interpretation can be found on the scanned worksheet.       name: Steve Pereyra (Htoo)  Language: Norma  Agency:  [employer]  Phone number: [phone]  Type of interpretation:  Face-to-face, spoken

## 2021-11-10 NOTE — PROGRESS NOTES
"[facility] - Office Visit    ASSESSMENT & PLAN     Mild intermittent asthma without complication  Laryngospasm  Patient presents for follow-up of \"wheezing\" symptoms which he has had for a long time-episodes last about 1 minute.  Patient has not been observed wheezing in office or by any medical provider.  Previously completed PFTs a few months ago which were normal so I proceeded to decrease with plan to taper off asthma medications.  Duration of wheezing episodes approximately a minute or less and seem more consistent with laryngospasm.  However, since decreasing Advair, patient reports he has been having more wheezing episodes and is using the Advair for these episodes -with relief.  We will restart controller inhaler today and send patient to pulmonology for further evaluation.  -     Restart fluticasone-salmeterol (ADVAIR-HFA) 115-21 MCG/ACT inhaler; Inhale 2 puffs into the lungs 2 times daily  -  Continue cetirizine, fluticasone, and montelukast  -     Adult Pulmonary Medicine Referral --for assistance with diagnosis, PFT was normal and patient has never been observed wheezing    Follow-up: Return in about 4 weeks (around 12/8/2021).    The patient was seen by, and discussed with, Dr. Sanchez Oquendo who agrees with the plan.  -----  Felisa Sutton MD  PGY-3  [facility]       YRIS Segundo is a 26 year old  male who presents to clinic today for the following  accompanied by his  Steve Pereyra in person:    Asthma - Previosly had PFT which was normal.   Difficulty breathing feels like if someone is choking him.  Albuterol helps for some time, but feels this was better when was taking the daily inhaler.  Says the sensation recurs in a couple hours of using daily inhaler. Triggered by light activity.  Episosodes last 1 week.  Tightness mostly in throat and into sternum.  Endorses coughing at night and daytime as well.  Hears wheezing when this happens.     Review of Systems " "  Constitutional: Negative for chills and fever.   HENT: Negative for congestion, rhinorrhea and sore throat.    Respiratory: Positive for cough, shortness of breath and wheezing. Negative for apnea, choking and chest tightness.    Gastrointestinal: Negative.    Musculoskeletal: Negative.           OBJECTIVE   /81 (BP Location: Left arm, Patient Position: Sitting, Cuff Size: Adult Regular)   Pulse 100   Temp 98  F (36.7  C) (Oral)   Resp 20   Ht 1.608 m (5' 3.3\")   Wt 62.3 kg (137 lb 6.4 oz)   SpO2 97%   BMI 24.11 kg/m       Physical Exam  Constitutional:       Appearance: Normal appearance.   HENT:      Head: Normocephalic.      Nose: Nose normal. No congestion or rhinorrhea.      Mouth/Throat:      Mouth: Mucous membranes are moist.      Pharynx: No oropharyngeal exudate.   Eyes:      Conjunctiva/sclera: Conjunctivae normal.      Pupils: Pupils are equal, round, and reactive to light.   Cardiovascular:      Rate and Rhythm: Normal rate and regular rhythm.      Pulses: Normal pulses.      Heart sounds: Normal heart sounds. No murmur heard.      Pulmonary:      [place]: Pulmonary effort is normal. No respiratory distress.      Breath sounds: Normal breath sounds. No stridor. No wheezing or rales.   Chest:      Chest wall: No tenderness.   Abdominal:      General: Abdomen is flat. Bowel sounds are normal.      Palpations: Abdomen is soft.   Neurological:      General: No focal deficit present.      Mental Status: He is alert.   Psychiatric:         Mood and Affect: Mood normal.         "

## 2021-11-10 NOTE — PROGRESS NOTES
Medication Therapy Management (MTM) Encounter    ASSESSMENT:                            Medication Adherence/Access: No issues identified    Type 2 Diabetes: Patient is not meeting A1c goal of < 7%. Though A1c has improved. 91% in target range. Would prefer tight blood sugar control prior to surgery. Ct lifestyle modifications. Suggest she take mealtime insulin when FBG is > 150 prior to a meal. From our discussion, her blood sugar would spike up to 200's when the blood sugar prior to meal was over 150.  Suggest she monitor the recurrent yeast infections closely.     PLAN:                            1. Start Humalog 4 units prior to meal if blood sugar is over 150 mg/dL before eating.     2. If recurrent yeast infection let  know - we may need to stop the Jardiance.     3. A1c due sometime after 5/22.     Follow-up: after a1c lab. June.    SUBJECTIVE/OBJECTIVE:                          Johnna Caballero is a 54 year old female called for a follow-up visit. She was referred to me from Dr. uDrant.  Today's visit is a follow-up MTM visit from 3/2/21.    Reason for visit: pre-opt DM follow-up.    Allergies/ADRs: Reviewed in chart  Tobacco: She reports that she has never smoked. She has never used smokeless tobacco.  Alcohol: not currently using  Past Medical History: Reviewed in chart    Medication Adherence/Access: no issues reported    Diabetes:  Pt currently taking metformin IR 1000 mg twice daily, Lantus 40 units qAM + 8 units qPM (tota 48 units), Jardiance 10 mg daily. She decided to continue Jardiance because the nausea is improving. We did discuss more concerning side effect would be recurrent yeast infections. She denies any current infections --> she wants to rule out high blood sugar as the cause of them first.   She picked up Humalog but has not used wondering if needed.   Surgery on 3/23.  Patient is not experiencing side effects.  SMBG:         Frequency of hypoglycemia? None. Symptoms of low blood sugar  Preceptor Attestation:    I discussed the patient with the resident and evaluated the patient in person. I have verified the content of the note, which accurately reflects my assessment of the patient and the plan of care.   Supervising Physician:  Sanchez Hannon MD.                    - none.    Eye exam: up to date   Foot exam: due  ACEi/ARB: Yes: losartan.   Diet: she is experimenting to see which foods are causing elevations, that is typically when she has blood sugar in the 200 from jayla reader. She is having a friend help cook low carb meals for her.     Today's Vitals: There were no vitals taken for this visit.  ----------------      I spent 15 minutes with this patient today. All changes were made via collaborative practice agreement with Linda Durant. A copy of the visit note was provided to the patient's primary care provider.    The patient was sent via [employer] a summary of these recommendations.     Flip OlivaresD  Medication Therapy Management Pharmacist  Pager#: [phone]    Telemedicine Visit Details  Type of service:  Telephone visit  Start Time: 2:08 PM  End Time: 2:23 PM  Originating Location (patient location): [place]  [facility] ([facility]

## 2021-11-11 ASSESSMENT — ASTHMA QUESTIONNAIRES: ACT_TOTALSCORE: 18

## 2021-11-11 ASSESSMENT — PATIENT HEALTH QUESTIONNAIRE - PHQ9: SUM OF ALL RESPONSES TO PHQ QUESTIONS 1-9: 13

## 2021-11-22 ASSESSMENT — ENCOUNTER SYMPTOMS
MUSCULOSKELETAL NEGATIVE: 1
SHORTNESS OF BREATH: 1
GASTROINTESTINAL NEGATIVE: 1
COUGH: 1
CHOKING: 0
SORE THROAT: 0
CHEST TIGHTNESS: 0
CHILLS: 0
APNEA: 0
WHEEZING: 1
FEVER: 0
RHINORRHEA: 0

## 2022-01-26 DIAGNOSIS — J45.20 MILD INTERMITTENT ASTHMA WITHOUT COMPLICATION: ICD-10-CM

## 2022-02-01 RX ORDER — FLUTICASONE PROPIONATE AND SALMETEROL XINAFOATE 115; 21 UG/1; UG/1
AEROSOL, METERED RESPIRATORY (INHALATION)
Qty: 12 G | Refills: 1 | Status: SHIPPED | OUTPATIENT
Start: 2022-02-01 | End: 2022-04-15

## 2022-04-14 DIAGNOSIS — J45.20 MILD INTERMITTENT ASTHMA WITHOUT COMPLICATION: ICD-10-CM

## 2022-04-15 RX ORDER — FLUTICASONE PROPIONATE AND SALMETEROL XINAFOATE 115; 21 UG/1; UG/1
1 AEROSOL, METERED RESPIRATORY (INHALATION) 2 TIMES DAILY
Qty: 12 G | Refills: 1 | Status: SHIPPED | OUTPATIENT
Start: 2022-04-15 | End: 2022-06-03

## 2022-06-01 ENCOUNTER — TELEPHONE (OUTPATIENT)
Dept: FAMILY MEDICINE | Facility: CLINIC | Age: 27
End: 2022-06-01
Payer: COMMERCIAL

## 2022-06-01 NOTE — TELEPHONE ENCOUNTER
2022: Care Coordination    [employer] providing transportation  for an office visit with: Dr Sutton at:11:20am.      between:10:20am -10:50am    Return trip: Will call by [phone]    I left a message on his phone and with his father.      Marko Oconnell Sr.  Care Coordinator  [address] [address]  [email or web address]   Office: [phone]  Direct: [phone]  [facility]

## 2022-06-03 ENCOUNTER — OFFICE VISIT (OUTPATIENT)
Dept: FAMILY MEDICINE | Facility: CLINIC | Age: 27
End: 2022-06-03
Payer: COMMERCIAL

## 2022-06-03 VITALS
HEART RATE: 86 BPM | DIASTOLIC BLOOD PRESSURE: 94 MMHG | SYSTOLIC BLOOD PRESSURE: 133 MMHG | TEMPERATURE: 98.1 F | RESPIRATION RATE: 20 BRPM | HEIGHT: 63 IN | WEIGHT: 135 LBS | OXYGEN SATURATION: 98 % | BODY MASS INDEX: 23.92 KG/M2

## 2022-06-03 DIAGNOSIS — M54.50 CHRONIC BILATERAL LOW BACK PAIN WITHOUT SCIATICA: ICD-10-CM

## 2022-06-03 DIAGNOSIS — G89.29 CHRONIC BILATERAL LOW BACK PAIN WITHOUT SCIATICA: ICD-10-CM

## 2022-06-03 DIAGNOSIS — F33.1 MAJOR DEPRESSIVE DISORDER, RECURRENT EPISODE, MODERATE (H): ICD-10-CM

## 2022-06-03 DIAGNOSIS — Z23 HIGH PRIORITY FOR 2019-NCOV VACCINE: ICD-10-CM

## 2022-06-03 DIAGNOSIS — J45.30 MILD PERSISTENT ASTHMA WITHOUT COMPLICATION: Primary | ICD-10-CM

## 2022-06-03 DIAGNOSIS — J30.1 SEASONAL ALLERGIC RHINITIS DUE TO POLLEN: ICD-10-CM

## 2022-06-03 PROCEDURE — 91306 COVID-19,PF,MODERNA (18+ YRS BOOSTER .25ML): CPT | Performed by: STUDENT IN AN ORGANIZED HEALTH CARE EDUCATION/TRAINING PROGRAM

## 2022-06-03 PROCEDURE — 99214 OFFICE O/P EST MOD 30 MIN: CPT | Mod: 25 | Performed by: STUDENT IN AN ORGANIZED HEALTH CARE EDUCATION/TRAINING PROGRAM

## 2022-06-03 PROCEDURE — 0064A COVID-19,PF,MODERNA (18+ YRS BOOSTER .25ML): CPT | Performed by: STUDENT IN AN ORGANIZED HEALTH CARE EDUCATION/TRAINING PROGRAM

## 2022-06-03 RX ORDER — ALBUTEROL SULFATE 90 UG/1
2 AEROSOL, METERED RESPIRATORY (INHALATION) EVERY 6 HOURS PRN
Qty: 18 G | Refills: 1 | Status: SHIPPED | OUTPATIENT
Start: 2022-06-03 | End: 2022-07-07

## 2022-06-03 RX ORDER — FLUTICASONE PROPIONATE AND SALMETEROL XINAFOATE 115; 21 UG/1; UG/1
2 AEROSOL, METERED RESPIRATORY (INHALATION) DAILY
Qty: 12 G | Refills: 1 | Status: SHIPPED | OUTPATIENT
Start: 2022-06-03 | End: 2022-12-19

## 2022-06-03 RX ORDER — FLUOXETINE 10 MG/1
10 CAPSULE ORAL DAILY
Qty: 90 CAPSULE | Refills: 2 | Status: SHIPPED | OUTPATIENT
Start: 2022-06-03 | End: 2023-03-01

## 2022-06-03 RX ORDER — FLUTICASONE PROPIONATE 50 MCG
2 SPRAY, SUSPENSION (ML) NASAL DAILY
Qty: 9.9 ML | Refills: 3 | Status: SHIPPED | OUTPATIENT
Start: 2022-06-03 | End: 2022-10-11

## 2022-06-03 RX ORDER — CETIRIZINE HYDROCHLORIDE 10 MG/1
10 TABLET ORAL DAILY
Qty: 90 TABLET | Refills: 3 | Status: SHIPPED | OUTPATIENT
Start: 2022-06-03 | End: 2024-05-20

## 2022-06-03 RX ORDER — HYDROXYZINE HYDROCHLORIDE 50 MG/1
100 TABLET, FILM COATED ORAL AT BEDTIME
Qty: 90 TABLET | Refills: 1 | Status: SHIPPED | OUTPATIENT
Start: 2022-06-03 | End: 2022-10-03

## 2022-06-03 ASSESSMENT — PATIENT HEALTH QUESTIONNAIRE - PHQ9: SUM OF ALL RESPONSES TO PHQ QUESTIONS 1-9: 18

## 2022-06-03 ASSESSMENT — ASTHMA QUESTIONNAIRES: ACT_TOTALSCORE: 14

## 2022-06-03 NOTE — PROGRESS NOTES
[facility] - Office Visit    ASSESSMENT & PLAN     Lo was seen today for follow-up of a number of medical problems.    Mild persistent asthma without complication  I have been managing this patient for asthma for a couple of years.  No previous PFTs, so this was done a year and a half ago.  PFT did not show any inducible changes or suggest that patient has asthma. Lungs clear to auscultation in clinic always.  I suspected laryngospasm was to blame, however patient continues to report improvement in symptoms when he uses albuterol and describes symptoms as wheezing despite multiple attempts to clarify his meaning.  Says that triggers are cold air including air conditioning as well as smoking cigarettes which he is cut down significantly.  Given ongoing symptoms and improvement with current medications, will continue meds and refer to pulmonologist for further evaluation and management.  If this was truly asthma, we would transition to SMART therapy with Symbicort, however anticipate possible change in meds after patient sees pulmonology so we will hold off on changes for now.  -     refill/continue for now --albuterol (PROAIR HFA/PROVENTIL HFA/VENTOLIN HFA) 108 (90 Base) MCG/ACT inhaler; Inhale 2 puffs into the lungs every 6 hours as needed for shortness of breath / dyspnea or wheezing  -    Refill/continue for now--- fluticasone-salmeterol (ADVAIR HFA) 115-21 MCG/ACT inhaler; Inhale 2 puffs into the lungs daily  -     Adult Pulmonary Medicine Referral--would appreciate recommendations as to whether or not this is asthma given normal PFTs    Major depressive disorder, recurrent episode, moderate (H)  Patient previously was seeing psychiatry and had been on antipsychotics in 2020.  He was subsequently transitioned to SSRI and on last check was only taking fluoxetine and as needed hydroxyzine which she finds very useful.  We will refill his medications today.  -     refill FLUoxetine (PROZAC) 10 MG capsule;  Take 1 capsule (10 mg) by mouth daily  -     Refill hydrOXYzine (ATARAX) 50 MG tablet; Take 2 tablets (100 mg) by mouth At Bedtime  -  Contact info for prior psychiatrist Dr. Isaac Chrsitianson  at [facility] in Treatment Team section of electronic health record if needed--staff RN is very helpful    Seasonal allergic rhinitis due to pollen  -     Refill cetirizine (ZYRTEC) 10 MG tablet; Take 1 tablet (10 mg) by mouth daily  -     Refill fluticasone (FLONASE) 50 MCG/ACT nasal spray; Spray 2 sprays into both nostrils daily    Chronic bilateral low back pain without sciatica  Today patient reports that he continues to struggle with lower back pain which limits his ability to sit for long periods or walk for long periods.  He is currently on legal probation and required to work.  Requesting letter stating he cannot work today.    Provided letter stating patient has limited working ability -approximately 4 hours a day for sitting, standing, or walking (see letter section)    Explained to patient that he would need to see psychiatry or be seen regularly for mental health issues if reason he cannot work is psychological    High priority for 2019-nCoV vaccine  -     COVID-19,PF,MODERNA (18+ Yrs BOOSTER .25mL)      Medications Discontinued During This Encounter   Medication Reason     montelukast (SINGULAIR) 10 MG tablet Therapy completed     nicotine (NICORETTE) 2 MG gum Therapy completed     FLUoxetine (PROZAC) 10 MG capsule Reorder     albuterol (PROAIR HFA/PROVENTIL HFA/VENTOLIN HFA) 108 (90 Base) MCG/ACT inhaler Reorder     hydrOXYzine (ATARAX) 50 MG tablet Reorder     fluticasone (FLONASE) 50 MCG/ACT nasal spray Reorder     cetirizine (ZYRTEC) 10 MG tablet Reorder     fluticasone-salmeterol (ADVAIR HFA) 115-21 MCG/ACT inhaler Reorder       Follow-up: Return in about 2 weeks (around 6/17/2022) for Follow up asthma.    Patient dicussed with attending physician, Dr.Mark Kearney, who agrees with the plan.   -----  Felisa  "MD Lesley  PGY-3  Family Medicine Resident       SUBJECTIVE       Mira Segundo is a 27 year old  male who presents to clinic today for the following:  Chief Complaint   Patient presents with     Other     Follow-up on med and need refill and restriction letter for work     Imm/Inj     COVID-19 VACCINE     Meds  Wants refills on all his meds but doesn't know names of them  No longer going to psychiatry --Last visit with them was 6 months ago  Ran out meds 3 weeks ago - not sure which ones    asthma - using the daily inhaler and prn albuterol 2-3x/week for tightness in chest, shortness of breath  Previously had normal PFT. Worse with exposure to cold even AC.    Probation  Wants a letter that says he's unable to work  Breathing problem - uses inhalers -- likely not asthma though (see above)  Back pain - can't stand for long time which limits his ability to work  Mental health issue - followed with psychiatry in the past, but hasn't gone in a long time- says goes to [employer] now  Doesn't have a car / transportation for work    Review of Systems  ROS: 10 point ROS neg other than the symptoms noted above in the HPI.        OBJECTIVE   BP (!) 133/94   Pulse 86   Temp 98.1  F (36.7  C) (Oral)   Resp 20   Ht 1.603 m (5' 3.1\")   Wt 61.2 kg (135 lb)   SpO2 98%   BMI 23.84 kg/m       Physical Exam  General:  Normal appearance, no apparent distress  Cardiovascular: Normal rate and regular rhythm, normal heart sounds  Lungs: Pulmonary effort is normal, normal breath sounds  Extremities: no lower extremity edema  Pulses:  2+ radial, dorsalis pedis  Skin: Warm and dry. No concerning rashes or lesions.  Neurologic: No focal deficit, alert and oriented x3  Psychiatric: normal mood and flat affect, cooperative   "

## 2022-06-03 NOTE — LETTER
Rachel 3, 2022      RE: Mira Segundo  [address] [place] [address]       To Whom It May Concern:    Mira Segundo was seen in our clinic today.     He  may return to work under the following conditions:     Light duty    no lifting more than 20 pounds    unable to bend, stoop or twist    No standing for more than 2 hours at a time    No work more than 6 hours in a day      Sincerely,        Felisa Sutton MD

## 2022-06-03 NOTE — COMMUNITY RESOURCES LIST (ENGLISH)
06/03/2022   [facility] - Outpatient Clinics  Edis Lira  For questions about this resource list or additional care needs, please contact your primary care clinic or care manager.  Phone: [phone]   Email: N/A   Address: [address] [place] [address]   Hours: N/A        Hotlines and Helplines       Hotline - Crisis help  1  [facility] - Crisis Text Line - Crisis Text Line Distance: 1.99 miles      COVID-19 Status: Phone/Virtual   [address] [place] [address]  Language: English  Hours: Mon - Sun Open 24 Hours   Website: https://[place].gov/dhs/partners-and-providers/policies-procedures/adult-mental-health/crisis-text-line/     2  [facility] Distance: 2 miles      COVID-19 Status: Phone/Virtual   [address] [place] [address]  Language: English, Hmong, Ukrainian  Hours: Mon - Sun Open 24 Hours   Phone: (442) 611-4873 Website: https://www.[facility]./[facility]/health-medical/clinics-services/mental-behavorial-health/adult-mental-health-chemical-health/urgent-care-adult-mental-health          Mental Health       Individual counseling  3  [facility] Distance: 0.63 miles      COVID-19 Status: Service Unavailable   [address] [place] [address]  Language: English  Hours: Mon - Fri 8:00 AM - 5:00 PM  Fees: Insurance, Self Pay   Phone: (785) 532-7143 Website: https://[employer].org/locations/[facility]---[facility]     4  [facility] Distance: 0.79 miles      COVID-19 Status: Regular Operations, COVID-19 Status: Phone/Virtual   [address] [place] [address]  Language: English, Hmong, Ukrainian  Hours: Mon 8:00 AM - 8:00 PM , Tue 8:00 AM - 5:00 PM , Wed - Thu 8:00 AM - 8:00 PM , Fri 8:00 AM - 5:00 PM , Sat 8:00 AM - 12:00 PM  Fees: Insurance, Self Pay, Sliding Fee   Phone: (705) 850-6918 Website:  https://www.[place]care.org     Mental health crisis care  5  [facility] - Adult Program Distance: 2 miles      COVID-19 Status: Regular Operations   [address] [place] [address]  Language: English, Hmong, Korean  Hours: Mon - Fri 8:00 AM - 5:30 PM  Fees: Insurance, Self Pay, Sliding Fee   Phone: (723) 224-4984 Website: https://www.[facility]./[facility]/health-medical/clinics-services/mental-behavorial-health/adult-mental-health-chemical-health/urgent-care-adult-mental-health     6  [facility] - Crisis Stabilization Services ([place]) Distance: 5.55 miles      COVID-19 Status: Regular Operations   [address] [place] [address]  Language: English, Malawian  Hours: Mon - Sun Open 24 Hours  Fees: Insurance   Phone: (160) 630-5713 Email: info@[employer] Website: https://[facility].org/services-programs/residential-services/leslie-neeta-house/     Mental health support group  7  [facility] Distance: 2.61 miles      COVID-19 Status: Phone/Virtual   [address] [facility] [address] [place] [address]  Language: English  Hours: Mon - Fri 8:00 AM - 4:30 PM  Fees: Free   Phone: (175) 532-9610 Website: https://www.[place].gov/find-locations/facility/vc_0416V     8  [facility] Distance: 5.11 miles      COVID-19 Status: Phone/Virtual   [address] [place] [address]  Language: English  Hours: Mon - Thu 7:00 AM - 8:00 PM , Fri 7:00 AM - 5:00 PM  Fees: Insurance, Self Pay   Phone: (118) 268-2151 Email: elisa@[employer] Website: https://www.[employer]/our-locations/[place]/[facility]/          Important Numbers & Websites       Emergency Services   911  [facility] Services   311  Poison Control   (199) 818-4896  Suicide Prevention Lifeline   (553) 612-4752 (TALK)  Child Abuse Hotline   (973) 747-1181 (4-A-Child)  Sexual Assault Hotline   (829) 951-7363  (HOPE)  National Runaway Safeline   (332) 256-4905 (RUNAWAY)  All-Options Talkline   (790) 124-8967  Substance Abuse Referral   (347) 191-9819 (HELP)

## 2022-06-03 NOTE — NURSING NOTE
Due to patient being non-English speaking/uses sign language, an  was used for this visit. Only for face-to-face interpretation by an external agency, date and length of interpretation can be found on the scanned worksheet.       name: Steve Pereyra (Htoo)  Language: Norma  Agency:  [employer]  Phone number: [phone]  Type of interpretation:  Face-to-face, spoken

## 2022-06-08 DIAGNOSIS — J45.909 ASTHMA: Primary | ICD-10-CM

## 2022-06-19 PROBLEM — G89.29 CHRONIC BILATERAL LOW BACK PAIN WITHOUT SCIATICA: Status: ACTIVE | Noted: 2020-07-30

## 2022-06-19 PROBLEM — M54.50 CHRONIC BILATERAL LOW BACK PAIN WITHOUT SCIATICA: Status: ACTIVE | Noted: 2020-07-30

## 2022-06-20 NOTE — ASSESSMENT & PLAN NOTE
Patient previously was seeing psychiatry and had been on antipsychotics in 2020.  He was subsequently transitioned to SSRI and on last check was only taking fluoxetine and as needed hydroxyzine which she finds very useful.  We will refill his medications today.  -     refill FLUoxetine (PROZAC) 10 MG capsule; Take 1 capsule (10 mg) by mouth daily  -     Refill hydrOXYzine (ATARAX) 50 MG tablet; Take 2 tablets (100 mg) by mouth At Bedtime  -  Contact info for prior psychiatrist Dr. Isaac Christianson  at [facility] in Treatment Team section of electronic health record if needed--staff RN is very helpful

## 2022-06-20 NOTE — ASSESSMENT & PLAN NOTE
I have been managing this patient for asthma for a couple of years.  No previous PFTs, so this was done a year and a half ago.  PFT did not show any inducible changes or suggest that patient has asthma. Lungs clear to auscultation in clinic always.  I suspected laryngospasm was to blame, however patient continues to report improvement in symptoms when he uses albuterol and describes symptoms as wheezing despite multiple attempts to clarify his meaning.  Says that triggers are cold air including air conditioning as well as smoking cigarettes which he is cut down significantly.  Given ongoing symptoms and improvement with current medications, will continue meds and refer to pulmonologist for further evaluation and management.  If this was truly asthma, we would transition to SMART therapy with Symbicort, however anticipate possible change in meds after patient sees pulmonology so we will hold off on changes for now.  -     refill/continue for now --albuterol (PROAIR HFA/PROVENTIL HFA/VENTOLIN HFA) 108 (90 Base) MCG/ACT inhaler; Inhale 2 puffs into the lungs every 6 hours as needed for shortness of breath / dyspnea or wheezing  -    Refill/continue for now--- fluticasone-salmeterol (ADVAIR HFA) 115-21 MCG/ACT inhaler; Inhale 2 puffs into the lungs daily  -     Adult Pulmonary Medicine Referral--would appreciate recommendations as to whether or not this is asthma given normal PFTs

## 2023-04-07 ENCOUNTER — OFFICE VISIT (OUTPATIENT)
Dept: FAMILY MEDICINE | Facility: CLINIC | Age: 28
End: 2023-04-07
Payer: COMMERCIAL

## 2023-04-07 VITALS
SYSTOLIC BLOOD PRESSURE: 137 MMHG | HEART RATE: 90 BPM | WEIGHT: 139.4 LBS | TEMPERATURE: 98.2 F | OXYGEN SATURATION: 97 % | RESPIRATION RATE: 16 BRPM | DIASTOLIC BLOOD PRESSURE: 92 MMHG | BODY MASS INDEX: 24.62 KG/M2

## 2023-04-07 DIAGNOSIS — F33.1 MAJOR DEPRESSIVE DISORDER, RECURRENT EPISODE, MODERATE (H): Primary | ICD-10-CM

## 2023-04-07 PROCEDURE — 99214 OFFICE O/P EST MOD 30 MIN: CPT | Mod: GC

## 2023-04-07 ASSESSMENT — PATIENT HEALTH QUESTIONNAIRE - PHQ9: SUM OF ALL RESPONSES TO PHQ QUESTIONS 1-9: 14

## 2023-04-07 NOTE — PROGRESS NOTES
Assessment & Plan     Depression with atypical features   History of depression with ongoing passive SI without plan or intention, no past history of SI attempts. Patient also reports non-commanding auditory hallucinations currently managed with Prozac and Hydroxyzine.  Patient had previously seen a psychiatrist who manages medications, no longer seeing, unable to see records per chart review though has been on other antipsychotics such as aripiprazole, unclear why these were discontinued.  Unclear official diagnosis such as depression with psychotic features versus bipolar versus schizophrenia versus other.  Unclear history of substance use, per chart review previous alcohol use.  Patient has had poor follow-up though reports overall significant improvement of symptoms from previous however ongoing depression and auditory hallucinations affecting ADLs, patient does not work or go to school.  Does report he is able to engage in some hobbies.  Unclear financial security.  Patient reports uncomfortable symptoms however does not want to act on SI, strong motivation to live, previous history of bringing in self to ED when SI was more active 5+ years prior.  No guns in the home.  Coping skills include meditation.  Patient does not want to restart therapy at this time, does not want to change medications at this time.  Does report he is not at goal with his mental health given significant effect on ADLs.  Management complicated by language barrier, poor health literacy, minimal follow-up, worsening depressive symptoms.  - follow up in 1 week for MH  - Crisis resources handout provided  - continue hydrOXYzine (ATARAX) 100 mg at bedtime  - continue FLUoxetine (PROZAC) 10 mg daily  -At follow-up visit: Possible SHANTAL if available, further review of history of medications and what worked or did not for patient.  Further history on coping mechanisms including substance use. Discuss sleep.       Brit Randle, MS3     Return in  about 1 week (around 4/14/2023) for Follow up MH.    I was present with the medical student who participated in the service and in the documentation of this note. I have verified the history and personally performed the physical exam and medical decision making, and have verified the content of the note, which accurately reflects my assessment of the patient and the plan of care.     Ernestine Robert MD  [facility]    Debbie Meyers is a 28 year old, presenting for the following health issues:  Depression        4/7/2023     4:10 PM   Additional Questions   Roomed by Troy   Accompanied by self     HPI     Mira has a long standing history of depression for which he has most recently being treated with Prozac for the past 5 years and Hydroxyzine for the past year. He is feeling good on this regimen. Sometimes he has really high days and sometimes he has really low days. On the high days he states his friends will tell him that he is speaking really fast. On the low days he has difficulty getting out of bed and leaving home. In the last 2 weeks he has had mostly low days. He currently does not attend school or work. He enjoys playing soccer, playing guitar, and going on [employer]. On his low days he finds it difficult to do these things. Since starting hydroxyzine he has been able to fall asleep and sleep throughout the night better. However, he feels a side effect of this medication is feeling drowsy and sleepy in the daytime. He is able to push through the drowsiness 50% of the time but the other 50% other days it is more difficult and he sleeps more during the day. His appetite and weight have been stable.     He has a past and current history of suicidal thoughts. He has not taken action on any past thoughts and does not have any current plans. He has self hospitalized twice due to fear from his thoughts. The most recent was in 2017. He reports some auditory hallucinations. No specific commanding  voices. These hallucinations do scare him. He denies any visual hallucinations. He has seen a therapist in the past but felt it did not help and he discontinued that service.     Social History     Tobacco Use     Smoking status: Every Day     Types: Cigarettes     Smokeless tobacco: Never     Tobacco comments:     1-2 per day    Substance Use Topics     Alcohol use: No     Alcohol/week: 0.0 standard drinks of alcohol     Drug use: No         11/10/2021     1:24 PM 6/3/2022    11:54 AM 4/7/2023     4:52 PM   PHQ   PHQ-9 Total Score 13 18 14   Q9: Thoughts of better off dead/self-harm past 2 weeks Not at all Not at all Not at all         12/11/2020     2:55 PM 6/28/2021     2:15 PM 7/13/2021     5:07 PM   ROSELYN-7 SCORE   Total Score 9 13 14         4/7/2023     4:52 PM   Last PHQ-9   1.  Little interest or pleasure in doing things 1   2.  Feeling down, depressed, or hopeless 2   3.  Trouble falling or staying asleep, or sleeping too much 3   4.  Feeling tired or having little energy 2   5.  Poor appetite or overeating 0   6.  Feeling bad about yourself 2   7.  Trouble concentrating 3   8.  Moving slowly or restless 1   Q9: Thoughts of better off dead/self-harm past 2 weeks 0   PHQ-9 Total Score 14         Review of Systems   ROS otherwise negative      Objective    BP (!) 137/92   Pulse 90   Temp 98.2  F (36.8  C) (Oral)   Resp 16   Wt 63.2 kg (139 lb 6.4 oz)   SpO2 97%   BMI 24.62 kg/m    Body mass index is 24.62 kg/m .  Physical Exam   GENERAL: healthy, alert and no distress  EYES: Eyes grossly normal to inspection, PERRL and conjunctivae and sclerae normal  NECK: no adenopathy, no asymmetry, masses, or scars, normal range of motion   RESP: breathing well on room air, no acute respiratory distress  MS: no gross musculoskeletal defects noted  PSYCH: mentation appears normal, flat affect, speech is low in tone, answering questions appropriately.  Attention is normal.  No auditory or visual hallucinations during  exam.

## 2023-04-07 NOTE — PATIENT INSTRUCTIONS
If you have thoughts about self harm or if you just need additional support and care, here are some resources for you:    Crisis Lines:    [facility] Adult Crisis:  [phone]     [facility] Multilingual Crisis Line:  [phone]    [place] [facility] Helpline: [phone] (Tuesdays and Fridays only from 6-9 pm)    [facility] Adult Crisis:  [phone]    988 - National Suicide Prevention Lifeline    Crisis Text Line: Text [place] to 117549. Free support at your fingertips 24/7  People who text [place] to 611840 will be connected with a counselor. Crisis Text Line is available 24 hours a day, seven days a week.    You can also consider going to the [facility] at the following address.  Walk ins are welcome:    [address] [place] [address]   [phone] (for 24-hour crisis consultation)    Monday - Friday 8:00am - 7:00pm  Saturday:  11:00am - 3:00pm  Sunday and Holidays Closed    If you feel at risk of immediate harm, go directly to the Emergency Department.

## 2023-04-07 NOTE — PROGRESS NOTES
Preceptor Attestation:  I discussed the patient with the resident and evaluated the patient in person. I have verified the content of the note, which accurately reflects my assessment of the patient and the plan of care.  Supervising Physician:  Radha Faria MD.

## 2023-04-17 ENCOUNTER — OFFICE VISIT (OUTPATIENT)
Dept: FAMILY MEDICINE | Facility: CLINIC | Age: 28
End: 2023-04-17
Payer: COMMERCIAL

## 2023-04-17 VITALS
BODY MASS INDEX: 23.01 KG/M2 | WEIGHT: 134.8 LBS | TEMPERATURE: 97.8 F | HEIGHT: 64 IN | RESPIRATION RATE: 22 BRPM | DIASTOLIC BLOOD PRESSURE: 96 MMHG | SYSTOLIC BLOOD PRESSURE: 135 MMHG | HEART RATE: 101 BPM | OXYGEN SATURATION: 98 %

## 2023-04-17 DIAGNOSIS — F33.1 MAJOR DEPRESSIVE DISORDER, RECURRENT EPISODE, MODERATE (H): Primary | ICD-10-CM

## 2023-04-17 PROCEDURE — 99207 E-CONSULT TO BEHAVIORAL HEALTH (ADULT OUTPT PROVIDER TO SPECIALIST WRITTEN QUESTION & RESPONSE): CPT

## 2023-04-17 PROCEDURE — 99213 OFFICE O/P EST LOW 20 MIN: CPT

## 2023-04-17 ASSESSMENT — ANXIETY QUESTIONNAIRES
3. WORRYING TOO MUCH ABOUT DIFFERENT THINGS: SEVERAL DAYS
GAD7 TOTAL SCORE: 13
GAD7 TOTAL SCORE: 13
6. BECOMING EASILY ANNOYED OR IRRITABLE: MORE THAN HALF THE DAYS
1. FEELING NERVOUS, ANXIOUS, OR ON EDGE: MORE THAN HALF THE DAYS
7. FEELING AFRAID AS IF SOMETHING AWFUL MIGHT HAPPEN: NEARLY EVERY DAY
IF YOU CHECKED OFF ANY PROBLEMS ON THIS QUESTIONNAIRE, HOW DIFFICULT HAVE THESE PROBLEMS MADE IT FOR YOU TO DO YOUR WORK, TAKE CARE OF THINGS AT HOME, OR GET ALONG WITH OTHER PEOPLE: SOMEWHAT DIFFICULT
5. BEING SO RESTLESS THAT IT IS HARD TO SIT STILL: SEVERAL DAYS
2. NOT BEING ABLE TO STOP OR CONTROL WORRYING: MORE THAN HALF THE DAYS

## 2023-04-17 NOTE — PROGRESS NOTES
Assessment & Plan     Depression with atypical features   Patient presenting for follow-up for mental health, current diagnosis of depression with psychotic features though unclear.  Reports no change to depression, noncommanding auditory hallucinations, ongoing passive SI.  No SI today.  No plan or intent, no past history of SI attempts per patient.  Previously seen through [facility], history of treatment includes Seroquel, olanzapine, aripiprazole, benztropine.  Stopped going due to patient preference, antipsychotic medications discontinued per patient due to some symptom improvement without full improvement, side effects such as weight gain and fatigue.previous alcohol and marijuana use, in remission for 2 years.  ADLs significantly affected, no school or work.  Unclear financial security. Management complicated by language barrier, poor health literacy, minimal follow-up, worsening depressive symptoms.  Psychiatry and therapy resources offered though declined by patient.  Reviewed crisis resources, patient declined handout today as he has this previously and reports he would call or present to ED if SI worsened.  Reviewed potential side effects for increase of fluoxetine.  Patient agreed for an E consult to be sent for advising around antipsychotic recommendations given his history and unclear diagnosis.  PHQ-9 reviewed and slightly decreased from previous, stable, increased from remote history.  - follow up in 2 week for MH  - Crisis resources handout provided  - continue hydrOXYzine (ATARAX) 100 mg at bedtime  - Increase FLUoxetine (PROZAC) 20 MG capsule; Take 1 capsule (20 mg) by mouth daily  Dispense: 30 capsule; Refill: 0  - Adult E-Consult to Behavioral Health (Outpt Provider to Specialist Written Question & Response)  -At follow-up visits: Review financial concerns, housing, legal, further discuss sleep, discuss daily routine    Ernestine Robert MD  [facility]    Debbie Meyers is a  "28 year old, presenting for the following health issues:  Depression (Follow-up depression)        4/17/2023     4:35 PM   Additional Questions   Roomed by carmen   Accompanied by self     HPI     \"up and down\" since last visit.  No significant changes.  No SI. Sleep is hard, but no nightmares.  Reports no further substance use, previously used alcohol and marijuana to help with sleep and appetite.  Reports he lives with the family of 4 due to a legal case, he is not related to them.  Reports he has good family support from his mom.  Feels safe at home.  Not interested in psychiatry or therapy today though wants to continue to follow-up here.  No particular concerns      Review of Systems     As above      Objective    BP (!) 135/96   Pulse 101   Temp 97.8  F (36.6  C) (Oral)   Resp 22   Ht 1.628 m (5' 4.1\")   Wt 61.1 kg (134 lb 12.8 oz)   SpO2 98%   BMI 23.07 kg/m    Body mass index is 23.07 kg/m .  Physical Exam   GENERAL: healthy, alert and no distress  EYES: Eyes grossly normal to inspection, PERRL and conjunctivae and sclerae normal  RESP: breathing well on room air, no acute respiratory distress  MS: no gross musculoskeletal defects noted  PSYCH: mentation appears normal, flat affect, speech is low in tone, answering questions appropriately.  Attention is normal.  No auditory or visual hallucinations during exam.    "

## 2023-04-19 ENCOUNTER — E-CONSULT (OUTPATIENT)
Dept: PSYCHIATRY | Facility: CLINIC | Age: 28
End: 2023-04-19
Payer: COMMERCIAL

## 2023-04-19 PROCEDURE — 99451 NTRPROF PH1/NTRNET/EHR 5/>: CPT | Performed by: PSYCHIATRY & NEUROLOGY

## 2023-04-19 NOTE — PROGRESS NOTES
4/19/2023     E-Consult has been accepted.    Interprofessional consultation requested by:  Ren Ralph MD      Clinical Question/Purpose: MY CLINICAL QUESTION IS: Medication recommendations. Pt was seen previously, [facility] documents in Epic, unclear diagnosis, likely MDD with psychotic features and PTSD. Previously on Aripiprazole, Seroquel, olanzapine. Stopped due to improvement of symptoms and side effects such as weight gain, fatigue. Unclear per patient how helpful they were. Now on Fluoxitine. Increasing that but would appreciate recs around starting antipsychotic and what to pay attention to with close follow-up. Recommended to patient to see a specialist but he declines. Thank you    Patient assessment and information reviewed: chart review    Recommendations:  I agree with continuing to try to suggest therapy.  This could be very helpful to him and not have any of the side effects he thought he had from meds before.  Therapy will not help with hallucinations, but can help with how to deal with them.  It can help with depression and struggling to go to work, etc.      I also agree with increasing prozac.  Twenty mg is still a low dose and he may need more (max 80 mg).  I do think from the history that having an antipsychotic on board before increasing too high makes sense with the vague history of having some hypomanic symptoms on occasion.  The antipsychotic can both help with the depression as an adjunct to the antidepressant but also help with the hallucinations and mood stabilization if bipolar is the diagnosis.  I also like prozac as it tends to help with energy as it is more activating than some of the other antidepressants.  This may be helpful for him.      My first choice is usually abilify due to it being less fatiguing and having less weight gain.  Since it is unclear what happened when he took it last, this can be brought up, but he may have a bias toward it.  If not, it is still a  good one to start.  Abilify 2 mg once a day is a good start and it can be increased up to 30 mg if needed and tolerated.  Symptoms should improve in about a week.  Usually if no signs of improvement and no side effects, then it is easier to go up to 5 mg due to the doses coming in 2 mg tabs and 5 mg tabs.  Then increasing by 5 mg if/when needed.  If any muscle stiffness, mouth/tongue movements or extreme restlessness occur, it should be stopped.  Monitoring cholesterol and diabetes is suggested.      Another option could be latuda.  It is similar to abilify in that it tends to have less weight gain and fatigue.  Latuda 20 mg once a day is a good start.  Usually one needs to get to 40 mg for improvements.  After one week of 20 mg if not better than increase to 40 mg.  After that, increases can be made every few weeks if needed.  It should be taken with food, preferably 350 calories or more to improve absorption.  Max is 160 mg.  If getting to the higher doses, it can be broken to twice a day to try to prevent fatigue from a larger one time dose.   If any muscle stiffness, mouth/tongue movements or extreme restlessness occur, it should be stopped.  Monitoring cholesterol and diabetes is suggested.        Thank you for the consult.      The recommendations provided in this E-Consult are based on a review of clinical data pertinent to the clinical question presented, without a review of the patient's complete medical record or, the benefit of a comprehensive in-person or virtual patient evaluation. This consultation should not replace the clinical judgement and evaluation of the provider ordering this E-Consult. Any new clinical issues, or changes in patient status since the filing of this E-Consult will need to be taken into account when assessing these recommendations. Please contact me if you have further questions.    My total time spent reviewing clinical information and formulating assessment was 15  minutes.        Barrett Green MD

## 2023-05-30 ENCOUNTER — OFFICE VISIT (OUTPATIENT)
Dept: FAMILY MEDICINE | Facility: CLINIC | Age: 28
End: 2023-05-30
Payer: COMMERCIAL

## 2023-05-30 VITALS
HEART RATE: 85 BPM | DIASTOLIC BLOOD PRESSURE: 91 MMHG | OXYGEN SATURATION: 97 % | TEMPERATURE: 97.7 F | RESPIRATION RATE: 20 BRPM | SYSTOLIC BLOOD PRESSURE: 133 MMHG | BODY MASS INDEX: 23.44 KG/M2 | WEIGHT: 137 LBS

## 2023-05-30 DIAGNOSIS — F33.1 MAJOR DEPRESSIVE DISORDER, RECURRENT EPISODE, MODERATE (H): Primary | ICD-10-CM

## 2023-05-30 DIAGNOSIS — R52 PAIN: ICD-10-CM

## 2023-05-30 PROCEDURE — 99213 OFFICE O/P EST LOW 20 MIN: CPT | Mod: GC | Performed by: STUDENT IN AN ORGANIZED HEALTH CARE EDUCATION/TRAINING PROGRAM

## 2023-05-30 RX ORDER — ACETAMINOPHEN 500 MG
500-1000 TABLET ORAL EVERY 6 HOURS PRN
Qty: 90 TABLET | Refills: 1 | Status: SHIPPED | OUTPATIENT
Start: 2023-05-30

## 2023-05-30 ASSESSMENT — PATIENT HEALTH QUESTIONNAIRE - PHQ9
SUM OF ALL RESPONSES TO PHQ QUESTIONS 1-9: 11
5. POOR APPETITE OR OVEREATING: MORE THAN HALF THE DAYS

## 2023-05-30 ASSESSMENT — ANXIETY QUESTIONNAIRES
GAD7 TOTAL SCORE: 11
7. FEELING AFRAID AS IF SOMETHING AWFUL MIGHT HAPPEN: NEARLY EVERY DAY
5. BEING SO RESTLESS THAT IT IS HARD TO SIT STILL: SEVERAL DAYS
3. WORRYING TOO MUCH ABOUT DIFFERENT THINGS: SEVERAL DAYS
GAD7 TOTAL SCORE: 11
1. FEELING NERVOUS, ANXIOUS, OR ON EDGE: SEVERAL DAYS
6. BECOMING EASILY ANNOYED OR IRRITABLE: MORE THAN HALF THE DAYS
IF YOU CHECKED OFF ANY PROBLEMS ON THIS QUESTIONNAIRE, HOW DIFFICULT HAVE THESE PROBLEMS MADE IT FOR YOU TO DO YOUR WORK, TAKE CARE OF THINGS AT HOME, OR GET ALONG WITH OTHER PEOPLE: SOMEWHAT DIFFICULT
2. NOT BEING ABLE TO STOP OR CONTROL WORRYING: SEVERAL DAYS

## 2023-05-30 NOTE — PROGRESS NOTES
Preceptor Attestation:  Vitals:    05/30/23 1621 05/30/23 1623   BP: (!) 136/95 (!) 133/91   BP Location: Left arm    Patient Position: Sitting    Cuff Size: Adult Regular    Pulse: 85    Resp: 20    Temp: 97.7  F (36.5  C)    TempSrc: Oral    SpO2: 97%    Weight: 62.1 kg (137 lb)           I discussed the patient with the resident and evaluated the patient in person. I have verified the content of the note, which accurately reflects my assessment of the patient and the plan of care.   Supervising Physician:  Guerda Castro MD

## 2023-05-30 NOTE — PROGRESS NOTES
There are no exam notes on file for this visit.    Assessment & Plan    1. Major depressive disorder, recurrent episode, moderate (H)  PHQ-9 score 11 today, much improved than prior PHQ-9's, most notable improvement is no more SI at this time.  Patient feels much improved on this current dose of fluoxetine, does not want to increase at this time. Patient denies mental health referral at this time.  - FLUoxetine (PROZAC) 20 MG capsule; Take 1 capsule (20 mg) by mouth daily  Dispense: 30 capsule; Refill: 6  - Recommended patient follow-up in 3 months    2. Pain  Refilled  - acetaminophen (TYLENOL) 500 MG tablet; Take 1-2 tablets (500-1,000 mg) by mouth every 6 hours as needed for mild pain  Dispense: 90 tablet; Refill: 1      For today's visit, I reviewed patient's PMH, PSH, FH, Medications, Allergies, Immunizations, and notes from most recent clinic encounter(s).  Patient's diagnosis, treatment, and care coordination is limited due to social determinants of health - Limited income, low health literacy, language barrier  35 minutes spent on the date of the encounter doing chart review, history and exam, documentation, care coordination, and further activities per the note      Benita Behm, MD PGY3  [facility] Medicine Residency  05/30/23    I precepted today with Dr. Castro.    Debbie Segundo is a 28 year old who presents for the following health issues: depression    HPI    Patient is here to follow-up on his depression.  He reports that he recently had his Prozac increased to 20 mg, feels that this is a very good dose for him.  He reports that his mood is much better, he is no longer having suicidal ideation or thoughts of harming himself.  He has been spending more time outside, reports that this is boosted his mood.  He reports he has not scheduled an appointment to speak with a therapist, he reports he has never seen a therapist in the past, he reports he is not interested in this therapy at this time.   He has not noted any adverse side effects from the Prozac.    Review of Systems  Constitutional, HEENT, cardiovascular, pulmonary, gi and gu systems are negative, except as otherwise noted.    Objective   BP (!) 133/91   Pulse 85   Temp 97.7  F (36.5  C) (Oral)   Resp 20   Wt 62.1 kg (137 lb)   SpO2 97%   BMI 23.44 kg/m    Physical Exam  GENERAL: Awake, alert, cooperative, no apparent distress  EYES: Lids and lashes normal, pupils equal, round and reactive to light, extra ocular muscles intact, sclera clear, conjunctiva normal. No hypopyon, hyphema, or pterygium.  HENT: Normocephalic, without obvious abnormality, atraumatic, external ears without lesions.  NECK: Supple, symmetrical, trachea midline, skin normal.  LUNGS: No audible wheezing or stridor, no increased work of breathing, talks in full sentences  MSK: Full range of motion noted. Tone is normal.  NEURO: Awake, alert, oriented to name, place and time.  Cranial nerves II-XII are grossly intact. Gait is normal.  NEUROPSYCH: Flat affect, normal mood, orientation, and memory. Coherent speech, normal rate and volume, able to articulate logical thoughts, able to abstract reason, no tangential thoughts, no hallucinations or delusions   SKIN: No visible rashes, erythema, pallor, ecchymosis, petechia or purpura.      ----- Service Performed and Documented by Resident or Fellow ------

## 2023-08-22 DIAGNOSIS — J45.30 MILD PERSISTENT ASTHMA WITHOUT COMPLICATION: ICD-10-CM

## 2023-08-22 DIAGNOSIS — F33.1 MAJOR DEPRESSIVE DISORDER, RECURRENT EPISODE, MODERATE (H): ICD-10-CM

## 2023-08-24 RX ORDER — ALBUTEROL SULFATE 90 UG/1
AEROSOL, METERED RESPIRATORY (INHALATION)
Qty: 18 G | Refills: 1 | Status: SHIPPED | OUTPATIENT
Start: 2023-08-24 | End: 2023-12-11

## 2023-08-24 RX ORDER — HYDROXYZINE HYDROCHLORIDE 50 MG/1
100 TABLET, FILM COATED ORAL AT BEDTIME
Qty: 60 TABLET | Refills: 1 | Status: SHIPPED | OUTPATIENT
Start: 2023-08-24 | End: 2023-12-11

## 2023-12-11 DIAGNOSIS — J45.30 MILD PERSISTENT ASTHMA WITHOUT COMPLICATION: ICD-10-CM

## 2023-12-11 DIAGNOSIS — F33.1 MAJOR DEPRESSIVE DISORDER, RECURRENT EPISODE, MODERATE (H): ICD-10-CM

## 2023-12-11 RX ORDER — FLUTICASONE PROPIONATE AND SALMETEROL XINAFOATE 115; 21 UG/1; UG/1
AEROSOL, METERED RESPIRATORY (INHALATION)
Qty: 12 G | Refills: 1 | Status: SHIPPED | OUTPATIENT
Start: 2023-12-11

## 2023-12-11 RX ORDER — ALBUTEROL SULFATE 90 UG/1
2 AEROSOL, METERED RESPIRATORY (INHALATION) EVERY 6 HOURS PRN
Qty: 18 G | Refills: 1 | Status: SHIPPED | OUTPATIENT
Start: 2023-12-11 | End: 2024-03-11

## 2023-12-11 RX ORDER — HYDROXYZINE HYDROCHLORIDE 50 MG/1
100 TABLET, FILM COATED ORAL AT BEDTIME
Qty: 60 TABLET | Refills: 0 | Status: SHIPPED | OUTPATIENT
Start: 2023-12-11 | End: 2024-01-18

## 2024-01-17 DIAGNOSIS — F33.1 MAJOR DEPRESSIVE DISORDER, RECURRENT EPISODE, MODERATE (H): ICD-10-CM

## 2024-01-18 RX ORDER — HYDROXYZINE HYDROCHLORIDE 50 MG/1
100 TABLET, FILM COATED ORAL AT BEDTIME
Qty: 60 TABLET | Refills: 0 | Status: SHIPPED | OUTPATIENT
Start: 2024-01-18 | End: 2024-05-20

## 2024-01-18 NOTE — TELEPHONE ENCOUNTER
Medication requested: HYDROXYZINE HCL 50 MG TAB 50 Tablet   Last office visit: 5/30/23  Future [facility] appointments: none  Medication last refilled: 12/11/23  Last qualifying labs: none    Prescription approved per [facility] Refill Protocol.  Antihistamines Protocol [id number]/17/2024 12:37 PM   Protocol Details Recent (12 mo) or future (30 days) visit within the authorizing provider's specialty    Patient is age 3 or older    Medication is active on med list     THUAN Puente, BSN

## 2024-03-08 DIAGNOSIS — J45.30 MILD PERSISTENT ASTHMA WITHOUT COMPLICATION: ICD-10-CM

## 2024-03-11 RX ORDER — ALBUTEROL SULFATE 90 UG/1
2 AEROSOL, METERED RESPIRATORY (INHALATION) EVERY 6 HOURS PRN
Qty: 18 G | Refills: 1 | Status: SHIPPED | OUTPATIENT
Start: 2024-03-11

## 2024-03-11 NOTE — TELEPHONE ENCOUNTER
Prescription approved per [facility] Refill Protocol.   Asthma control assessment score within normal limits in last 6 months    Recent (6 mo) or future (30 days) visit within the authorizing provider's specialty     Medication requested: VENTOLIN HFA 90 MCG INHALER 108 (90 BAS Aerosol   Last office visit: 5/30/2023  Future [facility] appointments: none  Medication last refilled: 1/17/2024   Last qualifying labs: No Asthma control assessment plan in last 6 months    Routed to provider due to failed RN protocol.     THUAN Vasquez

## 2024-05-20 ENCOUNTER — OFFICE VISIT (OUTPATIENT)
Dept: FAMILY MEDICINE | Facility: CLINIC | Age: 29
End: 2024-05-20
Payer: COMMERCIAL

## 2024-05-20 VITALS
RESPIRATION RATE: 20 BRPM | DIASTOLIC BLOOD PRESSURE: 108 MMHG | BODY MASS INDEX: 23.29 KG/M2 | WEIGHT: 136.4 LBS | SYSTOLIC BLOOD PRESSURE: 144 MMHG | OXYGEN SATURATION: 95 % | HEART RATE: 95 BPM | HEIGHT: 64 IN | TEMPERATURE: 98.1 F

## 2024-05-20 DIAGNOSIS — J45.30 MILD PERSISTENT ASTHMA WITHOUT COMPLICATION: ICD-10-CM

## 2024-05-20 DIAGNOSIS — F33.1 MAJOR DEPRESSIVE DISORDER, RECURRENT EPISODE, MODERATE (H): Primary | ICD-10-CM

## 2024-05-20 DIAGNOSIS — J30.1 SEASONAL ALLERGIC RHINITIS DUE TO POLLEN: ICD-10-CM

## 2024-05-20 PROCEDURE — 99214 OFFICE O/P EST MOD 30 MIN: CPT | Mod: GC

## 2024-05-20 RX ORDER — GABAPENTIN 100 MG/1
100 CAPSULE ORAL AT BEDTIME
Qty: 90 CAPSULE | Refills: 0 | Status: SHIPPED | OUTPATIENT
Start: 2024-05-20

## 2024-05-20 RX ORDER — CETIRIZINE HYDROCHLORIDE 10 MG/1
10 TABLET ORAL DAILY
Qty: 90 TABLET | Refills: 3 | Status: SHIPPED | OUTPATIENT
Start: 2024-05-20

## 2024-05-20 RX ORDER — FLUTICASONE PROPIONATE 50 MCG
2 SPRAY, SUSPENSION (ML) NASAL DAILY
Qty: 16 G | Refills: 3 | Status: SHIPPED | OUTPATIENT
Start: 2024-05-20

## 2024-05-20 RX ORDER — BUDESONIDE AND FORMOTEROL FUMARATE DIHYDRATE 160; 4.5 UG/1; UG/1
AEROSOL RESPIRATORY (INHALATION)
Qty: 20.4 G | Refills: 11 | Status: SHIPPED | OUTPATIENT
Start: 2024-05-20

## 2024-05-20 ASSESSMENT — ANXIETY QUESTIONNAIRES
GAD7 TOTAL SCORE: 5
2. NOT BEING ABLE TO STOP OR CONTROL WORRYING: SEVERAL DAYS
GAD7 TOTAL SCORE: 5
4. TROUBLE RELAXING: NOT AT ALL
7. FEELING AFRAID AS IF SOMETHING AWFUL MIGHT HAPPEN: SEVERAL DAYS
6. BECOMING EASILY ANNOYED OR IRRITABLE: SEVERAL DAYS
IF YOU CHECKED OFF ANY PROBLEMS ON THIS QUESTIONNAIRE, HOW DIFFICULT HAVE THESE PROBLEMS MADE IT FOR YOU TO DO YOUR WORK, TAKE CARE OF THINGS AT HOME, OR GET ALONG WITH OTHER PEOPLE: SOMEWHAT DIFFICULT
5. BEING SO RESTLESS THAT IT IS HARD TO SIT STILL: SEVERAL DAYS
3. WORRYING TOO MUCH ABOUT DIFFERENT THINGS: SEVERAL DAYS
GAD7 TOTAL SCORE: 5
8. IF YOU CHECKED OFF ANY PROBLEMS, HOW DIFFICULT HAVE THESE MADE IT FOR YOU TO DO YOUR WORK, TAKE CARE OF THINGS AT HOME, OR GET ALONG WITH OTHER PEOPLE?: SOMEWHAT DIFFICULT
7. FEELING AFRAID AS IF SOMETHING AWFUL MIGHT HAPPEN: SEVERAL DAYS
1. FEELING NERVOUS, ANXIOUS, OR ON EDGE: NOT AT ALL

## 2024-05-20 ASSESSMENT — PATIENT HEALTH QUESTIONNAIRE - PHQ9
SUM OF ALL RESPONSES TO PHQ QUESTIONS 1-9: 12
10. IF YOU CHECKED OFF ANY PROBLEMS, HOW DIFFICULT HAVE THESE PROBLEMS MADE IT FOR YOU TO DO YOUR WORK, TAKE CARE OF THINGS AT HOME, OR GET ALONG WITH OTHER PEOPLE: SOMEWHAT DIFFICULT
SUM OF ALL RESPONSES TO PHQ QUESTIONS 1-9: 12

## 2024-05-20 ASSESSMENT — ASTHMA QUESTIONNAIRES
QUESTION_3 LAST FOUR WEEKS HOW OFTEN DID YOUR ASTHMA SYMPTOMS (WHEEZING, COUGHING, SHORTNESS OF BREATH, CHEST TIGHTNESS OR PAIN) WAKE YOU UP AT NIGHT OR EARLIER THAN USUAL IN THE MORNING: ONCE A WEEK
QUESTION_5 LAST FOUR WEEKS HOW WOULD YOU RATE YOUR ASTHMA CONTROL: SOMEWHAT CONTROLLED
ACT_TOTALSCORE: 16
QUESTION_2 LAST FOUR WEEKS HOW OFTEN HAVE YOU HAD SHORTNESS OF BREATH: ONCE OR TWICE A WEEK
QUESTION_1 LAST FOUR WEEKS HOW MUCH OF THE TIME DID YOUR ASTHMA KEEP YOU FROM GETTING AS MUCH DONE AT WORK, SCHOOL OR AT HOME: SOME OF THE TIME
QUESTION_4 LAST FOUR WEEKS HOW OFTEN HAVE YOU USED YOUR RESCUE INHALER OR NEBULIZER MEDICATION (SUCH AS ALBUTEROL): TWO OR THREE TIMES PER WEEK
ACT_TOTALSCORE: 16

## 2024-05-20 NOTE — PATIENT INSTRUCTIONS
Good sleep hygiene practices:   Establish a regular sleep schedule: Go to bed at same time; Get up at same time each day   Avoid sleeping-in on Sunday morning   Cut down time in bed (if not asleep, get up)  Use your bed only for sleep and sex; Do not read or watch television in bed   Make the bedroom comfortable:  keep it quiet, cool, dark  Consider ear plugs (silicon)   Deal with your worries before bedtime:  set aside a worry time for 30 minutes earlier  Relaxation: Relax at bedtime with deep breathing, meditation, relaxing each muscle group individually; begin with feet, work toward head . Can also try listening to relaxing sounds: classical music, ASMR, or nature sounds  Perform measures to make you tired at bedtime:               --Get regular Exercise each day (6 hours before bedtime)                --Take medications only as directed                --Eat a light bedtime snack or warm drink               --Warm milk or camomile tea (non-caffeinated)     Things to avoid   Do not exercise 2-3 hours before bedtime   No overstimulating activities just before bed   No competitive games before bedtime   No exciting television programs before bedtime   Avoid caffeine (coffee, soda, caffeinated teas) after lunchtime   Do not use alcohol to induce sleep (worsens middle evening insomnia)   Do not take someone else's sleeping pills   Do not look at the clock when awakening   Do not turn on light when getting up to use bathroom     Avoid exposure to blue light in the late evening:  The blue light from electronic screens (computers, phones, tablets) keeps your brain from releasing melatonin, the brain chemical that makes you feel sleepy. So, you might have a harder time falling asleep. Try dimming the smartphone or tablet brightness settings and holding the device at least 14 inches from your face while using it can help. Or, avoid the screens altogether and read a book.      Further treatment of sleep issues:  CBT-I is  very effective in improving sleep.   Cognitive behavioral treatment for insomnia (CBT-I) is a very effective technique that involves changing behaviors and thoughts associated with sleep. Addresses dysfunctional beliefs and negative thoughts related to insomnia.       Other safe forms of treatment  Melatonin - at the start can take 3-10mg 1 hour before bedtime for the first few weeks (3-9 weeks), then down to 0.5-1mg for maintenance  Herbals (tea) -stay away from [facility] due to liver toxicity risk; otherwise- valerian, chamomile, lavendar, passion fruit, ginseng all can help with sleep.   Use light box for 2 hours every morning

## 2024-05-20 NOTE — PROGRESS NOTES
"  Assessment & Plan     Major depressive disorder, recurrent episode, moderate (H)  Patient reports being out of medication for 2.5 weeks with depressive mood as a result. When taking Prozac, patient reports daily headaches. When taking hydroxyzine for sleep, patient reports daily nausea. Consider switching to gabapentin to reduce nausea. Patient was counseled about taking medications consistently after he reported taking a day off from medications once a week to try and reduce side effects.  Despite headaches with Prozac patient reports overall benefit and side effects being tolerable would like to continue.  PHQ-9 score today is 12. ROSELYN 7 score today is 5.  Patient reports sleep as significant concern and did discuss recommendation for CBT-I, discussed this in depth.  Patient would like to hold off on this for now.  - FLUoxetine (PROZAC) 20 MG capsule; Take 1 capsule (20 mg) by mouth daily  - discontinue hydroxyzine  -Start gabapentin (NEURONTIN) 100 MG capsule; Take 1 capsule (100 mg) by mouth at bedtime  -Recommend close follow-up, 1 month or sooner if needed    Mild persistent asthma without complication  Patient has mild asthma and has been out of medications for the past 2.5 weeks. He denies shortness of breath, but is in need of medication refill.  Unclear diagnosis, patient reports he has been seen by pulmonologist that unable to see this in records.  Reports he was told he does not have \" true\" asthma so that the inhalers significantly help him.  Discussed switching to Smart therapy to simplify inhaler regimen and that he should consider discontinuing altogether versus following up with pulmonology if he does not have asthma or if diagnosed with unclear.  - budesonide-formoterol (SYMBICORT) 160-4.5 MCG/ACT Inhaler; Inhale 2 puffs once daily plus 1-2 puffs as needed. May use up to 12 puffs per day.    Seasonal allergic rhinitis due to pollen  Patient suffers from seasonal allergies. No recent exacerbation " "or changes.  Refill.  - cetirizine (ZYRTEC) 10 MG tablet; Take 1 tablet (10 mg) by mouth daily  - fluticasone (FLONASE) 50 MCG/ACT nasal spray; Spray 2 sprays into both nostrils daily      Future visit: Monitor BP, no current diagnosis of hypertension.      Subjective   Lo is a 29 year old, presenting for the following health issues:  Other (Follow-up /)      5/20/2024    11:26 AM   Additional Questions   Roomed by carmen   Accompanied by self         5/20/2024    Information    services provided? Yes   Language Norma   Type of interpretation provided Face-to-face    name Ta    [employer]     Patient is a 29 year-old male with a history of major depressive disorder, mild asthma, and seasonal allergies who presents today for follow-up and medication refill. Patient reports he has been out of his medications for 2.5 weeks. Since being out of his Prozac, patient reports his mood has been \"not so great.\" He also has not had hydroxyzine which he previously took every night to help with sleep and has begun drinking 2 shots every 3-4 days to help with sleep instead. He has also been seeing friends and playing sports outside to try and cope during this period without medications.    When taking medications consistently, patient reports his mood is \"good,\" however he reports side effects of daily headaches in the afternoons after taking Prozac, and nausea in the mornings after taking hydroxyzine at night. When discussing potential medication changes, patient reports trialing various antidepressive medications in the past and states Prozac has been the best. Despite the side effects he would like to stay on his current medication regimen. Of note, patient reports taking one day off a week from his medications to try and reduce side effects.      Objective    BP (!) 144/108   Pulse 95   Temp 98.1  F (36.7  C) (Oral)   Resp 20   Ht 1.613 m (5' 3.5\")   Wt 61.9 kg (136 " lb 6.4 oz)   SpO2 95%   BMI 23.78 kg/m    Body mass index is 23.78 kg/m .  Physical Exam   GENERAL: alert and no distress  RESP: lungs clear to auscultation - no rales, rhonchi or wheezes  CV: regular rate and rhythm, normal S1 S2, no S3 or S4, no murmur, click or rub, no peripheral edema  MS: no gross musculoskeletal defects noted, no edema    PHQ-2 Score:         5/20/2024    11:26 AM 5/30/2023     4:20 PM   PHQ-2 ( 1999 [employer])   Q1: Little interest or pleasure in doing things 1 1   Q2: Feeling down, depressed or hopeless 2 3   PHQ-2 Score 3 4   Q1: Little interest or pleasure in doing things Several days    Q2: Feeling down, depressed or hopeless More than half the days    PHQ-2 Score 3            Signed Electronically by: Ernestine Robert MD    .undefined[^^  Answers submitted by the patient for this visit:  Patient Health Questionnaire (Submitted on 5/20/2024)  If you checked off any problems, how difficult have these problems made it for you to do your work, take care of things at home, or get along with other people?: Somewhat difficult  PHQ9 TOTAL SCORE: 12  ROSELYN-7 (Submitted on 5/20/2024)  ROSELYN 7 TOTAL SCORE: 5

## 2024-05-23 NOTE — PROGRESS NOTES
[facility], [place]    Physician Attestation   I, Allen De La Rosa MD, saw this patient and agree with the findings and plan of care as documented in the note.      Items personally reviewed/procedural attestation: vitals.        11/10/2021     1:30 PM 6/3/2022    11:54 AM 5/20/2024    11:32 AM   ACT Total Scores   ACT TOTAL SCORE (Goal Greater than or Equal to 20) 18 14 16   In the past 12 months, how many times did you visit the emergency room for your asthma without being admitted to the hospital? 0 0 0   In the past 12 months, how many times were you hospitalized overnight because of your asthma? 0 0 0     Patient has low ACT score. But PFT in 2021 was not consistent with asthma. Could broaden differential based on history and look for triggers of difficult breathing. Try and use a journal to determine when symptoms are exacerbated could be bronchospasm due to multiple triggers vs eilo if exercise induced vs VCD. Next steps would be get a detailed history and consider further testing.     Allen De La Rosa MD       SLP eval    - Social: Misbah Lira ([phone]) updated post-procedurally per request of Tara (sister and primary decision-maker).     The Neuro critical Staff is Dr. Medley.     Tatianna Stout MD  Neurology PGY1  P: [phone]    ______________________________________________________    Destinee Morfin MD   nystatin (MYCOSTATIN) [id number] [facility] 

## 2024-06-21 ENCOUNTER — OFFICE VISIT (OUTPATIENT)
Dept: FAMILY MEDICINE | Facility: CLINIC | Age: 29
End: 2024-06-21
Payer: COMMERCIAL

## 2024-06-21 VITALS
TEMPERATURE: 98.6 F | WEIGHT: 140.4 LBS | RESPIRATION RATE: 20 BRPM | HEART RATE: 75 BPM | SYSTOLIC BLOOD PRESSURE: 145 MMHG | HEIGHT: 63 IN | DIASTOLIC BLOOD PRESSURE: 102 MMHG | BODY MASS INDEX: 24.88 KG/M2 | OXYGEN SATURATION: 99 %

## 2024-06-21 DIAGNOSIS — F33.1 MAJOR DEPRESSIVE DISORDER, RECURRENT EPISODE, MODERATE (H): Primary | ICD-10-CM

## 2024-06-21 DIAGNOSIS — R20.0 NUMBNESS: ICD-10-CM

## 2024-06-21 DIAGNOSIS — R03.0 ELEVATED BLOOD PRESSURE READING WITHOUT DIAGNOSIS OF HYPERTENSION: ICD-10-CM

## 2024-06-21 LAB
ERYTHROCYTE [DISTWIDTH] IN BLOOD BY AUTOMATED COUNT: 17.8 % (ref 10–15)
HCT VFR BLD AUTO: 33.6 % (ref 40–53)
HGB BLD-MCNC: 10.7 G/DL (ref 13.3–17.7)
MCH RBC QN AUTO: 20.4 PG (ref 26.5–33)
MCHC RBC AUTO-ENTMCNC: 31.8 G/DL (ref 31.5–36.5)
MCV RBC AUTO: 64 FL (ref 78–100)
PLATELET # BLD AUTO: 341 10E3/UL (ref 150–450)
RBC # BLD AUTO: 5.24 10E6/UL (ref 4.4–5.9)
WBC # BLD AUTO: 4.6 10E3/UL (ref 4–11)

## 2024-06-21 PROCEDURE — 36415 COLL VENOUS BLD VENIPUNCTURE: CPT

## 2024-06-21 PROCEDURE — 83540 ASSAY OF IRON: CPT

## 2024-06-21 PROCEDURE — 84443 ASSAY THYROID STIM HORMONE: CPT

## 2024-06-21 PROCEDURE — 85027 COMPLETE CBC AUTOMATED: CPT

## 2024-06-21 PROCEDURE — 82728 ASSAY OF FERRITIN: CPT

## 2024-06-21 PROCEDURE — 83550 IRON BINDING TEST: CPT

## 2024-06-21 PROCEDURE — 99214 OFFICE O/P EST MOD 30 MIN: CPT | Mod: GC

## 2024-06-21 ASSESSMENT — ANXIETY QUESTIONNAIRES
GAD7 TOTAL SCORE: 4
5. BEING SO RESTLESS THAT IT IS HARD TO SIT STILL: NOT AT ALL
GAD7 TOTAL SCORE: 4
7. FEELING AFRAID AS IF SOMETHING AWFUL MIGHT HAPPEN: SEVERAL DAYS
6. BECOMING EASILY ANNOYED OR IRRITABLE: SEVERAL DAYS
1. FEELING NERVOUS, ANXIOUS, OR ON EDGE: NOT AT ALL
2. NOT BEING ABLE TO STOP OR CONTROL WORRYING: SEVERAL DAYS
3. WORRYING TOO MUCH ABOUT DIFFERENT THINGS: SEVERAL DAYS
IF YOU CHECKED OFF ANY PROBLEMS ON THIS QUESTIONNAIRE, HOW DIFFICULT HAVE THESE PROBLEMS MADE IT FOR YOU TO DO YOUR WORK, TAKE CARE OF THINGS AT HOME, OR GET ALONG WITH OTHER PEOPLE: NOT DIFFICULT AT ALL

## 2024-06-21 ASSESSMENT — ASTHMA QUESTIONNAIRES: ACT_TOTALSCORE: 20

## 2024-06-21 ASSESSMENT — PATIENT HEALTH QUESTIONNAIRE - PHQ9
5. POOR APPETITE OR OVEREATING: NOT AT ALL
SUM OF ALL RESPONSES TO PHQ QUESTIONS 1-9: 11

## 2024-06-21 NOTE — PROGRESS NOTES
Assessment & Plan     Major depressive disorder, recurrent episode, moderate (H)  Follow-up.  History: Previously diagnosed with depression with psychotic features, previously had been treated with Seroquel, olanzapine, aripiprazole, benztropine however stopped due to patient preference.  Did have improvement of symptoms.  Previous use of alcohol and marijuana, in remission, no return to use.  Previous visit: Restarted fluoxetine and started gabapentin to help with anxiety and with sleep.  Sleep was primary concern.  Today: Reports gabapentin has been helpful for sleep, thinks fluoxetine dose is appropriate.  No SI.  - Continue fluoxetine 20mg  - Can trial off gabapentin to see if this changes his symptoms of numbness  -Does not want therapy, declines CBT-I    Numbness  Reporting new symptom of generalized weakness for 2 weeks.  Primarily central however area changes day today.  No ascending numbness, no associated weakness, gait and neuroexam intact, no abnormalities.  Timeline sits for when he started gabapentin, does not appear to be a listed side effect however given history and exam are otherwise reassuring okay to trial off gabapentin to see if this changes symptoms.  - CBC with Platelets and Reflex to Iron Studies  - TSH with free T4 reflex  - Iron & Iron Binding Capacity  - Ferritin  - Can trial off gabapentin to see if this changes his symptoms of numbness    Elevated blood pressure reading without diagnosis of hypertension  Has had ongoing elevations, will do home BP monitoring to rule out white coat HTN.   - Home Blood Pressure Monitor Order for DME - ONLY FOR DME      Subjective   Mira is a 29 year old, presenting for the following health issues:  RECHECK (Follow-up act, and sleep)      6/21/2024     1:19 PM   Additional Questions   Roomed by carmen   Accompanied by self         6/21/2024    Information    services provided? Yes   Isaias Green   Type of interpretation provided  "Face-to-face    name Ta    Agency [employer]     Mood, sleep and asthma all feel better. Medicines going well.     2 weeks of full body numbness sensation. Started at thigh first and then spread outwards, up and lower. Not happened before. Mostly central, area of numbness changes.     No changes to gait or     Not going to therapy, not interested      Objective    BP (!) 145/102 (BP Location: Left arm, Patient Position: Sitting, Cuff Size: Adult Regular)   Pulse 75   Temp 98.6  F (37  C) (Oral)   Resp 20   Ht 1.6 m (5' 3\")   Wt 63.7 kg (140 lb 6.4 oz)   SpO2 99%   BMI 24.87 kg/m    Body mass index is 24.87 kg/m .  Physical Exam   GENERAL: alert and no distress  EYES: Eyes grossly normal to inspection, PERRL and conjunctivae and sclerae normal  RESP: lungs clear to auscultation - no rales, rhonchi or wheezes  CV: regular rate and rhythm, normal S1 S2, no S3 or S4, no murmur  MS: no gross musculoskeletal defects noted, no edema  NEURO: Normal strength and tone, sensory exam grossly normal, mentation intact, cranial nerves 2-12 intact, DTR's normal and symmetric, gait normal including heel/toe/tandem walking, Romberg normal, and rapid alternating movements normal  PSYCH: mentation appears normal, affect anxious appearing        Signed Electronically by: Ernestine Robert MD    "

## 2024-06-22 LAB
FERRITIN SERPL-MCNC: 135 NG/ML (ref 31–409)
IRON BINDING CAPACITY (ROCHE): 272 UG/DL (ref 240–430)
IRON SATN MFR SERPL: 21 % (ref 15–46)
IRON SERPL-MCNC: 56 UG/DL (ref 61–157)
TSH SERPL DL<=0.005 MIU/L-ACNC: 0.82 UIU/ML (ref 0.3–4.2)

## 2024-06-24 NOTE — PROGRESS NOTES
Physician Attestation   I, Allen De La Rosa MD, saw this patient and agree with the findings and plan of care as documented in the note.      Items personally reviewed/procedural attestation: vitals.    Allen De La Rosa MD

## 2024-06-25 ENCOUNTER — APPOINTMENT (OUTPATIENT)
Dept: INTERPRETER SERVICES | Facility: CLINIC | Age: 29
End: 2024-06-25
Payer: COMMERCIAL

## 2025-01-07 DIAGNOSIS — F33.1 MAJOR DEPRESSIVE DISORDER, RECURRENT EPISODE, MODERATE (H): ICD-10-CM

## 2025-01-07 RX ORDER — GABAPENTIN 100 MG/1
100 CAPSULE ORAL AT BEDTIME
Qty: 30 CAPSULE | Refills: 0 | Status: SHIPPED | OUTPATIENT
Start: 2025-01-07

## 2025-01-07 NOTE — TELEPHONE ENCOUNTER
Name from pharmacy: GABAPENTIN 100 MG CAPS 100 Capsule          Will file in chart as: gabapentin (NEURONTIN) 100 MG capsule    Sig: Take 1 capsule (100 mg) by mouth at bedtime    Disp: 30 capsule    Refills: 0    Start: 1/7/2025    Class: E-Prescribe    Non-formulary For: Major depressive disorder, recurrent episode, moderate (H)    Last ordered: 7 months ago (5/20/2024) by Allen De La Rosa MD    Last refill: 7/11/2024    Rx #: [id number]        Name from pharmacy: FLUOXETINE HCL 20 MG CAPS 20 Capsule         Will file in chart as: FLUoxetine (PROZAC) 20 MG capsule    Sig: Take 1 capsule (20 mg) by mouth daily    Disp: 90 capsule    Refills: 0    Start: 1/7/2025    Class: E-Prescribe    Non-formulary For: Major depressive disorder, recurrent episode, moderate (H)    Last ordered: 7 months ago (5/20/2024) by Allen De La Rosa MD    Last refill: 5/20/2024    Rx #: [id number]    SSRIs Protocol [id number]/07/2025 11:09 AM   Protocol Details PHQ-9 score less than 5 in past 6 months        Cindi, RN, BSN

## 2025-04-10 NOTE — PATIENT INSTRUCTIONS
Preventive Health Recommendations  Male Ages 18 - 25     Yearly exam:             See your health care provider every year in order to  o   Review health changes.   o   Discuss preventive care.    o   Review your medicines if your doctor has prescribed any.    You should be tested each year for STDs (sexually transmitted diseases).     Talk to your provider about cholesterol testing.      If you are at risk for diabetes, you should have a diabetes test (fasting glucose).    Shots: Get a flu shot each year. Get a tetanus shot every 10 years.     Nutrition:    Eat at least 5 servings of fruits and vegetables daily.     Eat whole-grain bread, whole-wheat pasta and brown rice instead of white grains and rice.     Talk to your provider about calcium and Vitamin D.     Lifestyle    Exercise for at least 150 minutes a week (30 minutes a day, 5 days a week). This will help you control your weight and prevent disease.     Limit alcohol to one drink per day.     No smoking.     Wear sunscreen to prevent skin cancer.     See your dentist every six months for an exam and cleaning.     
4 = No assist / stand by assistance

## 2025-07-15 NOTE — TELEPHONE ENCOUNTER
"JUAN M alerted that [facility] from [facility] had left a message ([phone]). JUAN M notes that clinic has not received an SHANTAL for these records. Called masood Ku requesting call back.    ADDENDUM 8/8/2019 1:23 PM:  Received call from [facility], he requests mental health records and DA. JUAN M will await an SHANTAL from [facility].    ADDENDUM 8/8/2019 2:30 PM:  Received SHANTAL from [facility]. Review of documentation does not show a DA within the past year. Contacted masood Ku.    ADDENDUM 8/8/2019 2:53 PM:  Received call from Kings inquiring whether the pt has Bipolar, which pt claims to have. Communicated that [facility] does not have any documentation indicating such. Kings also requests current medication list of pt, pt alleges that he is taking \"300 mg of fluoxetine\".    Kings states that he recently completed a comprehensive assessment, and states that Bipolar disorder may be considered in future assessments. Kings will fax current assessment to clinic.     Kings added to care teams.    RENETTA Long  8/8/2019    " Left voicemail for patient to schedule testing before upcoming appt on 8/11